# Patient Record
Sex: MALE | URBAN - NONMETROPOLITAN AREA
[De-identification: names, ages, dates, MRNs, and addresses within clinical notes are randomized per-mention and may not be internally consistent; named-entity substitution may affect disease eponyms.]

---

## 2024-06-17 ENCOUNTER — TELEPHONE (OUTPATIENT)
Dept: CARDIOLOGY | Facility: CLINIC | Age: 74
End: 2024-06-17
Payer: MEDICARE

## 2024-06-17 NOTE — TELEPHONE ENCOUNTER
Patient being referred to [facility] from [facility] for heart failure. Left message for patient to call office to schedule appointment.

## 2024-07-10 ENCOUNTER — APPOINTMENT (OUTPATIENT)
Dept: CARDIOLOGY | Facility: CLINIC | Age: 74
End: 2024-07-10
Payer: MEDICARE

## 2024-07-10 VITALS
HEART RATE: 56 BPM | WEIGHT: 124.8 LBS | HEIGHT: 69 IN | BODY MASS INDEX: 18.48 KG/M2 | DIASTOLIC BLOOD PRESSURE: 70 MMHG | SYSTOLIC BLOOD PRESSURE: 98 MMHG

## 2024-07-10 DIAGNOSIS — Z87.891 FORMER SMOKER: ICD-10-CM

## 2024-07-10 DIAGNOSIS — I25.2 HISTORY OF MI (MYOCARDIAL INFARCTION): ICD-10-CM

## 2024-07-10 DIAGNOSIS — I48.91 ATRIAL FIBRILLATION, UNSPECIFIED TYPE (MULTI): ICD-10-CM

## 2024-07-10 DIAGNOSIS — Z98.61 CORONARY ARTERIOSCLEROSIS AFTER PERCUTANEOUS TRANSLUMINAL CORONARY ANGIOPLASTY (PTCA): ICD-10-CM

## 2024-07-10 DIAGNOSIS — I25.10 CORONARY ARTERIOSCLEROSIS AFTER PERCUTANEOUS TRANSLUMINAL CORONARY ANGIOPLASTY (PTCA): ICD-10-CM

## 2024-07-10 DIAGNOSIS — R01.1 MURMUR, HEART: ICD-10-CM

## 2024-07-10 DIAGNOSIS — Z79.899 HIGH RISK MEDICATION USE: ICD-10-CM
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
## 2024-07-10 DIAGNOSIS — I25.10 ASHD (ARTERIOSCLEROTIC HEART DISEASE): ICD-10-CM

## 2024-07-10 DIAGNOSIS — I50.41 ACUTE COMBINED SYSTOLIC AND DIASTOLIC HEART FAILURE (MULTI): ICD-10-CM
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
## 2024-07-10 PROBLEM — E78.5 HYPERLIPIDEMIA: Status: ACTIVE | Noted: 2024-07-10

## 2024-07-10 PROCEDURE — 99205 OFFICE O/P NEW HI 60 MIN: CPT | Performed by: INTERNAL MEDICINE

## 2024-07-10 PROCEDURE — 1159F MED LIST DOCD IN RCRD: CPT | Performed by: INTERNAL MEDICINE

## 2024-07-10 PROCEDURE — 93000 ELECTROCARDIOGRAM COMPLETE: CPT | Performed by: INTERNAL MEDICINE

## 2024-07-10 PROCEDURE — 1036F TOBACCO NON-USER: CPT | Performed by: INTERNAL MEDICINE

## 2024-07-10 RX ORDER — FUROSEMIDE 40 MG/1
0.5 TABLET ORAL DAILY
COMMUNITY

## 2024-07-10 RX ORDER — SACUBITRIL AND VALSARTAN 24; 26 MG/1; MG/1
0.5 TABLET, FILM COATED ORAL 2 TIMES DAILY
COMMUNITY

## 2024-07-10 RX ORDER — LAMOTRIGINE 200 MG/1
200 TABLET ORAL EVERY MORNING
COMMUNITY

## 2024-07-10 RX ORDER — ARIPIPRAZOLE 5 MG/1
1.5 TABLET ORAL NIGHTLY
COMMUNITY

## 2024-07-10 RX ORDER — AMIODARONE HYDROCHLORIDE 200 MG/1
200 TABLET ORAL DAILY
COMMUNITY

## 2024-07-10 RX ORDER — ASPIRIN 81 MG/1
81 TABLET ORAL DAILY
COMMUNITY

## 2024-07-10 RX ORDER — LAMOTRIGINE 150 MG/1
150 TABLET ORAL NIGHTLY
COMMUNITY

## 2024-07-10 RX ORDER — ATORVASTATIN CALCIUM 80 MG/1
80 TABLET, FILM COATED ORAL DAILY
COMMUNITY

## 2024-07-10 ASSESSMENT — ENCOUNTER SYMPTOMS
LIGHT-HEADEDNESS: 1
DIZZINESS: 1

## 2024-07-10 NOTE — LETTER
July 10, 2024     Lewis Owens DO  [address]  [facility] [address]    Patient: Parish Valerio   YOB: 1950   Date of Visit: 7/10/2024       Dear Dr. Lewis Owens DO:    Thank you for referring Parish Valerio to me for evaluation. Below are my notes for this consultation.  If you have questions, please do not hesitate to call me. I look forward to following your patient along with you.       Sincerely,     Don Kuhn DO      CC: No Recipients  ______________________________________________________________________________________    Cardiology Consultation- New Consult    Reason for referral: CHF    HPI: Parish Valerio is a 74 y.o. male seen in cardiology consultation at the request of himself and having moved from [facility] to live with his significant other recently.  He was hospitalized after a fall at [facility]; and Entresto had been held, there is no cardiology consultation in May of this year.    In January of this year he had a severe heart failure event with new onset atrial fibrillation, was treated in [place] at [facility] underwent left and right heart catheterization, and cardiac MRI, and subsequent preparation for single-vessel bypass surgery and I believe possible mitral valve replacement (details of which are vague).  In any event, upon review of his epic chart, he did have left and right heart catheterization revealing chronic total occlusion of the proximal LAD, reconstituted by septal and diagonal branch collaterals distally, with a patent proximal diagonal branch stent with moderate 50% in-stent restenosis and mild, diffuse left circumflex and RCA disease only.  LVEDP was 9 mmHg, PA pressures were 40/19 mmHg, cardiac output/cardiac index measured at 2.52/1.47 L/min/m².  He underwent direct-current cardioversion on January 16 back to sinus rhythm on amiodarone and Eliquis therapies.    He did have a cardiac MRI  however there is no available report from January 15 in regards to the cardiac function there is no echo report available.  Pulmonary function tests were also reviewed revealing minimal restrictive physiology.    He has since been treated with amiodarone, apixaban, aspirin, atorvastatin, furosemide, Entresto.  His metoprolol has been discontinued by his treating cardiologist due to low blood pressures    Today's ECG reveals sinus bradycardia rate of 56, QT corrected interval 480 ms with evidence of high lateral Q wave infarction but no acute ischemic changes    Patient has a remote history of polio with chronic dropfoot, remote history of MI approximately 23 years ago with PCI involving the diagonal branch supposedly, bipolar disorder, and recently more frequent falls x 4 in the past 7 months    He is now seeking out continued cardiovascular evaluation, management and referral to CT surgery for the above LAD occlusion and possible mitral valve repair/replacement.    At this juncture he is quite frail, he has put on approximately 5 pounds in the last 4 weeks and is up to 114 pounds, he does have active bleeding from his right jaw/cheek bone from shaving recently.  He does have evidence of temporal wasting and what appears to be mild protein calorie malnutrition at least by observation    He does not complain of any angina, nor edema.  We do not have an idea about his left ventricular systolic function other than his cardiac index is low on right heart catheterization at 1.47 L/min/m² from this past January prior to institution of medications.    Recommendations: Obtain chemistry, BMP, echocardiography to further assess LV function on therapy and mitral valve status, recommend boost or Ensure supplementation, and will follow-up in 3 months; will also try to obtain the actual cardiac cath CD as well as a cardiac MRI reports and CD.      Past Medical History:   He has no past medical history on file.    Surgical  "History:   He has a past surgical history that includes Testicle surgery; Coronary angioplasty with stent; and Dental surgery.    Family History:   Family History   Problem Relation Name Age of Onset   • Stroke Mother     • Heart attack Father         Social History:   Social History     Tobacco Use   • Smoking status: Former     Current packs/day: 0.00     Types: Cigarettes     Quit date: 2024     Years since quittin.5   • Smokeless tobacco: Never   Substance Use Topics   • Alcohol use: Yes     Comment: socially        Allergies:  Farxiga [dapagliflozin]     Current Medications:    Current Outpatient Medications:   •  amiodarone (Pacerone) 200 mg tablet, Take 1 tablet (200 mg) by mouth once daily., Disp: , Rfl:   •  apixaban (Eliquis) 5 mg tablet, Take 1 tablet (5 mg) by mouth 2 times a day., Disp: , Rfl:   •  ARIPiprazole (Abilify) 5 mg tablet, Take 1.5 tablets (7.5 mg) by mouth once daily at bedtime., Disp: , Rfl:   •  aspirin 81 mg EC tablet, Take 1 tablet (81 mg) by mouth once daily., Disp: , Rfl:   •  atorvastatin (Lipitor) 80 mg tablet, Take 1 tablet (80 mg) by mouth once daily., Disp: , Rfl:   •  furosemide (Lasix) 40 mg tablet, Take 0.5 tablets (20 mg) by mouth once daily., Disp: , Rfl:   •  lamoTRIgine (LaMICtal) 150 mg tablet, Take 1 tablet (150 mg) by mouth once daily at bedtime., Disp: , Rfl:   •  lamoTRIgine (LaMICtal) 200 mg tablet, Take 1 tablet (200 mg) by mouth once daily in the morning., Disp: , Rfl:   •  sacubitriL-valsartan (Entresto) 24-26 mg tablet, Take 0.5 tablets by mouth 2 times a day., Disp: , Rfl:      Vitals:  Vitals:    07/10/24 1102 07/10/24 1106   BP: 102/72 98/70   BP Location: Left arm Right arm   Patient Position: Sitting Sitting   Pulse: 56    Weight: 56.6 kg (124 lb 12.8 oz)    Height: 1.753 m (5' 9\")       EKG done in office today     Review of Systems   Neurological:  Positive for dizziness and light-headedness.   All other systems reviewed and are " negative.      Objective        Physical Exam  Constitutional:       Appearance: Normal appearance.   HENT:      Nose: Nose normal.   Neck:      Vascular: No carotid bruit.   Cardiovascular:      Rate and Rhythm: Normal rate.      Pulses: Normal pulses.      Heart sounds: Murmur heard.      Comments: 1/6 systolic murmur  Pulmonary:      Effort: Pulmonary effort is normal.   Abdominal:      General: Bowel sounds are normal.      Palpations: Abdomen is soft.   Musculoskeletal:         General: Normal range of motion.      Cervical back: Normal range of motion.      Right lower leg: No edema.      Left lower leg: No edema.   Skin:     General: Skin is warm and dry.   Neurological:      General: No focal deficit present.      Mental Status: He is alert.   Psychiatric:         Mood and Affect: Mood normal.         Behavior: Behavior normal.         Thought Content: Thought content normal.         Judgment: Judgment normal.                Assessment and Plan:   1. ASHD (arteriosclerotic heart disease)        2. History of MI (myocardial infarction)        3. Coronary arteriosclerosis after percutaneous transluminal coronary angioplasty (PTCA)        4. Acute combined systolic and diastolic heart failure (Multi)        5. Atrial fibrillation, unspecified type (Multi)        6. High risk medication use        7. Fall, sequela        8. BMI less than 19,adult        9. Former smoker        10. Murmur, heart               Scribe Attestation  By signing my name below, IAurelia LPN, Scribe   attest that this documentation has been prepared under the direction and in the presence of Don Kuhn DO.    Provider Attestation - Scribe documentation    All medical record entries made by the Scribe were at my direction and personally dictated by me. I have reviewed the chart and agree that the record accurately reflects my personal performance of the history, physical exam, discussion and plan.

## 2024-07-10 NOTE — PROGRESS NOTES
Cardiology Consultation- New Consult    Reason for referral: CHF    HPI: Parish Valerio is a 74 y.o. male seen in cardiology consultation at the request of himself and having moved from [facility] to [place] to live with his significant other recently.  He was hospitalized after a fall at [facility]; and Entresto had been held, there is no cardiology consultation in May of this year.    In January of this year he had a severe heart failure event with new onset atrial fibrillation, was treated in [place] at [facility] underwent left and right heart catheterization, and cardiac MRI, and subsequent preparation for single-vessel bypass surgery and I believe possible mitral valve replacement (details of which are vague).  In any event, upon review of his epic chart, he did have left and right heart catheterization revealing chronic total occlusion of the proximal LAD, reconstituted by septal and diagonal branch collaterals distally, with a patent proximal diagonal branch stent with moderate 50% in-stent restenosis and mild, diffuse left circumflex and RCA disease only.  LVEDP was 9 mmHg, PA pressures were 40/19 mmHg, cardiac output/cardiac index measured at 2.52/1.47 L/min/m².  He underwent direct-current cardioversion on January 16 back to sinus rhythm on amiodarone and Eliquis therapies.    He did have a cardiac MRI however there is no available report from January 15 in regards to the cardiac function there is no echo report available.  Pulmonary function tests were also reviewed revealing minimal restrictive physiology.    He has since been treated with amiodarone, apixaban, aspirin, atorvastatin, furosemide, Entresto.  His metoprolol has been discontinued by his treating cardiologist due to low blood pressures    Today's ECG reveals sinus bradycardia rate of 56, QT corrected interval 480 ms with evidence of high lateral Q wave infarction but no acute ischemic changes    Patient has a  remote history of polio with chronic dropfoot, remote history of MI approximately 23 years ago with PCI involving the diagonal branch supposedly, bipolar disorder, and recently more frequent falls x 4 in the past 7 months    He is now seeking out continued cardiovascular evaluation, management and referral to CT surgery for the above LAD occlusion and possible mitral valve repair/replacement.    At this juncture he is quite frail, he has put on approximately 5 pounds in the last 4 weeks and is up to 114 pounds, he does have active bleeding from his right jaw/cheek bone from shaving recently.  He does have evidence of temporal wasting and what appears to be mild protein calorie malnutrition at least by observation    He does not complain of any angina, nor edema.  We do not have an idea about his left ventricular systolic function other than his cardiac index is low on right heart catheterization at 1.47 L/min/m² from this past January prior to institution of medications.    Recommendations: Obtain chemistry, BMP, echocardiography to further assess LV function on therapy and mitral valve status, recommend boost or Ensure supplementation, and will follow-up in 3 months; will also try to obtain the actual cardiac cath CD as well as a cardiac MRI reports and CD.      Past Medical History:   He has no past medical history on file.    Surgical History:   He has a past surgical history that includes Testicle surgery; Coronary angioplasty with stent; and Dental surgery.    Family History:   Family History   Problem Relation Name Age of Onset    Stroke Mother      Heart attack Father         Social History:   Social History     Tobacco Use    Smoking status: Former     Current packs/day: 0.00     Types: Cigarettes     Quit date: 2024     Years since quittin.5    Smokeless tobacco: Never   Substance Use Topics    Alcohol use: Yes     Comment: socially        Allergies:  Farxiga [dapagliflozin]     Current  "Medications:    Current Outpatient Medications:     amiodarone (Pacerone) 200 mg tablet, Take 1 tablet (200 mg) by mouth once daily., Disp: , Rfl:     apixaban (Eliquis) 5 mg tablet, Take 1 tablet (5 mg) by mouth 2 times a day., Disp: , Rfl:     ARIPiprazole (Abilify) 5 mg tablet, Take 1.5 tablets (7.5 mg) by mouth once daily at bedtime., Disp: , Rfl:     aspirin 81 mg EC tablet, Take 1 tablet (81 mg) by mouth once daily., Disp: , Rfl:     atorvastatin (Lipitor) 80 mg tablet, Take 1 tablet (80 mg) by mouth once daily., Disp: , Rfl:     furosemide (Lasix) 40 mg tablet, Take 0.5 tablets (20 mg) by mouth once daily., Disp: , Rfl:     lamoTRIgine (LaMICtal) 150 mg tablet, Take 1 tablet (150 mg) by mouth once daily at bedtime., Disp: , Rfl:     lamoTRIgine (LaMICtal) 200 mg tablet, Take 1 tablet (200 mg) by mouth once daily in the morning., Disp: , Rfl:     sacubitriL-valsartan (Entresto) 24-26 mg tablet, Take 0.5 tablets by mouth 2 times a day., Disp: , Rfl:      Vitals:  Vitals:    07/10/24 1102 07/10/24 1106   BP: 102/72 98/70   BP Location: Left arm Right arm   Patient Position: Sitting Sitting   Pulse: 56    Weight: 56.6 kg (124 lb 12.8 oz)    Height: 1.753 m (5' 9\")       EKG done in office today     Review of Systems   Neurological:  Positive for dizziness and light-headedness.   All other systems reviewed and are negative.      Objective         Physical Exam  Constitutional:       Appearance: Normal appearance.   HENT:      Nose: Nose normal.   Neck:      Vascular: No carotid bruit.   Cardiovascular:      Rate and Rhythm: Normal rate.      Pulses: Normal pulses.      Heart sounds: Murmur heard.      Comments: 1/6 systolic murmur  Pulmonary:      Effort: Pulmonary effort is normal.   Abdominal:      General: Bowel sounds are normal.      Palpations: Abdomen is soft.   Musculoskeletal:         General: Normal range of motion.      Cervical back: Normal range of motion.      Right lower leg: No edema.      Left " lower leg: No edema.   Skin:     General: Skin is warm and dry.   Neurological:      General: No focal deficit present.      Mental Status: He is alert.   Psychiatric:         Mood and Affect: Mood normal.         Behavior: Behavior normal.         Thought Content: Thought content normal.         Judgment: Judgment normal.                Assessment and Plan:   1. ASHD (arteriosclerotic heart disease)        2. History of MI (myocardial infarction)        3. Coronary arteriosclerosis after percutaneous transluminal coronary angioplasty (PTCA)        4. Acute combined systolic and diastolic heart failure (Multi)        5. Atrial fibrillation, unspecified type (Multi)        6. High risk medication use        7. Fall, sequela        8. BMI less than 19,adult        9. Former smoker        10. Murmur, heart               Scribe Attestation  By signing my name below, I, Aurelia ORTIZ LPN  , Scribe   attest that this documentation has been prepared under the direction and in the presence of Don Kuhn DO.    Provider Attestation - Scribe documentation    All medical record entries made by the Scribe were at my direction and personally dictated by me. I have reviewed the chart and agree that the record accurately reflects my personal performance of the history, physical exam, discussion and plan.

## 2024-07-10 NOTE — PATIENT INSTRUCTIONS
Please bring all medicines, vitamins, and herbal supplements with you when you come to the office.    Prescriptions will not be filled unless you are compliant with your follow up appointments or have a follow up appointment scheduled as per instruction of your physician. Refills should be requested at the time of your visit.     Fall Prevention Education Given   BMI was below normal measurement. Current weight: 56.6 kg (124 lb 12.8 oz)  Weight change since last visit (-) denotes wt loss 124.8 lbs   Weight gain needed to achieve  BMI of 18.5 = 0.2 Lbs    Instructed patient to consume high calorie, high protein diet.

## 2024-07-16 ENCOUNTER — APPOINTMENT (OUTPATIENT)
Dept: CARDIOLOGY | Facility: CLINIC | Age: 74
End: 2024-07-16
Payer: MEDICARE

## 2024-07-17 ENCOUNTER — APPOINTMENT (OUTPATIENT)
Dept: CARDIOLOGY | Facility: CLINIC | Age: 74
End: 2024-07-17
Payer: MEDICARE

## 2024-07-23 ENCOUNTER — APPOINTMENT (OUTPATIENT)
Dept: CARDIOLOGY | Facility: CLINIC | Age: 74
End: 2024-07-23
Payer: MEDICARE

## 2024-07-23 DIAGNOSIS — I48.91 ATRIAL FIBRILLATION, UNSPECIFIED TYPE (MULTI): ICD-10-CM

## 2024-07-23 PROCEDURE — 93272 ECG/REVIEW INTERPRET ONLY: CPT | Performed by: INTERNAL MEDICINE

## 2024-07-24 ENCOUNTER — TELEPHONE (OUTPATIENT)
Dept: CARDIOLOGY | Facility: CLINIC | Age: 74
End: 2024-07-24
Payer: MEDICARE

## 2024-07-24 DIAGNOSIS — E78.5 HYPERLIPIDEMIA, UNSPECIFIED HYPERLIPIDEMIA TYPE: ICD-10-CM

## 2024-07-24 DIAGNOSIS — I25.2 HISTORY OF MI (MYOCARDIAL INFARCTION): ICD-10-CM

## 2024-07-24 DIAGNOSIS — I25.10 ASHD (ARTERIOSCLEROTIC HEART DISEASE): ICD-10-CM

## 2024-07-24 DIAGNOSIS — I25.10 CORONARY ARTERIOSCLEROSIS AFTER PERCUTANEOUS TRANSLUMINAL CORONARY ANGIOPLASTY (PTCA): ICD-10-CM

## 2024-07-24 DIAGNOSIS — Z98.61 CORONARY ARTERIOSCLEROSIS AFTER PERCUTANEOUS TRANSLUMINAL CORONARY ANGIOPLASTY (PTCA): ICD-10-CM

## 2024-07-24 NOTE — TELEPHONE ENCOUNTER
"Wife presents to office very concerned about patient's status. She states he has been in bed since 7/14/24 where he only gets up for about an hour a day. She says he is going down hill very quickly. He reports occasional episodes of lightheadedness, dizziness, and just recently complained of chest pain today which subsided. He is taking all meds as prescribed. Wife is very concerned as she feels, \"he is wasting away in front of me.\" Instructed her to take patient to ER. She is declining at this time as she states patient is refusing to go. Please advise  "

## 2024-07-25 LAB
NON-UH HIE ANION GAP: 9.4 (ref 6–15)
NON-UH HIE B-TYPE NATRIURETIC PEPTIDE: 58 PG/ML (ref 5–100)
NON-UH HIE BLOOD UREA NITROGEN: 26 MG/DL (ref 7–25)
NON-UH HIE CALCIUM: 9.6 MG/DL (ref 8.6–10.3)
NON-UH HIE CARBON DIOXIDE: 30.7 MMOL/L (ref 21–31)
NON-UH HIE CHLORIDE: 102 MMOL/L (ref 98–107)
NON-UH HIE CREATININE: 0.87 MG/DL (ref 0.7–1.3)
NON-UH HIE ESTIMATED GFR: > 60
NON-UH HIE GLUCOSE: 114 MG/DL (ref 70–100)
NON-UH HIE POTASSIUM: 4.1 MMOL/L (ref 3.5–5.1)
NON-UH HIE SODIUM: 138 MMOL/L (ref 136–145)

## 2024-07-26 ENCOUNTER — TELEPHONE (OUTPATIENT)
Dept: CARDIOLOGY | Facility: CLINIC | Age: 74
End: 2024-07-26
Payer: MEDICARE

## 2024-07-26 NOTE — TELEPHONE ENCOUNTER
----- Message from Don Kuhn sent at 7/25/2024 12:40 PM EDT -----  Regarding: Referral  Patient originally referred to me from [facility] in [place] following cardiac workup for heart failure including left and right heart catheterizations and MRI of the heart    He has chronic occlusion of the mid LAD with persistent transmural infarction in the mid anterior segment likely secondary to diagonal branch and viability of the mid to distal LAD distribution and ejection fraction of 32%.    He should be referred to cardiothoracic surgery and heart failure team at [facility] for consideration of single-vessel revascularization and possible mitral valve repair versus continued medical therapy for ischemic cardiomyopathy.  I would recommend Dr. Gera Garcia for cardiac surgery and Dr. Thornton for heart failure team.  ----- Message -----  From: Christy Swartz  Sent: 7/11/2024   3:32 PM EDT  To: Don Kuhn, DO

## 2024-08-02 NOTE — TELEPHONE ENCOUNTER
Wife phones back, updated her on referral for CT surgical consultation, she verbalized understanding and is agreeable.     Awaiting to receive results from [facility].    Order prepped and waiting signature from Dr. Don Kuhn, DO    Task sent to  to call and arrange once order signed.

## 2024-08-09 ENCOUNTER — OFFICE VISIT (OUTPATIENT)
Dept: CARDIOLOGY | Facility: HOSPITAL | Age: 74
End: 2024-08-09
Payer: MEDICARE

## 2024-08-09 ENCOUNTER — HOSPITAL ENCOUNTER (OUTPATIENT)
Dept: CARDIOLOGY | Facility: HOSPITAL | Age: 74
Discharge: HOME | End: 2024-08-09
Payer: MEDICARE

## 2024-08-09 VITALS
WEIGHT: 124 LBS | HEIGHT: 69 IN | SYSTOLIC BLOOD PRESSURE: 84 MMHG | OXYGEN SATURATION: 97 % | BODY MASS INDEX: 18.37 KG/M2 | DIASTOLIC BLOOD PRESSURE: 48 MMHG | HEART RATE: 62 BPM

## 2024-08-09 DIAGNOSIS — I50.20 ACC/AHA STAGE C SYSTOLIC HEART FAILURE (MULTI): Primary | ICD-10-CM

## 2024-08-09 DIAGNOSIS — I48.0 PAROXYSMAL ATRIAL FIBRILLATION (MULTI): ICD-10-CM

## 2024-08-09 DIAGNOSIS — I25.10 ASCVD (ARTERIOSCLEROTIC CARDIOVASCULAR DISEASE): ICD-10-CM

## 2024-08-09 DIAGNOSIS — I25.5 ISCHEMIC CARDIOMYOPATHY: ICD-10-CM

## 2024-08-09 LAB
BODY SURFACE AREA: 1.65 M2
BODY SURFACE AREA: 1.65 M2

## 2024-08-09 PROCEDURE — 3008F BODY MASS INDEX DOCD: CPT | Performed by: INTERNAL MEDICINE

## 2024-08-09 PROCEDURE — 99215 OFFICE O/P EST HI 40 MIN: CPT | Performed by: INTERNAL MEDICINE

## 2024-08-09 PROCEDURE — 1159F MED LIST DOCD IN RCRD: CPT | Performed by: INTERNAL MEDICINE

## 2024-08-09 PROCEDURE — 1036F TOBACCO NON-USER: CPT | Performed by: INTERNAL MEDICINE

## 2024-08-09 PROCEDURE — 99205 OFFICE O/P NEW HI 60 MIN: CPT | Performed by: INTERNAL MEDICINE

## 2024-08-09 PROCEDURE — 1126F AMNT PAIN NOTED NONE PRSNT: CPT | Performed by: INTERNAL MEDICINE

## 2024-08-09 ASSESSMENT — PAIN SCALES - GENERAL: PAINLEVEL: 0-NO PAIN

## 2024-08-09 NOTE — PATIENT INSTRUCTIONS
It was a pleasure seeing you today. Please contact myself or my team with any questions.     To reach Dr. Thornton' office please call [phone] (Pam).   Fax: [phone]   To schedule an appointment call [phone]     If you have any questions or need cardiac medication refills, please call the Heart Failure office at [phone], option 6. You may also contact the  Heart Failure Nursing team via email at [email or web address] (Please include your name and date of birth).        1) Continue your current medications  2) Please work to find any supplements that you can take to gain weight; you need to demonstrate a trend upwards  3) Follow up in 6 weeks at /[facility]

## 2024-08-09 NOTE — LETTER
"August 9, 2024     Don Kuhn DO  [address]  [facility] [address]    Patient: Parish Valerio   YOB: 1950   Date of Visit: 8/9/2024       Dear Dr. Don Kuhn DO:    Thank you for referring Parish Valerio to me for evaluation. Below are my notes for this consultation.  If you have questions, please do not hesitate to call me. I look forward to following your patient along with you.       Sincerely,     Froilan Thornton DO      CC: No Recipients  ______________________________________________________________________________________        [facility]  Primary Care Physician: Lewis Owens DO  Referring Provider/Cardiologist: Bam     Date of Visit: 08/09/2024 11:00 AM EDT  Location of visit: [facility]     HPI:   Mr. Valerio is a 74M with a PMHx sig for CAD s/p PCI (diag; 2001), stage C systolic HF/ICM/HFrEF with moderate LV dysfunction currently without an ICD, and pAF on AAD and DOAC therapies who was referred to the Advanced Heart Failure clinic for consultation.     Initially diagnosed with ICM/HFrEF when he presented to [facility] ([facility]) in early January with progressive dyspnea and chest discomfort. At that time he was noted to have LV dysfunction. A cath noted pLAD occlusion. A cMRI was performed noting mostly of the anterior had viability. After consultation with CTS his CABG was deferred in light of weight loss/sarcopenia. He was told to \"bulk up\" and then he would be reconsidered.     Since that time he has relocated to [place] to live with his GF. When he presents today he reports that he is still struggling to gain much weight (has gained ~4 lbs over the last 7 months). They just recently picked up protein shakes and are planning on giving them a try.     At the current time he denies chest pain, PND, orthopnea, LE edema, palpitations, light headedness, dizziness, or syncope. He " "does experience exertional dyspnea with activities (taking out the trash etc). He had previously attempted physical therapy but due to ongoing issues related to balance further therapy was deferred. He had not participated in cardiac rehab for this reason as well.         PMHx:  Remote history of polio (age 18 months), CAD s/p PCI (diag), stage C systolic HF/ICM/HFrEF with moderate LV dysfunction currently without an ICD, pAF on AAD and DOAC therapies    SocHx:  Lives in [place] with GF  Quit smoking (1/2024); 0.5-0.75 ppd for his adult life  Occ etOH, denies illicits    FamHx:  Father with CAD/MI  Mother with CVA        Current Outpatient Medications   Medication Sig Dispense Refill   • amiodarone (Pacerone) 200 mg tablet Take 1 tablet (200 mg) by mouth once daily.     • apixaban (Eliquis) 5 mg tablet Take 1 tablet (5 mg) by mouth 2 times a day.     • ARIPiprazole (Abilify) 5 mg tablet Take 1.5 tablets (7.5 mg) by mouth once daily at bedtime.     • aspirin 81 mg EC tablet Take 1 tablet (81 mg) by mouth once daily.     • atorvastatin (Lipitor) 80 mg tablet Take 1 tablet (80 mg) by mouth once daily.     • furosemide (Lasix) 40 mg tablet Take 0.5 tablets (20 mg) by mouth once daily.     • lamoTRIgine (LaMICtal) 150 mg tablet Take 1 tablet (150 mg) by mouth once daily at bedtime.     • lamoTRIgine (LaMICtal) 200 mg tablet Take 1 tablet (200 mg) by mouth once daily in the morning.     • sacubitriL-valsartan (Entresto) 24-26 mg tablet Take 0.5 tablets by mouth 2 times a day.       No current facility-administered medications for this visit.       Allergies   Allergen Reactions   • Farxiga [Dapagliflozin] Nausea/vomiting         Visit Vitals  BP (!) 84/48 (BP Location: Left arm, Patient Position: Sitting)   Pulse 62   Ht 1.753 m (5' 9\")   Wt 56.2 kg (124 lb)   SpO2 97%   BMI 18.31 kg/m²   Smoking Status Former   BSA 1.65 m²        Physical Exam:  On exam Mr. Valerio appears sarcopenic, is alert and oriented x3, and in " no acute distress. His sclera are anicteric and his oropharynx has moist mucous membranes. His neck is supple and without thyromegaly. The JVP is ~5 cm of water above the right atrium. There is a faint bruit on the left. His cardiac exam has regular rhythm, normal S1, S2. No S3/4. There are no murmurs. His lungs are clear to auscultation bilaterally and there is no dullness to percussion. His abdomen is soft, nontender with normoactive bowel sounds. There is no HJR. The extremities are warm and without edema. The skin is dry. There is no rash present. The distal pulses are 2+ in all four extremities. His mood and affect are appropriate for todays encounter.       Cardiac Labs/Diagnostics:  Holter (7/23/24):  1-the rhythm was sinus throughout the recording with an average heart rate of 58 bpm, the minimum heart rate was 47 bpm sinus bradycardia at 12:47 AM and the maximal heart rate was 103 bpm sinus tachycardia at 9:16 AM  2-occasional isolated premature ventricular complexes, there was a total number of 313 beats in 24 hours representing 0.4% of total QRS complexes with 1 event of ventricular couplet  3-rare isolated premature atrial complexes, there was 10 beats in 24 hours  4-no pauses exceeding 2 seconds were seen  5- an episode of chest pain reported by the patient correlated with normal sinus rhythm and normal heart rate    cMRI (1/15/24):  1. Mildly dilated left ventricle with moderately reduced systolic function. LVEF = 32%. There is severe   hypokinesis of the mid to basal anterior wall with mild to moderate global hypokinesis. Delayed Gadolinium   enhancement imaging demonstrates non-transmural scar (26-50% wall thickness) in the mid anterior wall .   There is transmural scar (50-75% wall thickness) in the mid anterolateral wall that is non-viable. All   remaining myocardial segments are viable. Overall, findings suggest that majority of the LAD territory is   viable. The segment of the mid anterolateral  wall that is non-viable is likely diagonal branch territory.   2. Normal right ventricular size and systolic function. RVEF = 56%.   3. Moderate mitral regurgitation (regurgitant volume 33 ml, regurgitant fraction 46%).   4. Mild to moderate aortic regurgitation.   5. Mildly dilated aortic root (4.1 cm) and ascending aorta (4.2 cm).   6. Extra-cardiac: Non-cardiac findings visualized on this study will be independently reviewed by a   radiologist. Please see the separate Radiologist report.     Cardiac cath (1/12/24):  LM: Mild disease.   LAD: The LAD is occluded in the proximal segment following a trifurcation with a large septal and large diagonal branch.  The diagonal branch has a patent proximal vessel previously placed stent with up to 50% ISR.  The large septal at this trifurcation also has moderate diffuse disease.  Both the diagonal and septal feed collateralization to the distal LAD.   LCX: Mild disease and up to 40% mid vessel lesion.   RCA: Moderate-sized vessel, mild diffuse disease.  Dominant.   LVEDP: 9 mmHg   Access: Right radial artery, TR compression band.     Carotid ultrasounds (1/12/24):  Right.   * Less than 50% stenosis in the right internal carotid artery.   * Right vertebral artery is patent with antegrade flow.   * No evidence of hemodynamically significant stenosis in the right subclavian, external carotid and common carotid arteries.   * The right vertebral artery flow is dampened.   Left.   * Less than 50% stenosis in the left internal carotid artery.   * Left vertebral artery is patent with antegrade flow.   * No evidence of hemodynamically significant stenosis in the left common carotid, external carotid and subclavian arteries.     Right Heart Catheterization   RA:8 mean mmHg   RV:36/2 mmHg   PA: 40/19 (27) mmHg   PCWP: 17/20 (20) mmHg     Thermal CO/CI: 2.52 L/m / 1.47 L/min/m2   Meryl CO/CI: 3.46 L/m / 2.02 L/min/m2       Impression/Plan:  Mr. Valerio is a 74M with a PMHx sig for CAD  s/p PCI (diag; 2001), stage C systolic HF/ICM/HFrEF with moderate LV dysfunction currently without an ICD, and pAF on AAD and DOAC therapies who was referred to the Advanced Heart Failure clinic for consultation. At the current time he has functional class II symptoms and is euvolemic on exam. He does appear frail and sarcopenic with protein calorie malnutrition.     1) Stage C chronic systolic HF/ICM/HFrEF with moderate LV dysfunction (LVEF 32%; 1/2024) currently without an ICD  Currently on maximally tolerated GDMT (limited by BP). RHC in January with reduced CO/CI with minimally elevated left sided filling pressure. Of note, not on a GDM BB given his reduced CO/CI. No signs of volume overload.   -c/w entresto 12/13 mg bid, furosemide 20 mg daily    2) CAD s/p PCI (diag; 2001)  Cath in 1/2024 with pLAD occlusion. cMRI with viability in most the anterior wall. CABG deferred at that time in light of ongoing sarcopenia and protein calorie malnutrition. Since then he has only gained ~4 lbs. We discussed way to improve his nutritional status to allow him to become a possible surgical candidate. In addition, we discussed that if he has isolated LAD disease, he could be considered for MIDCAB (vs sternotomy). While in clinic, I briefly had Dr. Holman come and see him who was in agreement.   -c/w ASA, statin  -will obtain cath and cMRI images to review  -will focus on increasing his nutritional intake (discussed ensure/boost and magic cups)  -will have him return in 6 weeks; pending the degree of sarcopenia that persists will discuss with either Daniela Carrasco or Suri Gee re: MIDCAB    3) pAF on AAD and DOAC therapies  Previously required cardioversion. Denies bleeding.   -c/w amiodarone 200 mg daily, eliquis 5 mg bid        F/U: 6 weeks at /[facility]    [place]on,  Thank you for referring Mr. Valerio to the  Advanced Heart Failure Clinic. Please let me know if you have any questions.        ____________________________________________________________  Froilan Thornton DO  Section of Advanced Heart Failure and Cardiac Transplantation  Division of Cardiovascular Medicine  [facility] [place]  [facility]

## 2024-08-09 NOTE — PROGRESS NOTES
"    [facility]  Primary Care Physician: Lewis Owens DO  Referring Provider/Cardiologist: Bam     Date of Visit: 08/09/2024 11:00 AM EDT  Location of visit: [facility]     HPI:   Mr. Valerio is a 74M with a PMHx sig for CAD s/p PCI (diag; 2001), stage C systolic HF/ICM/HFrEF with moderate LV dysfunction currently without an ICD, and pAF on AAD and DOAC therapies who was referred to the Advanced Heart Failure clinic for consultation.     Initially diagnosed with ICM/HFrEF when he presented to [facility] ([facility]) in early January with progressive dyspnea and chest discomfort. At that time he was noted to have LV dysfunction. A cath noted pLAD occlusion. A cMRI was performed noting mostly of the anterior had viability. After consultation with CTS his CABG was deferred in light of weight loss/sarcopenia. He was told to \"bulk up\" and then he would be reconsidered.     Since that time he has relocated to [place] to live with his GF. When he presents today he reports that he is still struggling to gain much weight (has gained ~4 lbs over the last 7 months). They just recently picked up protein shakes and are planning on giving them a try.     At the current time he denies chest pain, PND, orthopnea, LE edema, palpitations, light headedness, dizziness, or syncope. He does experience exertional dyspnea with activities (taking out the trash etc). He had previously attempted physical therapy but due to ongoing issues related to balance further therapy was deferred. He had not participated in cardiac rehab for this reason as well.         PMHx:  Remote history of polio (age 18 months), CAD s/p PCI (diag), stage C systolic HF/ICM/HFrEF with moderate LV dysfunction currently without an ICD, pAF on AAD and DOAC therapies    SocHx:  Lives in [place] with GF  Quit smoking (1/2024); 0.5-0.75 ppd for his adult life  Occ etOH, denies illicits    FamHx:  Father " "with CAD/MI  Mother with CVA        Current Outpatient Medications   Medication Sig Dispense Refill    amiodarone (Pacerone) 200 mg tablet Take 1 tablet (200 mg) by mouth once daily.      apixaban (Eliquis) 5 mg tablet Take 1 tablet (5 mg) by mouth 2 times a day.      ARIPiprazole (Abilify) 5 mg tablet Take 1.5 tablets (7.5 mg) by mouth once daily at bedtime.      aspirin 81 mg EC tablet Take 1 tablet (81 mg) by mouth once daily.      atorvastatin (Lipitor) 80 mg tablet Take 1 tablet (80 mg) by mouth once daily.      furosemide (Lasix) 40 mg tablet Take 0.5 tablets (20 mg) by mouth once daily.      lamoTRIgine (LaMICtal) 150 mg tablet Take 1 tablet (150 mg) by mouth once daily at bedtime.      lamoTRIgine (LaMICtal) 200 mg tablet Take 1 tablet (200 mg) by mouth once daily in the morning.      sacubitriL-valsartan (Entresto) 24-26 mg tablet Take 0.5 tablets by mouth 2 times a day.       No current facility-administered medications for this visit.       Allergies   Allergen Reactions    Farxiga [Dapagliflozin] Nausea/vomiting         Visit Vitals  BP (!) 84/48 (BP Location: Left arm, Patient Position: Sitting)   Pulse 62   Ht 1.753 m (5' 9\")   Wt 56.2 kg (124 lb)   SpO2 97%   BMI 18.31 kg/m²   Smoking Status Former   BSA 1.65 m²        Physical Exam:  On exam Mr. Valerio appears sarcopenic, is alert and oriented x3, and in no acute distress. His sclera are anicteric and his oropharynx has moist mucous membranes. His neck is supple and without thyromegaly. The JVP is ~5 cm of water above the right atrium. There is a faint bruit on the left. His cardiac exam has regular rhythm, normal S1, S2. No S3/4. There are no murmurs. His lungs are clear to auscultation bilaterally and there is no dullness to percussion. His abdomen is soft, nontender with normoactive bowel sounds. There is no HJR. The extremities are warm and without edema. The skin is dry. There is no rash present. The distal pulses are 2+ in all four " extremities. His mood and affect are appropriate for todays encounter.       Cardiac Labs/Diagnostics:  Holter (7/23/24):  1-the rhythm was sinus throughout the recording with an average heart rate of 58 bpm, the minimum heart rate was 47 bpm sinus bradycardia at 12:47 AM and the maximal heart rate was 103 bpm sinus tachycardia at 9:16 AM  2-occasional isolated premature ventricular complexes, there was a total number of 313 beats in 24 hours representing 0.4% of total QRS complexes with 1 event of ventricular couplet  3-rare isolated premature atrial complexes, there was 10 beats in 24 hours  4-no pauses exceeding 2 seconds were seen  5- an episode of chest pain reported by the patient correlated with normal sinus rhythm and normal heart rate    cMRI (1/15/24):  1. Mildly dilated left ventricle with moderately reduced systolic function. LVEF = 32%. There is severe   hypokinesis of the mid to basal anterior wall with mild to moderate global hypokinesis. Delayed Gadolinium   enhancement imaging demonstrates non-transmural scar (26-50% wall thickness) in the mid anterior wall .   There is transmural scar (50-75% wall thickness) in the mid anterolateral wall that is non-viable. All   remaining myocardial segments are viable. Overall, findings suggest that majority of the LAD territory is   viable. The segment of the mid anterolateral wall that is non-viable is likely diagonal branch territory.   2. Normal right ventricular size and systolic function. RVEF = 56%.   3. Moderate mitral regurgitation (regurgitant volume 33 ml, regurgitant fraction 46%).   4. Mild to moderate aortic regurgitation.   5. Mildly dilated aortic root (4.1 cm) and ascending aorta (4.2 cm).   6. Extra-cardiac: Non-cardiac findings visualized on this study will be independently reviewed by a   radiologist. Please see the separate Radiologist report.     Cardiac cath (1/12/24):  LM: Mild disease.   LAD: The LAD is occluded in the proximal segment  following a trifurcation with a large septal and large diagonal branch.  The diagonal branch has a patent proximal vessel previously placed stent with up to 50% ISR.  The large septal at this trifurcation also has moderate diffuse disease.  Both the diagonal and septal feed collateralization to the distal LAD.   LCX: Mild disease and up to 40% mid vessel lesion.   RCA: Moderate-sized vessel, mild diffuse disease.  Dominant.   LVEDP: 9 mmHg   Access: Right radial artery, TR compression band.     Carotid ultrasounds (1/12/24):  Right.   * Less than 50% stenosis in the right internal carotid artery.   * Right vertebral artery is patent with antegrade flow.   * No evidence of hemodynamically significant stenosis in the right subclavian, external carotid and common carotid arteries.   * The right vertebral artery flow is dampened.   Left.   * Less than 50% stenosis in the left internal carotid artery.   * Left vertebral artery is patent with antegrade flow.   * No evidence of hemodynamically significant stenosis in the left common carotid, external carotid and subclavian arteries.     Right Heart Catheterization   RA:8 mean mmHg   RV:36/2 mmHg   PA: 40/19 (27) mmHg   PCWP: 17/20 (20) mmHg     Thermal CO/CI: 2.52 L/m / 1.47 L/min/m2   Meryl CO/CI: 3.46 L/m / 2.02 L/min/m2       Impression/Plan:  Mr. Valerio is a 74M with a PMHx sig for CAD s/p PCI (diag; 2001), stage C systolic HF/ICM/HFrEF with moderate LV dysfunction currently without an ICD, and pAF on AAD and DOAC therapies who was referred to the Advanced Heart Failure clinic for consultation. At the current time he has functional class II symptoms and is euvolemic on exam. He does appear frail and sarcopenic with protein calorie malnutrition.     1) Stage C chronic systolic HF/ICM/HFrEF with moderate LV dysfunction (LVEF 32%; 1/2024) currently without an ICD  Currently on maximally tolerated GDMT (limited by BP). RHC in January with reduced CO/CI with minimally  elevated left sided filling pressure. Of note, not on a GDM BB given his reduced CO/CI. No signs of volume overload.   -c/w entresto 12/13 mg bid, furosemide 20 mg daily    2) CAD s/p PCI (diag; 2001)  Cath in 1/2024 with pLAD occlusion. cMRI with viability in most the anterior wall. CABG deferred at that time in light of ongoing sarcopenia and protein calorie malnutrition. Since then he has only gained ~4 lbs. We discussed way to improve his nutritional status to allow him to become a possible surgical candidate. In addition, we discussed that if he has isolated LAD disease, he could be considered for MIDCAB (vs sternotomy). While in clinic, I briefly had Dr. Holman come and see him who was in agreement.   -c/w ASA, statin  -will obtain cath and cMRI images to review  -will focus on increasing his nutritional intake (discussed ensure/boost and magic cups)  -will have him return in 6 weeks; pending the degree of sarcopenia that persists will discuss with either Daniela Carrasco or Suri Gee re: MIDCAB    3) pAF on AAD and DOAC therapies  Previously required cardioversion. Denies bleeding.   -c/w amiodarone 200 mg daily, eliquis 5 mg bid        F/U: 6 weeks at /[facility]    [place],  Thank you for referring Mr. Valerio to the  Advanced Heart Failure Clinic. Please let me know if you have any questions.       ____________________________________________________________  Froilan Thornton DO  Section of Advanced Heart Failure and Cardiac Transplantation  Division of Cardiovascular Medicine  [facility] [place]  [facility]

## 2024-08-13 ENCOUNTER — APPOINTMENT (OUTPATIENT)
Dept: CARDIOLOGY | Facility: CLINIC | Age: 74
End: 2024-08-13
Payer: MEDICARE

## 2024-08-14 ENCOUNTER — TELEPHONE (OUTPATIENT)
Dept: CARDIOLOGY | Facility: CLINIC | Age: 74
End: 2024-08-14
Payer: MEDICARE

## 2024-08-14 NOTE — TELEPHONE ENCOUNTER
----- Message from Don Kuhn sent at 8/14/2024 12:37 PM EDT -----  Regarding: Holter monitor  Please send Holter monitor results to patient:    Unremarkable 24-hour Holter monitor, it demonstrated normal sinus rhythm mechanism with an average heart rate of 58 bpm.  There was 313 isolated PVCs with 10 PACs with no complex arrhythmias.  An episode of chest pain reported by the patient correlated with normal sinus rhythm and normal heart rate  ----- Message -----  From: Fátima Mcgill MD  Sent: 8/4/2024   2:03 PM EDT  To: Don Kuhn, DO

## 2024-08-14 NOTE — TELEPHONE ENCOUNTER
Result Communication    Resulted Orders   Holter Or Event Cardiac Monitor    Narrative    1-the rhythm was sinus throughout the recording with an average heart rate   of 58 bpm, the minimum heart rate was 47 bpm sinus bradycardia at 12:47 AM   and the maximal heart rate was 103 bpm sinus tachycardia at 9:16 AM  2-occasional isolated premature ventricular complexes, there was a total   number of 313 beats in 24 hours representing 0.4% of total QRS complexes   with 1 event of ventricular couplet  3-rare isolated premature atrial complexes, there was 10 beats in 24 hours  4-no pauses exceeding 2 seconds were seen  5- an episode of chest pain reported by the patient correlated with normal   sinus rhythm and normal heart rate    Conclusion:    Unremarkable 24-hour Holter monitor, it demonstrated normal sinus rhythm   mechanism with an average heart rate of 58 bpm.  There was 313 isolated   PVCs with 10 PACs with no complex arrhythmias.  An episode of chest pain   reported by the patient correlated with normal sinus rhythm and normal   heart rate         1:07 PM      Results were not successfully communicated with the patient and they did not acknowledge their understanding.

## 2024-09-24 ENCOUNTER — APPOINTMENT (OUTPATIENT)
Dept: CARDIOLOGY | Facility: CLINIC | Age: 74
End: 2024-09-24
Payer: MEDICARE

## 2024-09-24 VITALS
HEART RATE: 82 BPM | DIASTOLIC BLOOD PRESSURE: 72 MMHG | WEIGHT: 127 LBS | OXYGEN SATURATION: 98 % | HEIGHT: 69 IN | BODY MASS INDEX: 18.81 KG/M2 | SYSTOLIC BLOOD PRESSURE: 124 MMHG

## 2024-09-24 DIAGNOSIS — I25.10 ASCVD (ARTERIOSCLEROTIC CARDIOVASCULAR DISEASE): ICD-10-CM

## 2024-09-24 DIAGNOSIS — I25.5 ISCHEMIC CARDIOMYOPATHY: ICD-10-CM

## 2024-09-24 DIAGNOSIS — I50.20 ACC/AHA STAGE C SYSTOLIC HEART FAILURE: Primary | ICD-10-CM

## 2024-09-24 DIAGNOSIS — M62.84 SARCOPENIA: ICD-10-CM

## 2024-09-24 PROCEDURE — 1159F MED LIST DOCD IN RCRD: CPT | Performed by: INTERNAL MEDICINE

## 2024-09-24 PROCEDURE — 3008F BODY MASS INDEX DOCD: CPT | Performed by: INTERNAL MEDICINE

## 2024-09-24 PROCEDURE — 1160F RVW MEDS BY RX/DR IN RCRD: CPT | Performed by: INTERNAL MEDICINE

## 2024-09-24 PROCEDURE — 99214 OFFICE O/P EST MOD 30 MIN: CPT | Performed by: INTERNAL MEDICINE

## 2024-09-24 PROCEDURE — 1036F TOBACCO NON-USER: CPT | Performed by: INTERNAL MEDICINE

## 2024-09-24 NOTE — PROGRESS NOTES
"    [facility]  Primary Care Physician: Lewis Owens DO  Referring Provider/Cardiologist: Bam     Date of Visit: 09/24/2024  3:00 PM EDT  Location of visit: [address]     HPI:   Mr. Valerio is a 74M with a PMHx sig for CAD s/p PCI (diag; 2001), stage C systolic HF/ICM/HFrEF with moderate LV dysfunction currently without an ICD, pAF on AAD and DOAC therapies, and remote h/o polio who returns to the Advanced Heart Failure clinic for ongoing evaluation and management.     Initially diagnosed with ICM/HFrEF when he presented to [facility] in January '24 with progressive dyspnea and chest discomfort. At that time he was noted to have LV dysfunction. A cath noted pLAD occlusion/. A cMRI was performed noting that most of the anterior had viability. After consultation with CTS his CABG was deferred in light of weight loss/sarcopenia. He was told to \"bulk up\" and then he would be reconsidered. He has since relocated to [facility] and was referred to /[facility] for evaluation.     Interval hx:   Since our initial encounter he has been taking in 3-4 boosts per day with a weight gain of ~4 lbs. He is more active and feels better.     He currently denies chest pain, palpitations, shortness of breath, dyspnea on exertion, orthopnea, PND. No edema noted in BLE. He denies headaches, dizziness or recent falls.       PMHx:  Remote history of polio (age 18 months), CAD s/p PCI (diag), stage C systolic HF/ICM/HFrEF with moderate LV dysfunction currently without an ICD, pAF on AAD and DOAC therapies    SocHx:  Lives in [place] with GF  Quit smoking (1/2024); 0.5-0.75 ppd for his adult life  Occ etOH, denies illicits    FamHx:  Father with CAD/MI  Mother with CVA        Current Outpatient Medications   Medication Sig Dispense Refill    amiodarone (Pacerone) 200 mg tablet Take 1 tablet (200 mg) by mouth once daily.      apixaban (Eliquis) 5 mg tablet " "Take 1 tablet (5 mg) by mouth 2 times a day.      ARIPiprazole (Abilify) 5 mg tablet Take 1.5 tablets (7.5 mg) by mouth once daily at bedtime.      aspirin 81 mg EC tablet Take 1 tablet (81 mg) by mouth once daily.      atorvastatin (Lipitor) 80 mg tablet Take 1 tablet (80 mg) by mouth once daily.      furosemide (Lasix) 40 mg tablet Take 0.5 tablets (20 mg) by mouth once daily.      lamoTRIgine (LaMICtal) 150 mg tablet Take 1 tablet (150 mg) by mouth once daily at bedtime.      lamoTRIgine (LaMICtal) 200 mg tablet Take 1 tablet (200 mg) by mouth once daily in the morning.      sacubitriL-valsartan (Entresto) 24-26 mg tablet Take 0.5 tablets by mouth 2 times a day.       No current facility-administered medications for this visit.       Allergies   Allergen Reactions    Farxiga [Dapagliflozin] Nausea/vomiting       Visit Vitals  /72 (BP Location: Left arm, Patient Position: Sitting)   Pulse 82   Ht 1.753 m (5' 9\")   Wt 57.6 kg (127 lb)   SpO2 98%   BMI 18.75 kg/m²   Smoking Status Former   BSA 1.67 m²        Physical Exam:  On exam Mr. Valerio appears sarcopenic, is alert and oriented x3, and in no acute distress. His sclera are anicteric and his oropharynx has moist mucous membranes. His neck is supple and without thyromegaly. The JVP is ~5 cm of water above the right atrium. There is a faint bruit on the left. His cardiac exam has regular rhythm, normal S1, S2. No S3/4. There are no murmurs. His lungs are clear to auscultation bilaterally and there is no dullness to percussion. His abdomen is soft, nontender with normoactive bowel sounds. There is no HJR. The extremities are warm and without edema. The skin is dry. There is no rash present. The distal pulses are 2+ in all four extremities. His mood and affect are appropriate for todays encounter.       Cardiac Labs/Diagnostics:  Holter (7/23/24):  1-the rhythm was sinus throughout the recording with an average heart rate of 58 bpm, the minimum heart rate " was 47 bpm sinus bradycardia at 12:47 AM and the maximal heart rate was 103 bpm sinus tachycardia at 9:16 AM  2-occasional isolated premature ventricular complexes, there was a total number of 313 beats in 24 hours representing 0.4% of total QRS complexes with 1 event of ventricular couplet  3-rare isolated premature atrial complexes, there was 10 beats in 24 hours  4-no pauses exceeding 2 seconds were seen  5- an episode of chest pain reported by the patient correlated with normal sinus rhythm and normal heart rate    cMRI (1/15/24):  1. Mildly dilated left ventricle with moderately reduced systolic function. LVEF = 32%. There is severe   hypokinesis of the mid to basal anterior wall with mild to moderate global hypokinesis. Delayed Gadolinium   enhancement imaging demonstrates non-transmural scar (26-50% wall thickness) in the mid anterior wall .   There is transmural scar (50-75% wall thickness) in the mid anterolateral wall that is non-viable. All   remaining myocardial segments are viable. Overall, findings suggest that majority of the LAD territory is   viable. The segment of the mid anterolateral wall that is non-viable is likely diagonal branch territory.   2. Normal right ventricular size and systolic function. RVEF = 56%.   3. Moderate mitral regurgitation (regurgitant volume 33 ml, regurgitant fraction 46%).   4. Mild to moderate aortic regurgitation.   5. Mildly dilated aortic root (4.1 cm) and ascending aorta (4.2 cm).   6. Extra-cardiac: Non-cardiac findings visualized on this study will be independently reviewed by a   radiologist. Please see the separate Radiologist report.     Cardiac cath (1/12/24):  LM: Mild disease.   LAD: The LAD is occluded in the proximal segment following a trifurcation with a large septal and large diagonal branch.  The diagonal branch has a patent proximal vessel previously placed stent with up to 50% ISR.  The large septal at this trifurcation also has moderate diffuse  disease.  Both the diagonal and septal feed collateralization to the distal LAD.   LCX: Mild disease and up to 40% mid vessel lesion.   RCA: Moderate-sized vessel, mild diffuse disease.  Dominant.   LVEDP: 9 mmHg   Access: Right radial artery, TR compression band.     Carotid ultrasounds (1/12/24):  Right.   * Less than 50% stenosis in the right internal carotid artery.   * Right vertebral artery is patent with antegrade flow.   * No evidence of hemodynamically significant stenosis in the right subclavian, external carotid and common carotid arteries.   * The right vertebral artery flow is dampened.   Left.   * Less than 50% stenosis in the left internal carotid artery.   * Left vertebral artery is patent with antegrade flow.   * No evidence of hemodynamically significant stenosis in the left common carotid, external carotid and subclavian arteries.     Right Heart Catheterization   RA:8 mean mmHg   RV:36/2 mmHg   PA: 40/19 (27) mmHg   PCWP: 17/20 (20) mmHg     Thermal CO/CI: 2.52 L/m / 1.47 L/min/m2   Meryl CO/CI: 3.46 L/m / 2.02 L/min/m2       Impression/Plan:  Mr. Valerio is a 74M with a PMHx sig for CAD s/p PCI (diag; 2001), stage C systolic HF/ICM/HFrEF with moderate LV dysfunction currently without an ICD, pAF on AAD and DOAC therapies, and remote h/o polio who returns to the Advanced Heart Failure clinic for ongoing evaluation and management. At the current time he has functional class II symptoms and is euvolemic on exam. He does appear frail and sarcopenic with protein calorie malnutrition.     1) Stage C chronic systolic HF/ICM/HFrEF with moderate LV dysfunction (LVEF 32%; 1/2024) currently without an ICD  Currently on maximally tolerated GDMT (limited by BP). RHC in January with reduced CO/CI with minimally elevated left sided filling pressure. Of note, not on a GDMT BB given his reduced CO/CI. No signs of volume overload.   -c/w entresto 12/13 mg bid, furosemide 20 mg daily  -will update an echo in  "preparation for discussion (see below)  -will refer to cardiac rehab    2) CAD s/p PCI (diag; 2001)  Cath in 1/2024 with pLAD occlusion/. cMRI with viability in most of the anterior wall. CABG deferred at that time in light of ongoing sarcopenia and protein calorie malnutrition. Since our initial encounter he has only gained ~4 lbs. He continues to take in 3-4 Boosts per day. I am not sure how much more he will be able to \"bulk up\". I will discuss his case with Dr. Carrasco; pending the discussion, will discuss further at [facility] for CABG (MIDCAB) vs  PCI given his function status.    -c/w ASA, statin    3) pAF on AAD and DOAC therapies  Previously required cardioversion. Denies bleeding.   -c/w amiodarone 200 mg daily, eliquis 5 mg bid        F/U: TBD at /[facility]    Davon,  Thank you for referring Mr. Valerio to the  Advanced Heart Failure Clinic. Please let me know if you have any questions.       ____________________________________________________________  Froilan Thornton DO  Section of Advanced Heart Failure and Cardiac Transplantation  Division of Cardiovascular Medicine  [facility] [place]  [facility]  "

## 2024-09-24 NOTE — PATIENT INSTRUCTIONS
It was a pleasure seeing you today. Please contact myself or my team with any questions.     To reach Dr. Thornton' office please call [phone] (Pam).   Fax: [phone]   To schedule an appointment call [phone]     If you have any questions or need cardiac medication refills, please call the Heart Failure office at [phone], option 6. You may also contact the  Heart Failure Nursing team via email at [email or web address] (Please include your name and date of birth).        1) Continue to use protein shakes  2) We will refer you to cardiac rehab at [facility]  3) Follow up to be determined

## 2024-09-25 PROBLEM — M62.84 SARCOPENIA: Status: ACTIVE | Noted: 2024-09-25

## 2024-10-07 DIAGNOSIS — I48.0 PAROXYSMAL ATRIAL FIBRILLATION (MULTI): Primary | ICD-10-CM

## 2024-10-07 RX ORDER — AMIODARONE HYDROCHLORIDE 200 MG/1
200 TABLET ORAL DAILY
Qty: 90 TABLET | Refills: 3 | Status: SHIPPED | OUTPATIENT
Start: 2024-10-07 | End: 2025-10-07

## 2024-10-15 ENCOUNTER — TELEPHONE (OUTPATIENT)
Dept: CARDIOLOGY | Facility: CLINIC | Age: 74
End: 2024-10-15
Payer: MEDICARE

## 2024-10-15 DIAGNOSIS — I50.20 ACC/AHA STAGE C SYSTOLIC HEART FAILURE: ICD-10-CM

## 2024-10-15 DIAGNOSIS — I25.5 ISCHEMIC CARDIOMYOPATHY: ICD-10-CM

## 2024-10-15 RX ORDER — SACUBITRIL AND VALSARTAN 24; 26 MG/1; MG/1
0.5 TABLET, FILM COATED ORAL 2 TIMES DAILY
Qty: 90 TABLET | Refills: 3 | Status: CANCELLED | OUTPATIENT
Start: 2024-10-15 | End: 2025-10-15

## 2024-10-17 NOTE — TELEPHONE ENCOUNTER
Clerical attempted to phone patient two times, messages left. Rx request taken out of chart. To SO clinical to phone for follow up.

## 2024-10-23 ENCOUNTER — TELEPHONE (OUTPATIENT)
Dept: CARDIOLOGY | Facility: HOSPITAL | Age: 74
End: 2024-10-23

## 2024-10-23 DIAGNOSIS — I50.20 ACC/AHA STAGE C SYSTOLIC HEART FAILURE: Primary | ICD-10-CM

## 2024-10-23 RX ORDER — SACUBITRIL AND VALSARTAN 24; 26 MG/1; MG/1
0.5 TABLET, FILM COATED ORAL 2 TIMES DAILY
Qty: 30 TABLET | Refills: 10 | Status: SHIPPED | OUTPATIENT
Start: 2024-10-23

## 2024-10-24 ENCOUNTER — APPOINTMENT (OUTPATIENT)
Dept: CARDIOLOGY | Facility: CLINIC | Age: 74
End: 2024-10-24
Payer: MEDICARE

## 2024-10-30 ENCOUNTER — OFFICE VISIT (OUTPATIENT)
Dept: CARDIAC SURGERY | Facility: HOSPITAL | Age: 74
End: 2024-10-30
Payer: MEDICARE

## 2024-10-30 VITALS
SYSTOLIC BLOOD PRESSURE: 112 MMHG | BODY MASS INDEX: 19.58 KG/M2 | HEART RATE: 65 BPM | HEIGHT: 69 IN | WEIGHT: 132.2 LBS | DIASTOLIC BLOOD PRESSURE: 69 MMHG | OXYGEN SATURATION: 97 %

## 2024-10-30 DIAGNOSIS — R53.1 WEAKNESS: ICD-10-CM

## 2024-10-30 DIAGNOSIS — I25.10 CORONARY ARTERY DISEASE INVOLVING NATIVE CORONARY ARTERY OF NATIVE HEART, UNSPECIFIED WHETHER ANGINA PRESENT: Primary | ICD-10-CM

## 2024-10-30 PROCEDURE — 3008F BODY MASS INDEX DOCD: CPT | Performed by: STUDENT IN AN ORGANIZED HEALTH CARE EDUCATION/TRAINING PROGRAM

## 2024-10-30 PROCEDURE — 1159F MED LIST DOCD IN RCRD: CPT | Performed by: STUDENT IN AN ORGANIZED HEALTH CARE EDUCATION/TRAINING PROGRAM

## 2024-10-30 PROCEDURE — 99215 OFFICE O/P EST HI 40 MIN: CPT | Performed by: STUDENT IN AN ORGANIZED HEALTH CARE EDUCATION/TRAINING PROGRAM

## 2024-10-30 PROCEDURE — 99205 OFFICE O/P NEW HI 60 MIN: CPT | Performed by: STUDENT IN AN ORGANIZED HEALTH CARE EDUCATION/TRAINING PROGRAM

## 2024-10-30 PROCEDURE — 1126F AMNT PAIN NOTED NONE PRSNT: CPT | Performed by: STUDENT IN AN ORGANIZED HEALTH CARE EDUCATION/TRAINING PROGRAM

## 2024-10-30 ASSESSMENT — PAIN SCALES - GENERAL: PAINLEVEL_OUTOF10: 0-NO PAIN

## 2024-11-21 ENCOUNTER — TELEPHONE (OUTPATIENT)
Dept: CARDIOLOGY | Facility: HOSPITAL | Age: 74
End: 2024-11-21

## 2024-11-21 DIAGNOSIS — I48.91 ATRIAL FIBRILLATION, UNSPECIFIED TYPE (MULTI): Primary | ICD-10-CM

## 2024-11-21 DIAGNOSIS — I50.20 ACC/AHA STAGE C SYSTOLIC HEART FAILURE: ICD-10-CM

## 2024-11-22 ENCOUNTER — HOSPITAL ENCOUNTER (OUTPATIENT)
Dept: RADIOLOGY | Facility: HOSPITAL | Age: 74
Discharge: HOME | End: 2024-11-22
Payer: MEDICARE

## 2024-11-22 DIAGNOSIS — I25.10 CORONARY ARTERY DISEASE INVOLVING NATIVE CORONARY ARTERY OF NATIVE HEART, UNSPECIFIED WHETHER ANGINA PRESENT: ICD-10-CM

## 2024-11-22 PROCEDURE — 74176 CT ABD & PELVIS W/O CONTRAST: CPT

## 2024-11-25 ENCOUNTER — HOSPITAL ENCOUNTER (OUTPATIENT)
Dept: CARDIOLOGY | Facility: CLINIC | Age: 74
Discharge: HOME | End: 2024-11-25
Payer: MEDICARE

## 2024-11-25 VITALS
HEIGHT: 69 IN | SYSTOLIC BLOOD PRESSURE: 126 MMHG | BODY MASS INDEX: 19.55 KG/M2 | WEIGHT: 132 LBS | DIASTOLIC BLOOD PRESSURE: 68 MMHG

## 2024-11-25 DIAGNOSIS — I25.10 CORONARY ARTERY DISEASE INVOLVING NATIVE CORONARY ARTERY OF NATIVE HEART, UNSPECIFIED WHETHER ANGINA PRESENT: ICD-10-CM

## 2024-11-25 LAB
AORTIC VALVE MEAN GRADIENT: 4 MMHG
AORTIC VALVE PEAK VELOCITY: 1.31 M/S
AV PEAK GRADIENT: 7 MMHG
AVA (PEAK VEL): 2.95 CM2
AVA (VTI): 3.23 CM2
EJECTION FRACTION APICAL 4 CHAMBER: 58.5
EJECTION FRACTION: 55 %
LEFT ATRIUM VOLUME AREA LENGTH INDEX BSA: 20.3 ML/M2
LEFT VENTRICLE INTERNAL DIMENSION DIASTOLE: 5.67 CM (ref 3.5–6)
LEFT VENTRICULAR OUTFLOW TRACT DIAMETER: 2.31 CM
LV EJECTION FRACTION BIPLANE: 55 %
MITRAL VALVE E/A RATIO: 0.73
RIGHT VENTRICLE PEAK SYSTOLIC PRESSURE: 21 MMHG

## 2024-11-25 PROCEDURE — 93306 TTE W/DOPPLER COMPLETE: CPT

## 2024-11-25 PROCEDURE — 93306 TTE W/DOPPLER COMPLETE: CPT | Performed by: INTERNAL MEDICINE

## 2024-12-04 ENCOUNTER — TELEMEDICINE (OUTPATIENT)
Dept: CARDIAC SURGERY | Facility: HOSPITAL | Age: 74
End: 2024-12-04
Payer: MEDICARE

## 2024-12-04 DIAGNOSIS — I25.10 CORONARY ARTERY DISEASE INVOLVING NATIVE CORONARY ARTERY OF NATIVE HEART, UNSPECIFIED WHETHER ANGINA PRESENT: Primary | ICD-10-CM

## 2024-12-04 PROCEDURE — 99213 OFFICE O/P EST LOW 20 MIN: CPT | Performed by: STUDENT IN AN ORGANIZED HEALTH CARE EDUCATION/TRAINING PROGRAM

## 2024-12-04 NOTE — PROGRESS NOTES
Phone visit today: patient is doing well and continuing to gain weight.  Now 132lbs.  Repeat ECHO demonstrates relatively good LV function.  Patient denies any chest pain.  Has hx of stent to OM and last cath was one year ago with mild in stent re-stenosis.  Is being followed by Dr. Thornton for heart failure management.  Discussed pt with him and we both think it is prudent to repeat the cath to evaluate for any progression of disease.  Will see him after the cath in person in clinic and plan for operative revascularization.

## 2024-12-05 DIAGNOSIS — I25.10 ASCVD (ARTERIOSCLEROTIC CARDIOVASCULAR DISEASE): ICD-10-CM

## 2024-12-05 DIAGNOSIS — I25.5 ISCHEMIC CARDIOMYOPATHY: ICD-10-CM

## 2024-12-05 DIAGNOSIS — Z01.810 PREOP CARDIOVASCULAR EXAM: ICD-10-CM

## 2024-12-05 DIAGNOSIS — I50.20 ACC/AHA STAGE C SYSTOLIC HEART FAILURE: ICD-10-CM

## 2024-12-05 DIAGNOSIS — Z98.61 CORONARY ARTERIOSCLEROSIS AFTER PERCUTANEOUS TRANSLUMINAL CORONARY ANGIOPLASTY (PTCA): ICD-10-CM

## 2024-12-05 DIAGNOSIS — E78.2 MIXED HYPERLIPIDEMIA: ICD-10-CM

## 2024-12-05 DIAGNOSIS — I50.41 ACUTE COMBINED SYSTOLIC AND DIASTOLIC HEART FAILURE: ICD-10-CM

## 2024-12-05 DIAGNOSIS — I25.2 HISTORY OF MI (MYOCARDIAL INFARCTION): ICD-10-CM

## 2024-12-05 DIAGNOSIS — I25.10 CORONARY ARTERIOSCLEROSIS AFTER PERCUTANEOUS TRANSLUMINAL CORONARY ANGIOPLASTY (PTCA): ICD-10-CM

## 2024-12-05 NOTE — PROGRESS NOTES
Message received from Dr. Don Kuhn, DO to schedule patient for limited heart cath to re-evaluate Ramus Branch. Order placed. No dye allergies noted or diabetic medications.

## 2024-12-06 ENCOUNTER — TELEPHONE (OUTPATIENT)
Dept: CARDIOLOGY | Facility: CLINIC | Age: 74
End: 2024-12-06
Payer: MEDICARE

## 2024-12-06 NOTE — TELEPHONE ENCOUNTER
See 12/5/24 message-  Left message for patient to call office to schedule heart cath at [facility] with Dr. Don Kuhn, DO

## 2024-12-09 NOTE — TELEPHONE ENCOUNTER
Patient's wife phoned would like to have schedule patient's cath as soon as possible, please phone back at [phone] c/o Elizabeth.

## 2024-12-10 ENCOUNTER — TELEPHONE (OUTPATIENT)
Dept: CARDIOLOGY | Facility: CLINIC | Age: 74
End: 2024-12-10
Payer: MEDICARE

## 2024-12-10 NOTE — TELEPHONE ENCOUNTER
Heart Cath/70108 DX-I25.2, I25.10, Z98.61, E78.2, Z01.810, I25.5, I50.41 and I50.20  auth pending review with [facility] Order ID# [id number]

## 2024-12-11 ENCOUNTER — TELEPHONE (OUTPATIENT)
Dept: CARDIOLOGY | Facility: CLINIC | Age: 74
End: 2024-12-11
Payer: MEDICARE

## 2024-12-11 NOTE — TELEPHONE ENCOUNTER
Heart Cath/45905 DX-I25.2, I25.10, Z98.61, E78.2, Z01.810, I25.5, I50.41 and I50.20 is approved auth# [id number] valid 12/10/24--3/9/25 per [employer] portal.

## 2024-12-12 LAB
NON-UH HIE ANION GAP: NORMAL MEQ/L (ref 6–15)
NON-UH HIE BASOPHILS # (AUTO): NORMAL 10*3/UL (ref 0–0.2)
NON-UH HIE BASOPHILS % (AUTO): NORMAL %
NON-UH HIE BLOOD UREA NITROGEN: NORMAL MG/DL (ref 7–25)
NON-UH HIE CARBON DIOXIDE: NORMAL MMOL/L (ref 21–31)
NON-UH HIE CHLORIDE: NORMAL MMOL/L (ref 98–107)
NON-UH HIE CHOL/HDL RATIO: ABNORMAL
NON-UH HIE CHOLESTEROL: ABNORMAL MG/DL (ref 140–200)
NON-UH HIE CREATININE: NORMAL MG/DL (ref 0.7–1.3)
NON-UH HIE EOSINOPHILS # (AUTO): NORMAL 10*3/UL (ref 0–0.45)
NON-UH HIE EOSINOPHILS % (AUTO): NORMAL %
NON-UH HIE ESTIMATED GFR: >60 ML/MIN
NON-UH HIE HDL CHOLESTEROL: ABNORMAL MG/DL (ref 23–92)
NON-UH HIE HEMATOCRIT: NORMAL % (ref 38.8–50)
NON-UH HIE HEMOGLOBIN: NORMAL G/DL (ref 13–17)
NON-UH HIE INR: NORMAL
NON-UH HIE LDL CHOLESTEROL,CALCULATED: ABNORMAL MG/DL (ref 0–100)
NON-UH HIE LYMPHOCYTES # (AUTO): NORMAL 10*3/UL (ref 1–4.8)
NON-UH HIE LYMPHOCYTES % (AUTO): NORMAL %
NON-UH HIE MEAN CORPUSCULAR HEMOGLOBIN: NORMAL PG (ref 27.5–35.2)
NON-UH HIE MEAN CORPUSCULAR HGB CONC: NORMAL G/DL (ref 32.5–35.6)
NON-UH HIE MEAN CORPUSCULAR VOLUME: NORMAL FL (ref 83.5–101)
NON-UH HIE MEAN PLATELET VOLUME: NORMAL FL (ref 6.6–10.1)
NON-UH HIE MONOCYTES # (AUTO): NORMAL 10*3/UL (ref 0–0.8)
NON-UH HIE MONOCYTES % (AUTO): NORMAL %
NON-UH HIE NEUTROPHILS # (AUTO): NORMAL 10*3/UL (ref 1.8–7.7)
NON-UH HIE NEUTROPHILS % (AUTO): NORMAL %
NON-UH HIE NRBC%: NORMAL /100{WBC} (ref 0–0.5)
NON-UH HIE PARTIAL THROMBOPLASTIN TIME: NORMAL S (ref 25.1–36.5)
NON-UH HIE PLATELET COUNT: NORMAL 10*3/UL (ref 150–450)
NON-UH HIE POTASSIUM: NORMAL MMOL/L (ref 3.5–5.1)
NON-UH HIE PROTHROMBIN TIME: NORMAL S (ref 9–12.9)
NON-UH HIE RED BLOOD COUNT: NORMAL 10*6/UL (ref 3.9–5.6)
NON-UH HIE RED CELL DISTRIBUTION WIDTH: NORMAL % (ref 12–14.8)
NON-UH HIE SODIUM: NORMAL MMOL/L (ref 136–145)
NON-UH HIE TRIGLYCERIDE W/REFLEX: ABNORMAL MG/DL (ref 0–149)
NON-UH HIE UNCORRECTED WBC: NORMAL 10*3/UL (ref 4.1–10.5)
NON-UH HIE VLDL CHOLESTEROL: ABNORMAL MG/DL
NON-UH HIE WHITE BLOOD COUNT: NORMAL 10*3/UL (ref 4.1–10.5)

## 2024-12-12 NOTE — TELEPHONE ENCOUNTER
PST from [facility] phones requesting to know how long patient should hold eliquis prior to heart cath on Monday 12/16. Per Kiara pt should hold eliquis for 4 days prior.    Attempted to phone [facility] PST back and left detailed message to hold eliquis 4 days prior to cath.     [facility] [phone]

## 2025-01-06 ENCOUNTER — HOSPITAL ENCOUNTER (OUTPATIENT)
Dept: CARDIOLOGY | Facility: HOSPITAL | Age: 75
Discharge: HOME | End: 2025-01-06
Payer: MEDICARE

## 2025-01-06 DIAGNOSIS — I25.10 CORONARY ARTERY DISEASE INVOLVING NATIVE CORONARY ARTERY OF NATIVE HEART, UNSPECIFIED WHETHER ANGINA PRESENT: ICD-10-CM

## 2025-01-06 NOTE — PROGRESS NOTES
[facility]    Referred by Dr. Thornton for CABG Evaluation    Chief Complaint  Follow up testing, discuss surgery    HPI:   Mr. Parish Valerio is an 74 y.o. male, who is a patient of Dr.Timothy DAYAN Owens DO.  I have been asked to see him by Dr. Thornton to evaluate Coronary Artery Disease.    Parish Valerio has a PMHx signifcant for CAD s/p PCI (diag; ), stage C systolic HF/ICM/HFrEF with moderate LV dysfunction currently without an ICD, pAF on AAD and DOAC therapies, and remote h/o polio. He is being followed by Dr. Thornton for heart failure management.  He is doing well and continuing to gain weight.  Repeat ECHO demonstrates relatively good LV function. Patient denies any chest pain.       No past medical history on file.    Past Surgical History:   Procedure Laterality Date    CORONARY ANGIOPLASTY WITH STENT PLACEMENT      DENTAL SURGERY      TESTICLE SURGERY         Family History   Problem Relation Name Age of Onset    Stroke Mother      Heart attack Father         Social History     Socioeconomic History    Marital status:      Spouse name: Not on file    Number of children: Not on file    Years of education: Not on file    Highest education level: Not on file   Occupational History    Not on file   Tobacco Use    Smoking status: Former     Current packs/day: 0.00     Types: Cigarettes     Quit date: 2024     Years since quittin.0    Smokeless tobacco: Never   Substance and Sexual Activity    Alcohol use: Yes     Comment: socially    Drug use: Never    Sexual activity: Not on file   Other Topics Concern    Not on file   Social History Narrative    Not on file     Social Drivers of Health     Financial Resource Strain: Low Risk  (2021)    Received from [facility]    Overall Financial Resource Strain (CARDIA)     Difficulty of Paying Living Expenses: Not very hard   Food Insecurity: No Food Insecurity (2024)    Received from [facility]     Hunger Vital Sign     Worried About Running Out of Food in the Last Year: Never true     Ran Out of Food in the Last Year: Never true   Transportation Needs: No Transportation Needs (1/9/2024)    Received from [facility]    PRAPARE - Transportation     Lack of Transportation (Medical): No     Lack of Transportation (Non-Medical): No   Physical Activity: Not on file   Stress: Not on file   Social Connections: Unknown (7/7/2021)    Received from [facility]    Social Connection and Isolation Panel [NHANES]     Frequency of Communication with Friends and Family: Not on file     Frequency of Social Gatherings with Friends and Family: Not on file     Attends Uatsdin Services: Not on file     Active Member of Clubs or Organizations: No     Attends Club or Organization Meetings: Never     Marital Status: Not on file   Intimate Partner Violence: Not At Risk (1/9/2024)    Received from [facility]    Humiliation, Afraid, Rape, and Kick questionnaire     Fear of Current or Ex-Partner: No     Emotionally Abused: No     Physically Abused: No     Sexually Abused: No   Housing Stability: Not on file       Allergies   Allergen Reactions    Farxiga [Dapagliflozin] Nausea/vomiting       Outpatient Encounter Medications as of 1/8/2025   Medication Sig Dispense Refill    amiodarone (Pacerone) 200 mg tablet Take 1 tablet (200 mg) by mouth once daily. 90 tablet 3    apixaban (Eliquis) 5 mg tablet Take 1 tablet (5 mg) by mouth 2 times a day. 180 tablet 3    ARIPiprazole (Abilify) 5 mg tablet Take 1 tablet (5 mg) by mouth once daily at bedtime.      aspirin 81 mg EC tablet Take 1 tablet (81 mg) by mouth once daily.      atorvastatin (Lipitor) 80 mg tablet Take 1 tablet (80 mg) by mouth once daily.      furosemide (Lasix) 40 mg tablet Take 0.5 tablets (20 mg) by mouth once daily.      lamoTRIgine (LaMICtal) 150 mg tablet Take 1 tablet (150 mg) by mouth once daily at bedtime.      lamoTRIgine (LaMICtal) 200 mg tablet Take 1 tablet (200  "mg) by mouth once daily in the morning.      sacubitriL-valsartan (Entresto) 24-26 mg tablet Take 0.5 tablets by mouth 2 times a day. 30 tablet 10     No facility-administered encounter medications on file as of 1/8/2025.         Physical Exam:   General: no acute distress  CV: regular rate and rhythm  Resp: symmetrical chest rise and fall, no increased work of breathing  Extremities: moving all extremities  Abdomen: soft non tender, non distended      No results found for: \"WBC\", \"HGB\", \"HCT\", \"MCV\", \"PLT\"  No results found for: \"GLUCOSE\", \"CALCIUM\", \"NA\", \"K\", \"CO2\", \"CL\", \"BUN\", \"CREATININE\"      Pertinent Diagnostics:    TTE (11/25/24)  1. Left ventricular ejection fraction is normal, calculated by Bran's biplane at 55%.   2. Spectral Doppler shows a Grade I (impaired relaxation pattern) of left ventricular diastolic filling with normal left atrial filling pressure.   3. There is no evidence of left ventricular hypertrophy.   4. There is normal right ventricular global systolic function.   5. Moderate mitral valve regurgitation.   6. No evidence of mitral valve prolapse.   7. There is aortic valve annular calcification.   8. Mild to moderate aortic valve regurgitation    Cardiac Catheterization (12/16/24)    CT (11/24/24)  1. Ectatic ascending aortic measuring up to 4.3 cm.   2. Hypodense peripherally calcified structure along the aortic arch   wall which may represent partially calcified plaque or a small   dissection. Recommend further evaluation with CTA of the chest.   3. Coronary artery calcifications, recommend correlation with   cardiovascular risk factors.   4. Findings of respiratory bronchiolitis/smoking related small   airways disease.   5. Few small noncalcified pulmonary nodules measuring 2-3 mm.   Incidental Finding:  A non-calcified pulmonary nodule / multiple   non-calcified pulmonary nodules measuring less than 6 mm, likely   benign.       Assessment and Plan:   Mr. Parish Valerio is an 74 " y.o. male who has been referred with Coronary Artery Disease.      Today, he looks very well and had met all goals to be ready for surgery  Will plan for robotic assisted minimally invasive cabg with LIMA to LAD    The risks benefits and alternatives were discussed with the patient and include but are not limited to, bleeding, infection, injury to other structures, need for re-operation, arrhythmia, respiratory failure, prolonged intubation, stroke, and death.  These were discussed with the patient in detail, all questions were answered and informed consent was obtained.      ____________________________________________________________  Sofia Carrasco MD  Assistant Professor of Surgery  Division of Cardiac Surgery    [facility]  [facility]

## 2025-01-08 ENCOUNTER — OFFICE VISIT (OUTPATIENT)
Dept: CARDIAC SURGERY | Facility: HOSPITAL | Age: 75
End: 2025-01-08
Payer: MEDICARE

## 2025-01-08 ENCOUNTER — LAB (OUTPATIENT)
Dept: LAB | Facility: LAB | Age: 75
End: 2025-01-08
Payer: MEDICARE

## 2025-01-08 ENCOUNTER — HOSPITAL ENCOUNTER (OUTPATIENT)
Dept: RADIOLOGY | Facility: HOSPITAL | Age: 75
Discharge: HOME | End: 2025-01-08
Payer: MEDICARE

## 2025-01-08 VITALS
SYSTOLIC BLOOD PRESSURE: 103 MMHG | OXYGEN SATURATION: 97 % | WEIGHT: 136 LBS | HEIGHT: 69 IN | BODY MASS INDEX: 20.14 KG/M2 | DIASTOLIC BLOOD PRESSURE: 64 MMHG | HEART RATE: 72 BPM

## 2025-01-08 DIAGNOSIS — R82.90 ABNORMAL URINE FINDING: ICD-10-CM

## 2025-01-08 DIAGNOSIS — R07.9 CHEST PAIN, UNSPECIFIED TYPE: ICD-10-CM

## 2025-01-08 DIAGNOSIS — I25.10 CORONARY ARTERY DISEASE INVOLVING NATIVE CORONARY ARTERY OF NATIVE HEART, UNSPECIFIED WHETHER ANGINA PRESENT: ICD-10-CM

## 2025-01-08 DIAGNOSIS — I25.10 CORONARY ARTERY DISEASE INVOLVING NATIVE CORONARY ARTERY OF NATIVE HEART, UNSPECIFIED WHETHER ANGINA PRESENT: Primary | ICD-10-CM

## 2025-01-08 LAB
ABO GROUP (TYPE) IN BLOOD: NORMAL
ALBUMIN SERPL BCP-MCNC: 4.5 G/DL (ref 3.4–5)
ALP SERPL-CCNC: 73 U/L (ref 33–136)
ALT SERPL W P-5'-P-CCNC: 50 U/L (ref 10–52)
ANION GAP SERPL CALC-SCNC: 14 MMOL/L (ref 10–20)
ANTIBODY SCREEN: NORMAL
APPEARANCE UR: CLEAR
APTT PPP: 28 SECONDS (ref 27–38)
AST SERPL W P-5'-P-CCNC: 39 U/L (ref 9–39)
BASOPHILS # BLD AUTO: 0.03 X10*3/UL (ref 0–0.1)
BASOPHILS NFR BLD AUTO: 0.3 %
BILIRUB SERPL-MCNC: 0.9 MG/DL (ref 0–1.2)
BILIRUB UR STRIP.AUTO-MCNC: NEGATIVE MG/DL
BUN SERPL-MCNC: 15 MG/DL (ref 6–23)
CALCIUM SERPL-MCNC: 9.7 MG/DL (ref 8.6–10.6)
CHLORIDE SERPL-SCNC: 101 MMOL/L (ref 98–107)
CO2 SERPL-SCNC: 27 MMOL/L (ref 21–32)
COLOR UR: YELLOW
CREAT SERPL-MCNC: 1.05 MG/DL (ref 0.5–1.3)
CRP SERPL-MCNC: <0.1 MG/DL
EGFRCR SERPLBLD CKD-EPI 2021: 75 ML/MIN/1.73M*2
EOSINOPHIL # BLD AUTO: 0.08 X10*3/UL (ref 0–0.4)
EOSINOPHIL NFR BLD AUTO: 0.9 %
ERYTHROCYTE [DISTWIDTH] IN BLOOD BY AUTOMATED COUNT: 13 % (ref 11.5–14.5)
GLUCOSE SERPL-MCNC: 104 MG/DL (ref 74–99)
GLUCOSE UR STRIP.AUTO-MCNC: NORMAL MG/DL
HCT VFR BLD AUTO: 42.3 % (ref 41–52)
HGB BLD-MCNC: 14.1 G/DL (ref 13.5–17.5)
IMM GRANULOCYTES # BLD AUTO: 0.03 X10*3/UL (ref 0–0.5)
IMM GRANULOCYTES NFR BLD AUTO: 0.3 % (ref 0–0.9)
INR PPP: 1.1 (ref 0.9–1.1)
KETONES UR STRIP.AUTO-MCNC: NEGATIVE MG/DL
LEUKOCYTE ESTERASE UR QL STRIP.AUTO: NEGATIVE
LYMPHOCYTES # BLD AUTO: 1.38 X10*3/UL (ref 0.8–3)
LYMPHOCYTES NFR BLD AUTO: 16.1 %
MCH RBC QN AUTO: 32.1 PG (ref 26–34)
MCHC RBC AUTO-ENTMCNC: 33.3 G/DL (ref 32–36)
MCV RBC AUTO: 96 FL (ref 80–100)
MONOCYTES # BLD AUTO: 0.84 X10*3/UL (ref 0.05–0.8)
MONOCYTES NFR BLD AUTO: 9.8 %
MUCOUS THREADS #/AREA URNS AUTO: ABNORMAL /LPF
NEUTROPHILS # BLD AUTO: 6.22 X10*3/UL (ref 1.6–5.5)
NEUTROPHILS NFR BLD AUTO: 72.6 %
NITRITE UR QL STRIP.AUTO: NEGATIVE
NRBC BLD-RTO: 0 /100 WBCS (ref 0–0)
PH UR STRIP.AUTO: 5.5 [PH]
PLATELET # BLD AUTO: 196 X10*3/UL (ref 150–450)
POTASSIUM SERPL-SCNC: 4.2 MMOL/L (ref 3.5–5.3)
PROT SERPL-MCNC: 7.3 G/DL (ref 6.4–8.2)
PROT UR STRIP.AUTO-MCNC: ABNORMAL MG/DL
PROTHROMBIN TIME: 12 SECONDS (ref 9.8–12.8)
RBC # BLD AUTO: 4.39 X10*6/UL (ref 4.5–5.9)
RBC # UR STRIP.AUTO: ABNORMAL /UL
RBC #/AREA URNS AUTO: ABNORMAL /HPF
RH FACTOR (ANTIGEN D): NORMAL
SODIUM SERPL-SCNC: 138 MMOL/L (ref 136–145)
SP GR UR STRIP.AUTO: 1.02
UROBILINOGEN UR STRIP.AUTO-MCNC: ABNORMAL MG/DL
WBC # BLD AUTO: 8.6 X10*3/UL (ref 4.4–11.3)
WBC #/AREA URNS AUTO: ABNORMAL /HPF

## 2025-01-08 PROCEDURE — 1126F AMNT PAIN NOTED NONE PRSNT: CPT | Performed by: STUDENT IN AN ORGANIZED HEALTH CARE EDUCATION/TRAINING PROGRAM

## 2025-01-08 PROCEDURE — 71046 X-RAY EXAM CHEST 2 VIEWS: CPT | Performed by: RADIOLOGY

## 2025-01-08 PROCEDURE — 3008F BODY MASS INDEX DOCD: CPT | Performed by: STUDENT IN AN ORGANIZED HEALTH CARE EDUCATION/TRAINING PROGRAM

## 2025-01-08 PROCEDURE — 71046 X-RAY EXAM CHEST 2 VIEWS: CPT

## 2025-01-08 PROCEDURE — 99214 OFFICE O/P EST MOD 30 MIN: CPT | Performed by: STUDENT IN AN ORGANIZED HEALTH CARE EDUCATION/TRAINING PROGRAM

## 2025-01-08 PROCEDURE — 86923 COMPATIBILITY TEST ELECTRIC: CPT

## 2025-01-08 PROCEDURE — 1159F MED LIST DOCD IN RCRD: CPT | Performed by: STUDENT IN AN ORGANIZED HEALTH CARE EDUCATION/TRAINING PROGRAM

## 2025-01-08 RX ORDER — ACETAMINOPHEN 500 MG
2000 TABLET ORAL DAILY
COMMUNITY

## 2025-01-08 RX ORDER — ARIPIPRAZOLE 10 MG/1
10 TABLET ORAL DAILY
COMMUNITY
Start: 2024-12-30

## 2025-01-08 RX ORDER — DIPHENHYDRAMINE HCL 25 MG
50 TABLET ORAL DAILY
COMMUNITY

## 2025-01-08 RX ORDER — PANTOPRAZOLE SODIUM 40 MG/1
1 TABLET, DELAYED RELEASE ORAL
COMMUNITY
Start: 2024-11-27

## 2025-01-08 RX ORDER — DOCUSATE SODIUM 100 MG/1
100 CAPSULE, LIQUID FILLED ORAL 2 TIMES DAILY PRN
COMMUNITY
Start: 2024-12-02

## 2025-01-08 RX ORDER — MIRTAZAPINE 15 MG/1
15 TABLET, FILM COATED ORAL NIGHTLY
COMMUNITY
Start: 2024-12-18

## 2025-01-08 ASSESSMENT — PAIN SCALES - GENERAL: PAINLEVEL_OUTOF10: 0-NO PAIN

## 2025-01-09 LAB — HOLD SPECIMEN: NORMAL

## 2025-01-14 ENCOUNTER — ANESTHESIA EVENT (OUTPATIENT)
Dept: OPERATING ROOM | Facility: HOSPITAL | Age: 75
End: 2025-01-14
Payer: MEDICARE

## 2025-01-16 DIAGNOSIS — Z01.818 PRE-OP EXAM: ICD-10-CM

## 2025-01-20 NOTE — PROGRESS NOTES
"Pharmacy Medication History Review    Parish Valerio \"Bret\" is a 74 y.o. male who is planned to be admitted for Coronary artery disease involving native coronary artery of native heart. Pharmacy called the patient prior to their scheduled procedure and reviewed the patient's [id number] medications for accuracy.    Medications ADDED:  Vitamin K2  Medications CHANGED:  Lamotrigine 200mg directions from #1QAM to #1QD@4pm  Sominex 25mg #2QD to benadryl 25mg #2QAM  Medications REMOVED:   none    Please review updated prior to admission medication list and comments regarding how patient may be taking medications differently by going to Admission tab --> Admission Orders --> Admit Orders / Review prior to admission medications.     Preferred pharmacy, last doses of medications, and allergies to be confirmed with patient by nursing the day of procedure.     Sources used to complete the med history include:  [facility]  Pharmacy dispense history  Patient interview  Spouse  Chart Review  Care Everywhere     Below are additional concerns with the patient's PTA list.  Spoke with patient and spouse - maurilio -  Spouse confirmed patient is taking both lamotrigine 150mg and 200mg daily. Patient takes 200mg at 4:00pm daily. L.F. 01/09/25 #30/30d. Patient takes 150mg at bedtime L.F. 11/19/24 #90/90d    Amie Costello  [facility]  Please reach out via Secure Chat for questions or call [id number] or Vocera Medrec  "

## 2025-01-21 ENCOUNTER — HOSPITAL ENCOUNTER (INPATIENT)
Facility: HOSPITAL | Age: 75
DRG: 235 | End: 2025-01-21
Attending: STUDENT IN AN ORGANIZED HEALTH CARE EDUCATION/TRAINING PROGRAM | Admitting: STUDENT IN AN ORGANIZED HEALTH CARE EDUCATION/TRAINING PROGRAM
Payer: MEDICARE

## 2025-01-21 ENCOUNTER — APPOINTMENT (OUTPATIENT)
Dept: RADIOLOGY | Facility: HOSPITAL | Age: 75
DRG: 235 | End: 2025-01-21
Payer: MEDICARE

## 2025-01-21 ENCOUNTER — HOSPITAL ENCOUNTER (OUTPATIENT)
Dept: OPERATING ROOM | Facility: HOSPITAL | Age: 75
Discharge: HOME | End: 2025-01-21

## 2025-01-21 ENCOUNTER — APPOINTMENT (OUTPATIENT)
Dept: CARDIOLOGY | Facility: HOSPITAL | Age: 75
DRG: 235 | End: 2025-01-21
Payer: MEDICARE

## 2025-01-21 ENCOUNTER — ANESTHESIA (OUTPATIENT)
Dept: OPERATING ROOM | Facility: HOSPITAL | Age: 75
End: 2025-01-21
Payer: MEDICARE

## 2025-01-21 DIAGNOSIS — Z95.1 S/P CABG X 2: ICD-10-CM

## 2025-01-21 DIAGNOSIS — D50.9 IRON DEFICIENCY ANEMIA, UNSPECIFIED IRON DEFICIENCY ANEMIA TYPE: ICD-10-CM

## 2025-01-21 DIAGNOSIS — I25.10 CORONARY ARTERY DISEASE INVOLVING NATIVE CORONARY ARTERY OF NATIVE HEART, UNSPECIFIED WHETHER ANGINA PRESENT: Primary | ICD-10-CM

## 2025-01-21 DIAGNOSIS — D64.9 POSTOPERATIVE ANEMIA: ICD-10-CM

## 2025-01-21 DIAGNOSIS — I25.721 ATHEROSCLEROSIS OF AUTOLOGOUS ARTERY CORONARY ARTERY BYPASS GRAFT(S) WITH ANGINA PECTORIS WITH DOCUMENTED SPASM: ICD-10-CM

## 2025-01-21 PROBLEM — Z79.01 ANTICOAGULANT LONG-TERM USE: Status: ACTIVE | Noted: 2025-01-21

## 2025-01-21 LAB
ABO GROUP (TYPE) IN BLOOD: NORMAL
ACT BLD: 101 SEC (ref 82–174)
ACT BLD: 115 SEC (ref 82–174)
ACT BLD: 280 SEC (ref 82–174)
ACT BLD: 337 SEC (ref 82–174)
ACT BLD: 340 SEC (ref 82–174)
ACT BLD: 391 SEC (ref 82–174)
ALBUMIN SERPL BCP-MCNC: 3.5 G/DL (ref 3.4–5)
ALP SERPL-CCNC: 45 U/L (ref 33–136)
ALT SERPL W P-5'-P-CCNC: 29 U/L (ref 10–52)
ANION GAP BLDA CALCULATED.4IONS-SCNC: 10 MMO/L (ref 10–25)
ANION GAP BLDA CALCULATED.4IONS-SCNC: 11 MMO/L (ref 10–25)
ANION GAP BLDA CALCULATED.4IONS-SCNC: 12 MMO/L (ref 10–25)
ANION GAP BLDA CALCULATED.4IONS-SCNC: 13 MMO/L (ref 10–25)
ANION GAP BLDA CALCULATED.4IONS-SCNC: 14 MMO/L (ref 10–25)
ANION GAP BLDA CALCULATED.4IONS-SCNC: 9 MMO/L (ref 10–25)
ANION GAP SERPL CALC-SCNC: 17 MMOL/L (ref 10–20)
APTT PPP: 26 SECONDS (ref 27–38)
AST SERPL W P-5'-P-CCNC: 29 U/L (ref 9–39)
BASE EXCESS BLDA CALC-SCNC: -0.5 MMOL/L (ref -2–3)
BASE EXCESS BLDA CALC-SCNC: -1.3 MMOL/L (ref -2–3)
BASE EXCESS BLDA CALC-SCNC: -1.3 MMOL/L (ref -2–3)
BASE EXCESS BLDA CALC-SCNC: -2.2 MMOL/L (ref -2–3)
BASE EXCESS BLDA CALC-SCNC: -2.3 MMOL/L (ref -2–3)
BASE EXCESS BLDA CALC-SCNC: -2.6 MMOL/L (ref -2–3)
BASE EXCESS BLDA CALC-SCNC: -2.7 MMOL/L (ref -2–3)
BASE EXCESS BLDA CALC-SCNC: -2.7 MMOL/L (ref -2–3)
BASE EXCESS BLDA CALC-SCNC: -3 MMOL/L (ref -2–3)
BASE EXCESS BLDA CALC-SCNC: -3.2 MMOL/L (ref -2–3)
BASE EXCESS BLDA CALC-SCNC: -3.5 MMOL/L (ref -2–3)
BILIRUB DIRECT SERPL-MCNC: 0.6 MG/DL (ref 0–0.3)
BILIRUB SERPL-MCNC: 1 MG/DL (ref 0–1.2)
BODY TEMPERATURE: 37 DEGREES CELSIUS
BUN SERPL-MCNC: 15 MG/DL (ref 6–23)
CA-I BLD-SCNC: 1.01 MMOL/L (ref 1.1–1.33)
CA-I BLDA-SCNC: 1.01 MMOL/L (ref 1.1–1.33)
CA-I BLDA-SCNC: 1.05 MMOL/L (ref 1.1–1.33)
CA-I BLDA-SCNC: 1.05 MMOL/L (ref 1.1–1.33)
CA-I BLDA-SCNC: 1.07 MMOL/L (ref 1.1–1.33)
CA-I BLDA-SCNC: 1.07 MMOL/L (ref 1.1–1.33)
CA-I BLDA-SCNC: 1.09 MMOL/L (ref 1.1–1.33)
CA-I BLDA-SCNC: 1.1 MMOL/L (ref 1.1–1.33)
CA-I BLDA-SCNC: 1.11 MMOL/L (ref 1.1–1.33)
CA-I BLDA-SCNC: 1.14 MMOL/L (ref 1.1–1.33)
CA-I BLDA-SCNC: 1.18 MMOL/L (ref 1.1–1.33)
CA-I BLDA-SCNC: 1.22 MMOL/L (ref 1.1–1.33)
CALCIUM SERPL-MCNC: 7.7 MG/DL (ref 8.6–10.6)
CFT FORM KAOLIN IND BLD RES TEG: 1.4 MIN (ref 0.8–2.1)
CHLORIDE BLDA-SCNC: 104 MMOL/L (ref 98–107)
CHLORIDE BLDA-SCNC: 105 MMOL/L (ref 98–107)
CHLORIDE BLDA-SCNC: 105 MMOL/L (ref 98–107)
CHLORIDE BLDA-SCNC: 106 MMOL/L (ref 98–107)
CHLORIDE BLDA-SCNC: 107 MMOL/L (ref 98–107)
CHLORIDE SERPL-SCNC: 106 MMOL/L (ref 98–107)
CLOT ANGLE.KAOLIN INDUCED BLD RES TEG: 71 DEG (ref 63–78)
CLOT INIT KAO IND P HEP NEUT BLD RES TEG: 4.7 MIN (ref 4.3–8.3)
CLOT INIT KAO IND P HEP NEUT BLD RES TEG: 5.3 MIN (ref 4.6–9.1)
CO2 SERPL-SCNC: 22 MMOL/L (ref 21–32)
COHGB MFR BLDA: 0.2 %
COHGB MFR BLDA: 0.3 %
COHGB MFR BLDA: 0.4 %
COHGB MFR BLDA: 0.4 %
COHGB MFR BLDA: 0.9 %
CREAT SERPL-MCNC: 0.74 MG/DL (ref 0.5–1.3)
DO-HGB MFR BLDA: 0.8 % (ref 0–5)
DO-HGB MFR BLDA: 1 % (ref 0–5)
DO-HGB MFR BLDA: 1.2 % (ref 0–5)
DO-HGB MFR BLDA: 1.4 % (ref 0–5)
DO-HGB MFR BLDA: 2.7 % (ref 0–5)
EGFRCR SERPLBLD CKD-EPI 2021: >90 ML/MIN/1.73M*2
EJECTION FRACTION: 65 %
ERYTHROCYTE [DISTWIDTH] IN BLOOD BY AUTOMATED COUNT: 13.2 % (ref 11.5–14.5)
ERYTHROCYTE [DISTWIDTH] IN BLOOD BY AUTOMATED COUNT: 13.3 % (ref 11.5–14.5)
FIBRINOGEN BLD CALC-MCNC: 350 MG/DL (ref 278–581)
FIBRINOGEN PPP-MCNC: 155 MG/DL (ref 200–400)
GLUCOSE BLD MANUAL STRIP-MCNC: 112 MG/DL (ref 74–99)
GLUCOSE BLDA-MCNC: 101 MG/DL (ref 74–99)
GLUCOSE BLDA-MCNC: 130 MG/DL (ref 74–99)
GLUCOSE BLDA-MCNC: 131 MG/DL (ref 74–99)
GLUCOSE BLDA-MCNC: 131 MG/DL (ref 74–99)
GLUCOSE BLDA-MCNC: 136 MG/DL (ref 74–99)
GLUCOSE BLDA-MCNC: 142 MG/DL (ref 74–99)
GLUCOSE BLDA-MCNC: 153 MG/DL (ref 74–99)
GLUCOSE BLDA-MCNC: 154 MG/DL (ref 74–99)
GLUCOSE BLDA-MCNC: 92 MG/DL (ref 74–99)
GLUCOSE BLDA-MCNC: 96 MG/DL (ref 74–99)
GLUCOSE BLDA-MCNC: 97 MG/DL (ref 74–99)
GLUCOSE SERPL-MCNC: 134 MG/DL (ref 74–99)
HCO3 BLDA-SCNC: 20.7 MMOL/L (ref 22–26)
HCO3 BLDA-SCNC: 21.8 MMOL/L (ref 22–26)
HCO3 BLDA-SCNC: 22 MMOL/L (ref 22–26)
HCO3 BLDA-SCNC: 22 MMOL/L (ref 22–26)
HCO3 BLDA-SCNC: 22.4 MMOL/L (ref 22–26)
HCO3 BLDA-SCNC: 22.5 MMOL/L (ref 22–26)
HCO3 BLDA-SCNC: 22.6 MMOL/L (ref 22–26)
HCO3 BLDA-SCNC: 22.7 MMOL/L (ref 22–26)
HCO3 BLDA-SCNC: 23.2 MMOL/L (ref 22–26)
HCO3 BLDA-SCNC: 24.7 MMOL/L (ref 22–26)
HCO3 BLDA-SCNC: 26.6 MMOL/L (ref 22–26)
HCT VFR BLD AUTO: 31.2 % (ref 41–52)
HCT VFR BLD AUTO: 31.9 % (ref 41–52)
HCT VFR BLD EST: 32 % (ref 41–52)
HCT VFR BLD EST: 33 % (ref 41–52)
HCT VFR BLD EST: 34 % (ref 41–52)
HCT VFR BLD EST: 40 % (ref 41–52)
HCT VFR BLD EST: 49 % (ref 41–52)
HGB BLD-MCNC: 10.2 G/DL (ref 13.5–17.5)
HGB BLD-MCNC: 10.6 G/DL (ref 13.5–17.5)
HGB BLDA-MCNC: 10.6 G/DL (ref 13.5–17.5)
HGB BLDA-MCNC: 10.7 G/DL (ref 13.5–17.5)
HGB BLDA-MCNC: 10.8 G/DL (ref 13.5–17.5)
HGB BLDA-MCNC: 11 G/DL (ref 13.5–17.5)
HGB BLDA-MCNC: 11.4 G/DL (ref 13.5–17.5)
HGB BLDA-MCNC: 13.3 G/DL (ref 13.5–17.5)
HGB BLDA-MCNC: 13.3 G/DL (ref 13.5–17.5)
HGB BLDA-MCNC: 16.3 G/DL (ref 13.5–17.5)
INHALED O2 CONCENTRATION: 100 %
INHALED O2 CONCENTRATION: 21 %
INHALED O2 CONCENTRATION: 30 %
INHALED O2 CONCENTRATION: 40 %
INHALED O2 CONCENTRATION: 40 %
INHALED O2 CONCENTRATION: 50 %
INHALED O2 CONCENTRATION: 60 %
INR PPP: 1.3 (ref 0.9–1.1)
LACTATE BLDA-SCNC: 0.5 MMOL/L (ref 0.4–2)
LACTATE BLDA-SCNC: 0.5 MMOL/L (ref 0.4–2)
LACTATE BLDA-SCNC: 0.6 MMOL/L (ref 0.4–2)
LACTATE BLDA-SCNC: 0.7 MMOL/L (ref 0.4–2)
LACTATE BLDA-SCNC: 0.8 MMOL/L (ref 0.4–2)
LACTATE BLDA-SCNC: 1.1 MMOL/L (ref 0.4–2)
LACTATE BLDA-SCNC: 1.3 MMOL/L (ref 0.4–2)
LACTATE BLDA-SCNC: 1.6 MMOL/L (ref 0.4–2)
LACTATE BLDA-SCNC: 1.6 MMOL/L (ref 0.4–2)
LACTATE BLDA-SCNC: 1.9 MMOL/L (ref 0.4–2)
LACTATE BLDA-SCNC: 3.4 MMOL/L (ref 0.4–2)
MA KAOLIN BLD RES TEG: 58 MM (ref 52–69)
MA KAOLIN+TF BLD RES TEG: 60 MM (ref 52–70)
MA TF IND+IIB-IIIA INH BLD RES TEG: 19 MM (ref 15–32)
MAGNESIUM SERPL-MCNC: 1.85 MG/DL (ref 1.6–2.4)
MCH RBC QN AUTO: 31.1 PG (ref 26–34)
MCH RBC QN AUTO: 31.9 PG (ref 26–34)
MCHC RBC AUTO-ENTMCNC: 32.7 G/DL (ref 32–36)
MCHC RBC AUTO-ENTMCNC: 33.2 G/DL (ref 32–36)
MCV RBC AUTO: 95 FL (ref 80–100)
MCV RBC AUTO: 96 FL (ref 80–100)
METHGB MFR BLDA: 0.5 % (ref 0–1.5)
METHGB MFR BLDA: 0.6 % (ref 0–1.5)
METHGB MFR BLDA: 0.7 % (ref 0–1.5)
METHGB MFR BLDA: 0.7 % (ref 0–1.5)
METHGB MFR BLDA: 0.9 % (ref 0–1.5)
NRBC BLD-RTO: 0 /100 WBCS (ref 0–0)
NRBC BLD-RTO: 0 /100 WBCS (ref 0–0)
OXYHGB MFR BLDA: 95.9 % (ref 94–98)
OXYHGB MFR BLDA: 95.9 % (ref 94–98)
OXYHGB MFR BLDA: 97 % (ref 94–98)
OXYHGB MFR BLDA: 97.4 % (ref 94–98)
OXYHGB MFR BLDA: 97.9 % (ref 94–98)
OXYHGB MFR BLDA: 98 % (ref 94–98)
OXYHGB MFR BLDA: 98.1 % (ref 94–98)
PCO2 BLDA: 26 MM HG (ref 38–42)
PCO2 BLDA: 35 MM HG (ref 38–42)
PCO2 BLDA: 36 MM HG (ref 38–42)
PCO2 BLDA: 37 MM HG (ref 38–42)
PCO2 BLDA: 38 MM HG (ref 38–42)
PCO2 BLDA: 38 MM HG (ref 38–42)
PCO2 BLDA: 39 MM HG (ref 38–42)
PCO2 BLDA: 40 MM HG (ref 38–42)
PCO2 BLDA: 44 MM HG (ref 38–42)
PCO2 BLDA: 54 MM HG (ref 38–42)
PCO2 BLDA: 59 MM HG (ref 38–42)
PH BLDA: 7.23 PH (ref 7.38–7.42)
PH BLDA: 7.3 PH (ref 7.38–7.42)
PH BLDA: 7.33 PH (ref 7.38–7.42)
PH BLDA: 7.36 PH (ref 7.38–7.42)
PH BLDA: 7.36 PH (ref 7.38–7.42)
PH BLDA: 7.37 PH (ref 7.38–7.42)
PH BLDA: 7.38 PH (ref 7.38–7.42)
PH BLDA: 7.39 PH (ref 7.38–7.42)
PH BLDA: 7.39 PH (ref 7.38–7.42)
PH BLDA: 7.42 PH (ref 7.38–7.42)
PH BLDA: 7.51 PH (ref 7.38–7.42)
PHOSPHATE SERPL-MCNC: 3.1 MG/DL (ref 2.5–4.9)
PLATELET # BLD AUTO: 126 X10*3/UL (ref 150–450)
PLATELET # BLD AUTO: 149 X10*3/UL (ref 150–450)
PO2 BLDA: 129 MM HG (ref 85–95)
PO2 BLDA: 131 MM HG (ref 85–95)
PO2 BLDA: 185 MM HG (ref 85–95)
PO2 BLDA: 203 MM HG (ref 85–95)
PO2 BLDA: 257 MM HG (ref 85–95)
PO2 BLDA: 259 MM HG (ref 85–95)
PO2 BLDA: 278 MM HG (ref 85–95)
PO2 BLDA: 284 MM HG (ref 85–95)
PO2 BLDA: 301 MM HG (ref 85–95)
PO2 BLDA: 477 MM HG (ref 85–95)
PO2 BLDA: 79 MM HG (ref 85–95)
POTASSIUM BLDA-SCNC: 3 MMOL/L (ref 3.5–5.3)
POTASSIUM BLDA-SCNC: 3.2 MMOL/L (ref 3.5–5.3)
POTASSIUM BLDA-SCNC: 3.3 MMOL/L (ref 3.5–5.3)
POTASSIUM BLDA-SCNC: 3.4 MMOL/L (ref 3.5–5.3)
POTASSIUM BLDA-SCNC: 3.4 MMOL/L (ref 3.5–5.3)
POTASSIUM BLDA-SCNC: 3.7 MMOL/L (ref 3.5–5.3)
POTASSIUM BLDA-SCNC: 3.8 MMOL/L (ref 3.5–5.3)
POTASSIUM BLDA-SCNC: 3.9 MMOL/L (ref 3.5–5.3)
POTASSIUM BLDA-SCNC: 4 MMOL/L (ref 3.5–5.3)
POTASSIUM SERPL-SCNC: 3.5 MMOL/L (ref 3.5–5.3)
PROT SERPL-MCNC: 5.2 G/DL (ref 6.4–8.2)
PROTHROMBIN TIME: 14.1 SECONDS (ref 9.8–12.8)
RBC # BLD AUTO: 3.28 X10*6/UL (ref 4.5–5.9)
RBC # BLD AUTO: 3.32 X10*6/UL (ref 4.5–5.9)
RH FACTOR (ANTIGEN D): NORMAL
SAO2 % BLDA: 100 % (ref 94–100)
SAO2 % BLDA: 97 % (ref 94–100)
SAO2 % BLDA: 99 % (ref 94–100)
SODIUM BLDA-SCNC: 135 MMOL/L (ref 136–145)
SODIUM BLDA-SCNC: 135 MMOL/L (ref 136–145)
SODIUM BLDA-SCNC: 136 MMOL/L (ref 136–145)
SODIUM BLDA-SCNC: 136 MMOL/L (ref 136–145)
SODIUM BLDA-SCNC: 137 MMOL/L (ref 136–145)
SODIUM BLDA-SCNC: 138 MMOL/L (ref 136–145)
SODIUM BLDA-SCNC: 138 MMOL/L (ref 136–145)
SODIUM SERPL-SCNC: 141 MMOL/L (ref 136–145)
TEST COMMENT: NORMAL
WBC # BLD AUTO: 16.5 X10*3/UL (ref 4.4–11.3)
WBC # BLD AUTO: 17.3 X10*3/UL (ref 4.4–11.3)

## 2025-01-21 PROCEDURE — 80076 HEPATIC FUNCTION PANEL: CPT

## 2025-01-21 PROCEDURE — 74018 RADEX ABDOMEN 1 VIEW: CPT | Performed by: RADIOLOGY

## 2025-01-21 PROCEDURE — 85384 FIBRINOGEN ACTIVITY: CPT

## 2025-01-21 PROCEDURE — 36620 INSERTION CATHETER ARTERY: CPT | Performed by: STUDENT IN AN ORGANIZED HEALTH CARE EDUCATION/TRAINING PROGRAM

## 2025-01-21 PROCEDURE — 2500000004 HC RX 250 GENERAL PHARMACY W/ HCPCS (ALT 636 FOR OP/ED): Performed by: NURSE PRACTITIONER

## 2025-01-21 PROCEDURE — 36415 COLL VENOUS BLD VENIPUNCTURE: CPT | Performed by: ANESTHESIOLOGY

## 2025-01-21 PROCEDURE — 2500000005 HC RX 250 GENERAL PHARMACY W/O HCPCS: Performed by: STUDENT IN AN ORGANIZED HEALTH CARE EDUCATION/TRAINING PROGRAM

## 2025-01-21 PROCEDURE — 2500000001 HC RX 250 WO HCPCS SELF ADMINISTERED DRUGS (ALT 637 FOR MEDICARE OP)

## 2025-01-21 PROCEDURE — 71045 X-RAY EXAM CHEST 1 VIEW: CPT

## 2025-01-21 PROCEDURE — 99291 CRITICAL CARE FIRST HOUR: CPT | Performed by: ANESTHESIOLOGY

## 2025-01-21 PROCEDURE — 2020000001 HC ICU ROOM DAILY

## 2025-01-21 PROCEDURE — 2500000004 HC RX 250 GENERAL PHARMACY W/ HCPCS (ALT 636 FOR OP/ED): Performed by: STUDENT IN AN ORGANIZED HEALTH CARE EDUCATION/TRAINING PROGRAM

## 2025-01-21 PROCEDURE — 36556 INSERT NON-TUNNEL CV CATH: CPT | Performed by: STUDENT IN AN ORGANIZED HEALTH CARE EDUCATION/TRAINING PROGRAM

## 2025-01-21 PROCEDURE — 8E0W4CZ ROBOTIC ASSISTED PROCEDURE OF TRUNK REGION, PERCUTANEOUS ENDOSCOPIC APPROACH: ICD-10-PCS | Performed by: STUDENT IN AN ORGANIZED HEALTH CARE EDUCATION/TRAINING PROGRAM

## 2025-01-21 PROCEDURE — 02100Z8 BYPASS CORONARY ARTERY, ONE ARTERY FROM RIGHT INTERNAL MAMMARY, OPEN APPROACH: ICD-10-PCS | Performed by: STUDENT IN AN ORGANIZED HEALTH CARE EDUCATION/TRAINING PROGRAM

## 2025-01-21 PROCEDURE — 82435 ASSAY OF BLOOD CHLORIDE: CPT

## 2025-01-21 PROCEDURE — 80053 COMPREHEN METABOLIC PANEL: CPT

## 2025-01-21 PROCEDURE — 94002 VENT MGMT INPAT INIT DAY: CPT

## 2025-01-21 PROCEDURE — 37799 UNLISTED PX VASCULAR SURGERY: CPT

## 2025-01-21 PROCEDURE — 93010 ELECTROCARDIOGRAM REPORT: CPT | Performed by: INTERNAL MEDICINE

## 2025-01-21 PROCEDURE — 2500000005 HC RX 250 GENERAL PHARMACY W/O HCPCS

## 2025-01-21 PROCEDURE — 33534 CABG ARTERIAL TWO: CPT | Performed by: STUDENT IN AN ORGANIZED HEALTH CARE EDUCATION/TRAINING PROGRAM

## 2025-01-21 PROCEDURE — 33534 CABG ARTERIAL TWO: CPT | Performed by: THORACIC SURGERY (CARDIOTHORACIC VASCULAR SURGERY)

## 2025-01-21 PROCEDURE — 3700000002 HC GENERAL ANESTHESIA TIME - EACH INCREMENTAL 1 MINUTE: Performed by: STUDENT IN AN ORGANIZED HEALTH CARE EDUCATION/TRAINING PROGRAM

## 2025-01-21 PROCEDURE — 85610 PROTHROMBIN TIME: CPT

## 2025-01-21 PROCEDURE — 82947 ASSAY GLUCOSE BLOOD QUANT: CPT

## 2025-01-21 PROCEDURE — 85347 COAGULATION TIME ACTIVATED: CPT

## 2025-01-21 PROCEDURE — 3600000017 HC OR TIME - EACH INCREMENTAL 1 MINUTE - PROCEDURE LEVEL SIX: Performed by: STUDENT IN AN ORGANIZED HEALTH CARE EDUCATION/TRAINING PROGRAM

## 2025-01-21 PROCEDURE — 02100Z9 BYPASS CORONARY ARTERY, ONE ARTERY FROM LEFT INTERNAL MAMMARY, OPEN APPROACH: ICD-10-PCS | Performed by: STUDENT IN AN ORGANIZED HEALTH CARE EDUCATION/TRAINING PROGRAM

## 2025-01-21 PROCEDURE — 2500000004 HC RX 250 GENERAL PHARMACY W/ HCPCS (ALT 636 FOR OP/ED): Mod: JZ,TB

## 2025-01-21 PROCEDURE — 99223 1ST HOSP IP/OBS HIGH 75: CPT

## 2025-01-21 PROCEDURE — 76937 US GUIDE VASCULAR ACCESS: CPT | Performed by: STUDENT IN AN ORGANIZED HEALTH CARE EDUCATION/TRAINING PROGRAM

## 2025-01-21 PROCEDURE — 84100 ASSAY OF PHOSPHORUS: CPT

## 2025-01-21 PROCEDURE — 3600000018 HC OR TIME - INITIAL BASE CHARGE - PROCEDURE LEVEL SIX: Performed by: STUDENT IN AN ORGANIZED HEALTH CARE EDUCATION/TRAINING PROGRAM

## 2025-01-21 PROCEDURE — 84520 ASSAY OF UREA NITROGEN: CPT

## 2025-01-21 PROCEDURE — 82375 ASSAY CARBOXYHB QUANT: CPT

## 2025-01-21 PROCEDURE — 2720000007 HC OR 272 NO HCPCS: Performed by: STUDENT IN AN ORGANIZED HEALTH CARE EDUCATION/TRAINING PROGRAM

## 2025-01-21 PROCEDURE — 71045 X-RAY EXAM CHEST 1 VIEW: CPT | Performed by: RADIOLOGY

## 2025-01-21 PROCEDURE — 82330 ASSAY OF CALCIUM: CPT

## 2025-01-21 PROCEDURE — 83735 ASSAY OF MAGNESIUM: CPT

## 2025-01-21 PROCEDURE — P9045 ALBUMIN (HUMAN), 5%, 250 ML: HCPCS | Mod: JZ,TB

## 2025-01-21 PROCEDURE — 2500000005 HC RX 250 GENERAL PHARMACY W/O HCPCS: Performed by: PHARMACIST

## 2025-01-21 PROCEDURE — 87081 CULTURE SCREEN ONLY: CPT

## 2025-01-21 PROCEDURE — 93005 ELECTROCARDIOGRAM TRACING: CPT

## 2025-01-21 PROCEDURE — 2500000004 HC RX 250 GENERAL PHARMACY W/ HCPCS (ALT 636 FOR OP/ED): Mod: JZ,TB | Performed by: STUDENT IN AN ORGANIZED HEALTH CARE EDUCATION/TRAINING PROGRAM

## 2025-01-21 PROCEDURE — 2500000004 HC RX 250 GENERAL PHARMACY W/ HCPCS (ALT 636 FOR OP/ED): Performed by: PHARMACIST

## 2025-01-21 PROCEDURE — 85027 COMPLETE CBC AUTOMATED: CPT

## 2025-01-21 PROCEDURE — 74018 RADEX ABDOMEN 1 VIEW: CPT

## 2025-01-21 PROCEDURE — C1900 LEAD, CORONARY VENOUS: HCPCS | Performed by: STUDENT IN AN ORGANIZED HEALTH CARE EDUCATION/TRAINING PROGRAM

## 2025-01-21 PROCEDURE — P9045 ALBUMIN (HUMAN), 5%, 250 ML: HCPCS | Mod: JZ,TB | Performed by: STUDENT IN AN ORGANIZED HEALTH CARE EDUCATION/TRAINING PROGRAM

## 2025-01-21 PROCEDURE — 3700000001 HC GENERAL ANESTHESIA TIME - INITIAL BASE CHARGE: Performed by: STUDENT IN AN ORGANIZED HEALTH CARE EDUCATION/TRAINING PROGRAM

## 2025-01-21 PROCEDURE — A4312 CATH W/O BAG 2-WAY SILICONE: HCPCS | Performed by: STUDENT IN AN ORGANIZED HEALTH CARE EDUCATION/TRAINING PROGRAM

## 2025-01-21 RX ORDER — ACETAMINOPHEN 325 MG/1
650 TABLET ORAL EVERY 6 HOURS
Status: DISCONTINUED | OUTPATIENT
Start: 2025-01-21 | End: 2025-01-22

## 2025-01-21 RX ORDER — NAPROXEN SODIUM 220 MG/1
81 TABLET, FILM COATED ORAL DAILY
Status: DISCONTINUED | OUTPATIENT
Start: 2025-01-21 | End: 2025-01-21

## 2025-01-21 RX ORDER — PROPOFOL 10 MG/ML
0-20 INJECTION, EMULSION INTRAVENOUS CONTINUOUS
Status: DISCONTINUED | OUTPATIENT
Start: 2025-01-21 | End: 2025-01-22

## 2025-01-21 RX ORDER — HYDROMORPHONE HYDROCHLORIDE 0.2 MG/ML
0.2 INJECTION INTRAMUSCULAR; INTRAVENOUS; SUBCUTANEOUS
Status: DISCONTINUED | OUTPATIENT
Start: 2025-01-21 | End: 2025-01-22

## 2025-01-21 RX ORDER — CALCIUM GLUCONATE 20 MG/ML
1 INJECTION, SOLUTION INTRAVENOUS EVERY 6 HOURS PRN
Status: DISCONTINUED | OUTPATIENT
Start: 2025-01-21 | End: 2025-01-26

## 2025-01-21 RX ORDER — NALOXONE HYDROCHLORIDE 0.4 MG/ML
0.2 INJECTION, SOLUTION INTRAMUSCULAR; INTRAVENOUS; SUBCUTANEOUS EVERY 5 MIN PRN
Status: DISCONTINUED | OUTPATIENT
Start: 2025-01-21 | End: 2025-02-04 | Stop reason: HOSPADM

## 2025-01-21 RX ORDER — SODIUM CHLORIDE, SODIUM LACTATE, POTASSIUM CHLORIDE, CALCIUM CHLORIDE 600; 310; 30; 20 MG/100ML; MG/100ML; MG/100ML; MG/100ML
30 INJECTION, SOLUTION INTRAVENOUS CONTINUOUS
Status: DISCONTINUED | OUTPATIENT
Start: 2025-01-21 | End: 2025-01-22

## 2025-01-21 RX ORDER — MAGNESIUM SULFATE HEPTAHYDRATE 40 MG/ML
4 INJECTION, SOLUTION INTRAVENOUS EVERY 6 HOURS PRN
Status: DISCONTINUED | OUTPATIENT
Start: 2025-01-21 | End: 2025-01-26

## 2025-01-21 RX ORDER — HYDRALAZINE HYDROCHLORIDE 20 MG/ML
10 INJECTION INTRAMUSCULAR; INTRAVENOUS EVERY 4 HOURS PRN
Status: DISCONTINUED | OUTPATIENT
Start: 2025-01-21 | End: 2025-01-21

## 2025-01-21 RX ORDER — POTASSIUM CHLORIDE 29.8 MG/ML
40 INJECTION INTRAVENOUS EVERY 6 HOURS PRN
Status: DISCONTINUED | OUTPATIENT
Start: 2025-01-21 | End: 2025-01-26

## 2025-01-21 RX ORDER — ASPIRIN 300 MG/1
150 SUPPOSITORY RECTAL DAILY
Status: DISCONTINUED | OUTPATIENT
Start: 2025-01-21 | End: 2025-01-21

## 2025-01-21 RX ORDER — OXYCODONE HYDROCHLORIDE 10 MG/1
10 TABLET ORAL EVERY 4 HOURS PRN
Status: DISCONTINUED | OUTPATIENT
Start: 2025-01-21 | End: 2025-01-25

## 2025-01-21 RX ORDER — ACETAMINOPHEN 10 MG/ML
1000 INJECTION, SOLUTION INTRAVENOUS EVERY 6 HOURS SCHEDULED
Status: DISCONTINUED | OUTPATIENT
Start: 2025-01-21 | End: 2025-01-22

## 2025-01-21 RX ORDER — CEFAZOLIN 1 G/1
INJECTION, POWDER, FOR SOLUTION INTRAVENOUS AS NEEDED
Status: DISCONTINUED | OUTPATIENT
Start: 2025-01-21 | End: 2025-01-21

## 2025-01-21 RX ORDER — HYDRALAZINE HYDROCHLORIDE 20 MG/ML
5 INJECTION INTRAMUSCULAR; INTRAVENOUS EVERY 4 HOURS PRN
Status: DISCONTINUED | OUTPATIENT
Start: 2025-01-21 | End: 2025-01-21

## 2025-01-21 RX ORDER — VANCOMYCIN HYDROCHLORIDE 1 G/20ML
INJECTION, POWDER, LYOPHILIZED, FOR SOLUTION INTRAVENOUS DAILY PRN
Status: DISCONTINUED | OUTPATIENT
Start: 2025-01-21 | End: 2025-01-21 | Stop reason: ALTCHOICE

## 2025-01-21 RX ORDER — SUCCINYLCHOLINE CHLORIDE 100 MG/5ML
SYRINGE (ML) INTRAVENOUS AS NEEDED
Status: DISCONTINUED | OUTPATIENT
Start: 2025-01-21 | End: 2025-01-21

## 2025-01-21 RX ORDER — NOREPINEPHRINE BITARTRATE 0.03 MG/ML
INJECTION, SOLUTION INTRAVENOUS CONTINUOUS PRN
Status: DISCONTINUED | OUTPATIENT
Start: 2025-01-21 | End: 2025-01-21

## 2025-01-21 RX ORDER — ROPIVACAINE IN 0.9% SOD CHL/PF 0.2 %
PLASTIC BAG, INJECTION (ML) EPIDURAL CONTINUOUS PRN
Status: DISCONTINUED | OUTPATIENT
Start: 2025-01-21 | End: 2025-01-21

## 2025-01-21 RX ORDER — LIDOCAINE HYDROCHLORIDE 20 MG/ML
INJECTION, SOLUTION INFILTRATION; PERINEURAL AS NEEDED
Status: DISCONTINUED | OUTPATIENT
Start: 2025-01-21 | End: 2025-01-21

## 2025-01-21 RX ORDER — PROPOFOL 10 MG/ML
INJECTION, EMULSION INTRAVENOUS AS NEEDED
Status: DISCONTINUED | OUTPATIENT
Start: 2025-01-21 | End: 2025-01-21

## 2025-01-21 RX ORDER — ASPIRIN 300 MG/1
150 SUPPOSITORY RECTAL ONCE
Status: DISCONTINUED | OUTPATIENT
Start: 2025-01-21 | End: 2025-01-21 | Stop reason: SDUPTHER

## 2025-01-21 RX ORDER — ROCURONIUM BROMIDE 10 MG/ML
INJECTION, SOLUTION INTRAVENOUS AS NEEDED
Status: DISCONTINUED | OUTPATIENT
Start: 2025-01-21 | End: 2025-01-21

## 2025-01-21 RX ORDER — POTASSIUM CHLORIDE 20 MEQ/1
20 TABLET, EXTENDED RELEASE ORAL EVERY 6 HOURS PRN
Status: DISCONTINUED | OUTPATIENT
Start: 2025-01-21 | End: 2025-01-26

## 2025-01-21 RX ORDER — OXYCODONE HYDROCHLORIDE 5 MG/1
5 TABLET ORAL EVERY 4 HOURS PRN
Status: DISCONTINUED | OUTPATIENT
Start: 2025-01-21 | End: 2025-01-25

## 2025-01-21 RX ORDER — SODIUM CHLORIDE, SODIUM GLUCONATE, SODIUM ACETATE, POTASSIUM CHLORIDE AND MAGNESIUM CHLORIDE 30; 37; 368; 526; 502 MG/100ML; MG/100ML; MG/100ML; MG/100ML; MG/100ML
INJECTION, SOLUTION INTRAVENOUS CONTINUOUS PRN
Status: DISCONTINUED | OUTPATIENT
Start: 2025-01-21 | End: 2025-01-21

## 2025-01-21 RX ORDER — PANTOPRAZOLE SODIUM 40 MG/10ML
40 INJECTION, POWDER, LYOPHILIZED, FOR SOLUTION INTRAVENOUS
Status: DISCONTINUED | OUTPATIENT
Start: 2025-01-22 | End: 2025-01-22

## 2025-01-21 RX ORDER — ONDANSETRON 4 MG/1
4 TABLET, FILM COATED ORAL EVERY 8 HOURS PRN
Status: DISCONTINUED | OUTPATIENT
Start: 2025-01-21 | End: 2025-02-04 | Stop reason: HOSPADM

## 2025-01-21 RX ORDER — HYDRALAZINE HYDROCHLORIDE 20 MG/ML
5 INJECTION INTRAMUSCULAR; INTRAVENOUS ONCE
Status: COMPLETED | OUTPATIENT
Start: 2025-01-21 | End: 2025-01-21

## 2025-01-21 RX ORDER — ATORVASTATIN CALCIUM 80 MG/1
80 TABLET, FILM COATED ORAL NIGHTLY
Status: DISCONTINUED | OUTPATIENT
Start: 2025-01-22 | End: 2025-02-04 | Stop reason: HOSPADM

## 2025-01-21 RX ORDER — DEXMEDETOMIDINE HYDROCHLORIDE 4 UG/ML
0-1.5 INJECTION, SOLUTION INTRAVENOUS CONTINUOUS
Status: DISCONTINUED | OUTPATIENT
Start: 2025-01-21 | End: 2025-01-22

## 2025-01-21 RX ORDER — AMOXICILLIN 250 MG
2 CAPSULE ORAL 2 TIMES DAILY
Status: DISCONTINUED | OUTPATIENT
Start: 2025-01-21 | End: 2025-01-25

## 2025-01-21 RX ORDER — PROTAMINE SULFATE 10 MG/ML
INJECTION, SOLUTION INTRAVENOUS AS NEEDED
Status: DISCONTINUED | OUTPATIENT
Start: 2025-01-21 | End: 2025-01-21

## 2025-01-21 RX ORDER — POLYETHYLENE GLYCOL 3350 17 G/17G
17 POWDER, FOR SOLUTION ORAL 2 TIMES DAILY
Status: DISCONTINUED | OUTPATIENT
Start: 2025-01-21 | End: 2025-01-25

## 2025-01-21 RX ORDER — PAPAVERINE HYDROCHLORIDE 30 MG/ML
INJECTION INTRAMUSCULAR; INTRAVENOUS AS NEEDED
Status: DISCONTINUED | OUTPATIENT
Start: 2025-01-21 | End: 2025-01-21 | Stop reason: HOSPADM

## 2025-01-21 RX ORDER — ALBUMIN HUMAN 50 G/1000ML
SOLUTION INTRAVENOUS AS NEEDED
Status: DISCONTINUED | OUTPATIENT
Start: 2025-01-21 | End: 2025-01-21

## 2025-01-21 RX ORDER — DEXTROSE 50 % IN WATER (D50W) INTRAVENOUS SYRINGE
25
Status: DISCONTINUED | OUTPATIENT
Start: 2025-01-21 | End: 2025-02-04 | Stop reason: HOSPADM

## 2025-01-21 RX ORDER — ONDANSETRON HYDROCHLORIDE 2 MG/ML
4 INJECTION, SOLUTION INTRAVENOUS EVERY 8 HOURS PRN
Status: DISCONTINUED | OUTPATIENT
Start: 2025-01-21 | End: 2025-02-04 | Stop reason: HOSPADM

## 2025-01-21 RX ORDER — CEFAZOLIN SODIUM 2 G/100ML
2 INJECTION, SOLUTION INTRAVENOUS EVERY 8 HOURS
Status: COMPLETED | OUTPATIENT
Start: 2025-01-21 | End: 2025-01-23

## 2025-01-21 RX ORDER — POTASSIUM CHLORIDE 1.5 G/1.58G
20 POWDER, FOR SOLUTION ORAL EVERY 6 HOURS PRN
Status: DISCONTINUED | OUTPATIENT
Start: 2025-01-21 | End: 2025-01-26

## 2025-01-21 RX ORDER — POTASSIUM CHLORIDE 20 MEQ/1
40 TABLET, EXTENDED RELEASE ORAL EVERY 6 HOURS PRN
Status: DISCONTINUED | OUTPATIENT
Start: 2025-01-21 | End: 2025-01-26

## 2025-01-21 RX ORDER — INSULIN LISPRO 100 [IU]/ML
0-15 INJECTION, SOLUTION INTRAVENOUS; SUBCUTANEOUS EVERY 4 HOURS
Status: DISCONTINUED | OUTPATIENT
Start: 2025-01-21 | End: 2025-01-22

## 2025-01-21 RX ORDER — NITROGLYCERIN 40 MG/100ML
INJECTION INTRAVENOUS AS NEEDED
Status: DISCONTINUED | OUTPATIENT
Start: 2025-01-21 | End: 2025-01-21

## 2025-01-21 RX ORDER — FENTANYL CITRATE 50 UG/ML
INJECTION, SOLUTION INTRAMUSCULAR; INTRAVENOUS AS NEEDED
Status: DISCONTINUED | OUTPATIENT
Start: 2025-01-21 | End: 2025-01-21

## 2025-01-21 RX ORDER — CALCIUM GLUCONATE 20 MG/ML
2 INJECTION, SOLUTION INTRAVENOUS EVERY 6 HOURS PRN
Status: DISCONTINUED | OUTPATIENT
Start: 2025-01-21 | End: 2025-01-26

## 2025-01-21 RX ORDER — ROPIVACAINE IN 0.9% SOD CHL/PF 0.2 %
10 PLASTIC BAG, INJECTION (ML) EPIDURAL CONTINUOUS
Status: DISCONTINUED | OUTPATIENT
Start: 2025-01-21 | End: 2025-01-24

## 2025-01-21 RX ORDER — POTASSIUM CHLORIDE 1.5 G/1.58G
40 POWDER, FOR SOLUTION ORAL EVERY 6 HOURS PRN
Status: DISCONTINUED | OUTPATIENT
Start: 2025-01-21 | End: 2025-01-26

## 2025-01-21 RX ORDER — HEPARIN SODIUM 1000 [USP'U]/ML
INJECTION, SOLUTION INTRAVENOUS; SUBCUTANEOUS AS NEEDED
Status: DISCONTINUED | OUTPATIENT
Start: 2025-01-21 | End: 2025-01-21

## 2025-01-21 RX ORDER — PROPOFOL 10 MG/ML
0-50 INJECTION, EMULSION INTRAVENOUS CONTINUOUS
Status: DISCONTINUED | OUTPATIENT
Start: 2025-01-21 | End: 2025-01-21

## 2025-01-21 RX ORDER — NOREPINEPHRINE BITARTRATE/D5W 8 MG/250ML
0-.5 PLASTIC BAG, INJECTION (ML) INTRAVENOUS CONTINUOUS
Status: DISCONTINUED | OUTPATIENT
Start: 2025-01-21 | End: 2025-01-22

## 2025-01-21 RX ORDER — EPINEPHRINE IN 0.9 % SOD CHLOR 4MG/250ML
0-1 PLASTIC BAG, INJECTION (ML) INTRAVENOUS CONTINUOUS
Status: DISCONTINUED | OUTPATIENT
Start: 2025-01-21 | End: 2025-01-22

## 2025-01-21 RX ORDER — HYDRALAZINE HYDROCHLORIDE 20 MG/ML
5 INJECTION INTRAMUSCULAR; INTRAVENOUS EVERY 4 HOURS PRN
Status: DISCONTINUED | OUTPATIENT
Start: 2025-01-21 | End: 2025-01-22

## 2025-01-21 RX ORDER — PANTOPRAZOLE SODIUM 40 MG/1
40 TABLET, DELAYED RELEASE ORAL
Status: DISCONTINUED | OUTPATIENT
Start: 2025-01-22 | End: 2025-02-04 | Stop reason: HOSPADM

## 2025-01-21 RX ORDER — MAGNESIUM SULFATE HEPTAHYDRATE 40 MG/ML
2 INJECTION, SOLUTION INTRAVENOUS EVERY 6 HOURS PRN
Status: DISCONTINUED | OUTPATIENT
Start: 2025-01-21 | End: 2025-01-26

## 2025-01-21 RX ORDER — EPINEPHRINE IN 0.9 % SOD CHLOR 4MG/250ML
PLASTIC BAG, INJECTION (ML) INTRAVENOUS CONTINUOUS PRN
Status: DISCONTINUED | OUTPATIENT
Start: 2025-01-21 | End: 2025-01-21

## 2025-01-21 RX ORDER — POTASSIUM CHLORIDE 14.9 MG/ML
20 INJECTION INTRAVENOUS EVERY 6 HOURS PRN
Status: DISCONTINUED | OUTPATIENT
Start: 2025-01-21 | End: 2025-01-26

## 2025-01-21 RX ORDER — VANCOMYCIN HYDROCHLORIDE 1 G/20ML
INJECTION, POWDER, LYOPHILIZED, FOR SOLUTION INTRAVENOUS DAILY PRN
Status: DISCONTINUED | OUTPATIENT
Start: 2025-01-21 | End: 2025-01-23

## 2025-01-21 RX ORDER — MIDAZOLAM HYDROCHLORIDE 1 MG/ML
INJECTION INTRAMUSCULAR; INTRAVENOUS CONTINUOUS PRN
Status: DISCONTINUED | OUTPATIENT
Start: 2025-01-21 | End: 2025-01-21

## 2025-01-21 RX ORDER — NAPROXEN SODIUM 220 MG/1
81 TABLET, FILM COATED ORAL DAILY
Status: DISCONTINUED | OUTPATIENT
Start: 2025-01-22 | End: 2025-02-04 | Stop reason: HOSPADM

## 2025-01-21 RX ORDER — SODIUM CHLORIDE, SODIUM LACTATE, POTASSIUM CHLORIDE, CALCIUM CHLORIDE 600; 310; 30; 20 MG/100ML; MG/100ML; MG/100ML; MG/100ML
5 INJECTION, SOLUTION INTRAVENOUS CONTINUOUS
Status: DISCONTINUED | OUTPATIENT
Start: 2025-01-21 | End: 2025-01-22

## 2025-01-21 RX ORDER — MAGNESIUM SULFATE HEPTAHYDRATE 40 MG/ML
2 INJECTION, SOLUTION INTRAVENOUS ONCE
Status: COMPLETED | OUTPATIENT
Start: 2025-01-21 | End: 2025-01-21

## 2025-01-21 RX ORDER — ALBUMIN HUMAN 50 G/1000ML
12.5 SOLUTION INTRAVENOUS ONCE
Status: COMPLETED | OUTPATIENT
Start: 2025-01-21 | End: 2025-01-21

## 2025-01-21 RX ADMIN — NOREPINEPHRINE BITARTRATE 8 MCG: 1 INJECTION, SOLUTION, CONCENTRATE INTRAVENOUS at 09:36

## 2025-01-21 RX ADMIN — HYDROMORPHONE HYDROCHLORIDE 0.2 MG: 0.2 INJECTION, SOLUTION INTRAMUSCULAR; INTRAVENOUS; SUBCUTANEOUS at 15:34

## 2025-01-21 RX ADMIN — NOREPINEPHRINE BITARTRATE 32 MCG: 1 INJECTION, SOLUTION, CONCENTRATE INTRAVENOUS at 07:40

## 2025-01-21 RX ADMIN — ROCURONIUM BROMIDE 50 MG: 10 INJECTION INTRAVENOUS at 11:36

## 2025-01-21 RX ADMIN — HEPARIN SODIUM 15000 UNITS: 1000 INJECTION INTRAVENOUS; SUBCUTANEOUS at 11:33

## 2025-01-21 RX ADMIN — FENTANYL CITRATE 100 MCG: 50 INJECTION, SOLUTION INTRAMUSCULAR; INTRAVENOUS at 13:53

## 2025-01-21 RX ADMIN — FENTANYL CITRATE 500 MCG: 50 INJECTION, SOLUTION INTRAMUSCULAR; INTRAVENOUS at 07:40

## 2025-01-21 RX ADMIN — SODIUM CHLORIDE, SODIUM GLUCONATE, SODIUM ACETATE, POTASSIUM CHLORIDE AND MAGNESIUM CHLORIDE: 30; 37; 368; 526; 502 INJECTION, SOLUTION INTRAVENOUS at 07:16

## 2025-01-21 RX ADMIN — CEFAZOLIN SODIUM 2 G: 2 INJECTION, SOLUTION INTRAVENOUS at 20:48

## 2025-01-21 RX ADMIN — CEFAZOLIN 2 G: 1 INJECTION, POWDER, FOR SOLUTION INTRAMUSCULAR; INTRAVENOUS at 12:20

## 2025-01-21 RX ADMIN — PROTAMINE SULFATE 100 MG: 10 INJECTION, SOLUTION INTRAVENOUS at 13:34

## 2025-01-21 RX ADMIN — ROCURONIUM BROMIDE 30 MG: 10 INJECTION INTRAVENOUS at 09:46

## 2025-01-21 RX ADMIN — NOREPINEPHRINE BITARTRATE 8 MCG: 1 INJECTION, SOLUTION, CONCENTRATE INTRAVENOUS at 09:49

## 2025-01-21 RX ADMIN — FENTANYL CITRATE 100 MCG: 50 INJECTION, SOLUTION INTRAMUSCULAR; INTRAVENOUS at 10:59

## 2025-01-21 RX ADMIN — Medication 10 ML/HR: at 09:28

## 2025-01-21 RX ADMIN — ROCURONIUM BROMIDE 20 MG: 10 INJECTION INTRAVENOUS at 13:14

## 2025-01-21 RX ADMIN — SODIUM CHLORIDE, POTASSIUM CHLORIDE, SODIUM LACTATE AND CALCIUM CHLORIDE 500 ML: 600; 310; 30; 20 INJECTION, SOLUTION INTRAVENOUS at 18:22

## 2025-01-21 RX ADMIN — MAGNESIUM SULFATE HEPTAHYDRATE 2 G: 40 INJECTION, SOLUTION INTRAVENOUS at 18:34

## 2025-01-21 RX ADMIN — VANCOMYCIN HYDROCHLORIDE 1500 MG: 5 INJECTION, POWDER, LYOPHILIZED, FOR SOLUTION INTRAVENOUS at 22:58

## 2025-01-21 RX ADMIN — HYDRALAZINE HYDROCHLORIDE 5 MG: 20 INJECTION INTRAMUSCULAR; INTRAVENOUS at 17:29

## 2025-01-21 RX ADMIN — ROCURONIUM BROMIDE 30 MG: 10 INJECTION INTRAVENOUS at 12:20

## 2025-01-21 RX ADMIN — ASPIRIN 150 MG: 300 SUPPOSITORY RECTAL at 14:50

## 2025-01-21 RX ADMIN — CEFAZOLIN 2 G: 1 INJECTION, POWDER, FOR SOLUTION INTRAMUSCULAR; INTRAVENOUS at 08:37

## 2025-01-21 RX ADMIN — ALBUMIN HUMAN 250 ML: 0.05 INJECTION, SOLUTION INTRAVENOUS at 09:07

## 2025-01-21 RX ADMIN — ALBUMIN HUMAN 250 ML: 0.05 INJECTION, SOLUTION INTRAVENOUS at 09:16

## 2025-01-21 RX ADMIN — ALBUMIN HUMAN 12.5 G: 0.05 INJECTION, SOLUTION INTRAVENOUS at 22:58

## 2025-01-21 RX ADMIN — Medication 0.02 MCG/KG/MIN: at 09:03

## 2025-01-21 RX ADMIN — NOREPINEPHRINE BITARTRATE 16 MCG: 1 INJECTION, SOLUTION, CONCENTRATE INTRAVENOUS at 08:53

## 2025-01-21 RX ADMIN — PROPOFOL 200 MG: 10 INJECTION, EMULSION INTRAVENOUS at 07:40

## 2025-01-21 RX ADMIN — NOREPINEPHRINE BITARTRATE 4 MCG: 1 INJECTION, SOLUTION, CONCENTRATE INTRAVENOUS at 09:03

## 2025-01-21 RX ADMIN — SODIUM CHLORIDE, SODIUM GLUCONATE, SODIUM ACETATE, POTASSIUM CHLORIDE AND MAGNESIUM CHLORIDE: 526; 502; 368; 37; 30 INJECTION, SOLUTION INTRAVENOUS at 07:16

## 2025-01-21 RX ADMIN — Medication 120 MG: at 07:40

## 2025-01-21 RX ADMIN — FENTANYL CITRATE 200 MCG: 50 INJECTION, SOLUTION INTRAMUSCULAR; INTRAVENOUS at 08:55

## 2025-01-21 RX ADMIN — SUGAMMADEX 200 MG: 100 INJECTION, SOLUTION INTRAVENOUS at 14:37

## 2025-01-21 RX ADMIN — NOREPINEPHRINE BITARTRATE 8 MCG: 1 INJECTION, SOLUTION, CONCENTRATE INTRAVENOUS at 11:04

## 2025-01-21 RX ADMIN — ALBUMIN HUMAN 250 ML: 0.05 INJECTION, SOLUTION INTRAVENOUS at 11:06

## 2025-01-21 RX ADMIN — NITROGLYCERIN 100 MCG: 10 INJECTION INTRAVENOUS at 08:56

## 2025-01-21 RX ADMIN — ROCURONIUM BROMIDE 20 MG: 10 INJECTION INTRAVENOUS at 10:05

## 2025-01-21 RX ADMIN — ROCURONIUM BROMIDE 50 MG: 10 INJECTION INTRAVENOUS at 08:12

## 2025-01-21 RX ADMIN — POTASSIUM CHLORIDE 20 MEQ: 14.9 INJECTION, SOLUTION INTRAVENOUS at 18:45

## 2025-01-21 RX ADMIN — PROPOFOL 15 MCG/KG/MIN: 10 INJECTION, EMULSION INTRAVENOUS at 18:33

## 2025-01-21 RX ADMIN — NOREPINEPHRINE BITARTRATE 8 MCG: 1 INJECTION, SOLUTION, CONCENTRATE INTRAVENOUS at 09:58

## 2025-01-21 RX ADMIN — PROPOFOL 30 MCG/KG/MIN: 10 INJECTION, EMULSION INTRAVENOUS at 14:20

## 2025-01-21 RX ADMIN — EPINEPHRINE IN SODIUM CHLORIDE 0.02 MCG/KG/MIN: 16 INJECTION INTRAVENOUS at 09:58

## 2025-01-21 RX ADMIN — ROCURONIUM BROMIDE 20 MG: 10 INJECTION INTRAVENOUS at 10:37

## 2025-01-21 RX ADMIN — POTASSIUM CHLORIDE 20 MEQ: 14.9 INJECTION, SOLUTION INTRAVENOUS at 19:19

## 2025-01-21 RX ADMIN — ACETAMINOPHEN 1000 MG: 10 INJECTION INTRAVENOUS at 17:20

## 2025-01-21 RX ADMIN — ROCURONIUM BROMIDE 20 MG: 10 INJECTION INTRAVENOUS at 11:09

## 2025-01-21 RX ADMIN — HYDRALAZINE HYDROCHLORIDE 5 MG: 20 INJECTION INTRAMUSCULAR; INTRAVENOUS at 17:21

## 2025-01-21 RX ADMIN — LIDOCAINE HYDROCHLORIDE 100 MG: 20 INJECTION, SOLUTION INFILTRATION; PERINEURAL at 07:40

## 2025-01-21 SDOH — HEALTH STABILITY: MENTAL HEALTH: CURRENT SMOKER: 0

## 2025-01-21 ASSESSMENT — PAIN - FUNCTIONAL ASSESSMENT
PAIN_FUNCTIONAL_ASSESSMENT: CPOT (CRITICAL CARE PAIN OBSERVATION TOOL)
PAIN_FUNCTIONAL_ASSESSMENT: 0-10

## 2025-01-21 ASSESSMENT — PAIN SCALES - GENERAL
PAIN_LEVEL: 0
PAINLEVEL_OUTOF10: 0 - NO PAIN

## 2025-01-21 ASSESSMENT — COLUMBIA-SUICIDE SEVERITY RATING SCALE - C-SSRS
6. HAVE YOU EVER DONE ANYTHING, STARTED TO DO ANYTHING, OR PREPARED TO DO ANYTHING TO END YOUR LIFE?: NO
2. HAVE YOU ACTUALLY HAD ANY THOUGHTS OF KILLING YOURSELF?: NO
1. IN THE PAST MONTH, HAVE YOU WISHED YOU WERE DEAD OR WISHED YOU COULD GO TO SLEEP AND NOT WAKE UP?: NO

## 2025-01-21 NOTE — ANESTHESIA PROCEDURE NOTES
Central Venous Line:    Date/Time: 1/21/2025 8:04 AM    A central venous line was placed in the OR for the following indication(s): central venous access and CVP monitoring.  Staffing  Performed: resident   Authorized by: Dean Garsia MD    Performed by: Sofia Hays DO    Sterility preparation included the following: provider hand hygiene performed prior to central venous catheter insertion, all 5 sterile barriers used (gloves, gown, cap, mask, large sterile drape) during central venous catheter insertion, antiseptic used during central venous catheter insertion and skin prep agent completely dried prior to procedure.  The patient was placed in Trendelenburg position.    Right internal jugular vein was prepped.    The site was prepped with Chlorhexidine.  Size: 9 Fr   Length: 11.5 (16 cm)  Catheter type: introducer   Number of Lumens: double lumen    This catheter was not an oximetric catheter.    During the procedure, the following specific steps were taken: target vein identified, needle advanced into vein and blood aspirated and guidewire advanced into vein.  Seldinger technique used.  Procedure performed using ultrasound guidance.  Sterile gel and probe cover used in ultrasound-guided central venous catheter insertion.    Intravenous verification was obtained by ultrasound, venous blood return and manometry.      Post insertion care included: all ports aspirated, all ports flushed easily, guidewire removed intact, Biopatch applied, line sutured in place and dressing applied.    During the procedure the patient experienced: patient tolerated procedure well with no complications.          Additional notes:  Mini MAC three lumen catheter (7 Fr, 16 cm) introduced under sterile conditions. All ports aspirated and flushed easily.

## 2025-01-21 NOTE — ANESTHESIA PROCEDURE NOTES
Airway  Date/Time: 1/21/2025 7:42 AM  Urgency: elective    Airway not difficult    Staffing  Performed: resident   Authorized by: Dean Garsia MD    Performed by: Sofia Hays DO  Patient location during procedure: OR    Indications and Patient Condition  Indications for airway management: anesthesia and airway protection  Spontaneous Ventilation: absent  Sedation level: deep  Preoxygenated: yes  Patient position: sniffing  MILS maintained throughout  Mask difficulty assessment: 1 - vent by mask    Final Airway Details  Final airway type: endotracheal airway      Successful airway: ETT and ETT - double lumen left  Cuffed: yes   Successful intubation technique: direct laryngoscopy  Facilitating devices/methods: intubating stylet  Endotracheal tube insertion site: oral  Blade: Lorie  Blade size: #4  ETT size (mm): 7.5  ETT DL size (fr): 39  Cormack-Lehane Classification: grade I - full view of glottis  Placement verified by: chest auscultation, bronchoscopy and capnometry   Measured from: lips  ETT to lips (cm): 28  Number of attempts at approach: 1  Number of other approaches attempted: 0

## 2025-01-21 NOTE — ANESTHESIA PROCEDURE NOTES
Peripheral Block    Patient location during procedure: pre-op  Start time: 1/21/2025 6:40 AM  End time: 1/21/2025 6:55 AM  Reason for block: at surgeon's request and post-op pain management  Staffing  Performed: resident   Authorized by: Dara Castaneda MD    Performed by: Bethany Caban MD  Preanesthetic Checklist  Completed: patient identified, IV checked, site marked, risks and benefits discussed, surgical consent, monitors and equipment checked, pre-op evaluation and timeout performed   Timeout performed at: 1/21/2025 6:40 AM  Peripheral Block  Patient position: sitting  Prep: ChloraPrep  Patient monitoring: heart rate and continuous pulse ox  Block type: ROSE  Injection technique: catheter  Guidance: ultrasound guided  Local infiltration: lidocaine  Infiltration strength: 1 %  Dose: 3 mL  Needle  Needle type: Tuohy   Needle gauge: 22 G  Needle length: 8 cm  Needle localization: ultrasound guidance     image stored in chart  Assessment  Injection assessment: negative aspiration for heme, no paresthesia on injection, incremental injection and local visualized surrounding nerve on ultrasound  Additional Notes  Erector spinae plane block:     Prior to procedure: Following a focused history, procedure-related and patient-specific complications were discussed. Risks, benefits, and alternatives were explained. Informed, written consent was provided by the patient and/or surrogate decision maker for the block. Anticoagulation (if any) was held per GUY guidelines. ASA monitors were applied. Patient was positioned, prepped with chlorhexidine, and draped with sterile towels.     Ultrasound guidance was used to visualize the erector spinae muscle above the TP/costal junction at T7. Skin was numbed with 1% lidocaine. Needle was inserted and advanced towards target with visualization of the needle throughout duration of the procedure. A total of Type of Local: 15 cc of 0.5% ropivacaine, was divided and injected  unilaterally. Catheter threaded and secured. Patient tolerated procedure well.    Timeout by SAM Goode

## 2025-01-21 NOTE — ANESTHESIA POSTPROCEDURE EVALUATION
"Patient: Parish Valerio \"Bret\"    Procedure Summary       Date: 01/21/25 Room / Location: [facility] OR 18 / [facility] [place]    Anesthesia Start: 0713 Anesthesia Stop: 1440    Procedure: Robotic MIDCAB x2, LIMA to LAD, PRINCE to DIAG; posterior pericardiotomy Diagnosis:       Coronary artery disease involving native coronary artery of native heart, unspecified whether angina present      (Coronary artery disease involving native coronary artery of native heart, unspecified whether angina present [I25.10])    Surgeons: Sofia Carrasco MD Responsible Provider: Dean Garsia MD    Anesthesia Type: general ASA Status: 4            Anesthesia Type: general    Vitals Value Taken Time   /58 01/21/25 1441   Temp 36 01/21/25 1441   Pulse 62 01/21/25 1440   Resp 16 01/21/25 1440   SpO2 100 % 01/21/25 1436   Vitals shown include unfiled device data.    Anesthesia Post Evaluation    Patient location during evaluation: ICU  Patient participation: complete - patient participated  Level of consciousness: sedated  Pain score: 0  Pain management: adequate  Airway patency: patent  Cardiovascular status: acceptable  Respiratory status: acceptable and ETT  Hydration status: acceptable  Postoperative Nausea and Vomiting: none        No notable events documented.    "

## 2025-01-21 NOTE — OP NOTE
"OPERATIVE REPORT     Date: 2025  OR Location: [facility] OR    Name: Parish Valerio \"Bret\", : 1950, Age: 74 y.o., MRN: [id number], Sex: male    Diagnosis  Pre-op Diagnosis      * Coronary artery disease involving native coronary artery of native heart, unspecified whether angina present [I25.10]     Post-op Diagnosis     * Coronary artery disease involving native coronary artery of native heart, unspecified whether angina present [I25.10]         Procedures  Robotic assisted minimally invasive CABG x2 LIMA to LAD; PRINCE Y'd off LIMA to Diagonal   Posterior pericardial window      Surgeons   MD Orlin Hinojosa MD There was no qualified resident available to assist with the case and he assisted with all the key portions of the case.       Resident/Fellow/Other Assistant:  JAYNE Sears    The SA/KELSIA/PA was scrubbed throughout the procedure and assisted with handling and retracting tissues as well as conduit harvest and sternal closure where applicable     Procedure Summary  Anesthesia: General  ASA: IV  Anesthesia Staff:   Anesthesiologist: Dean Garsia MD  Anesthesia Resident: Sofia Hays DO    Estimated Blood Loss: 250mL    Specimen: No specimens collected     Staff:   Circulator: Melanie Swan RN; Kristyn Barrientos RN; Amie Gardner RN  Relief Circulator: Hanane Sanches RN  Relief Scrub: Don Salazar  Scrub Person: Irene Marvin        Findings: JON to LAD          PRINCE to Diag     Indications: Parish Valerio \"Bret\" is an 74 y.o. male with hx of CAD and Heart Failure s/p medical therapy now with improved EF with plan for robotic assisted midcabg with LIMA to LAD and PRINCE to Diag    The risks benefits and alternatives were discussed with the patient and include but are not limited to, bleeding, infection, injury to other structures, need for re-operation, arrhythmia, respiratory failure, prolonged intubation, stroke, and death.  These were discussed with " the patient in detail, all questions were answered and informed consent was obtained.      Procedure Details:   The patient was taken to the operating room by anesthesia, placed supine on the operating table, intubated  with a double lumen tube and placed under general anesthesia.  A central line and katelynn were placed. BEKHA was performed which confirmed the preoperative findings of reduced EF without significant valvular disease.  The patient was prepped and draped in the usual sterile fashion.  A time out was performed. The patient was placed on single right lung ventilation. Three 8mm robotic ports were placed at the 2nd, 5th and 7th intercostal spaces under direct visualization.  Pneumothorax was induced to 8mmHg and the robot was docked.  I began with the PRINCE harvest.  Cautery was used to dissect across the midline and expose the PRINCE, this was then harvested as a skeletonized free graft from the second intercostal space to the distal bifurcation.  The LIMA was then harvested in a skeletonized fashion as well.  The pericardium was opened and targets were identified.  A posterior pericardotomy was performed.  Heparin was administered.  The PRINCE was divided proximally and distally with clips and secured to the edge of the pericardium.  The LIMA was divided distally and tacked in placed along the medial aspect of the pericardium.  The robot was then undocked.  The trocar site at the 5th intercostal space was extended several centimeters and the thoratrack retractor was placed. The stabilizer device was inserted and used as a table.  The PRINCE was then taken off the LIMA in an end to side fashion with a running 8-0 prolene suture.  The Lateral wall was then exposed with the urchine retractor and the Ramus identified.  It was a bit posterior and difficult to access so the decision was made to place the patient on cardiopulmonary bypass via femoral cannulation.  Additional heparin was administered and bypass initiated  when the ACT was greater than 400.  Once on bypass the heart was retracted medially and the stabilizer was placed around the ramus. The vessel was opened and a 2mm shunt was placed. The PRINCE was anastomosed with a running 7-0 prolene suture. The heart was let down and flows were checked in the PRINCE and noted to be excellent.  The stabilizer was then placed around the LAD. The LIMA was prepared. It was noted to have excellent flow.  The LAD was opened and a 2mm shunt was placed.  The LIMA was anastomosed using a running 7-0 prolene suture.  The LIMA to LAD flows were assess with the flow probe.  There was good flow with a low PI.  The stabilizer was removed and the flows were confirmed in both grafts and noted to still be appropriate.  Protamine was administered.  Hemostasis was meticulously secured.  A chest tube was placed through the track where the stabilizer device had been inserted.  The ribs were reapproximated with #5 vicryl sutures.  The fascia was closed with running 0-vicryl suture.  The subcutaneous tissue was closed in layers with the skin closed with a running 4-0 vicryl suture.  A dry sterile dressing was applied.  All instrument, needle and sponge counts were correct at the end of the case.  The patient was left intubated and transferred to the ICU in stable condition.             Sofia Carrasco MD  Cardiac Surgery  01/21/25  4:11 PM
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
## 2025-01-21 NOTE — ANESTHESIA PROCEDURE NOTES
Peripheral IV  Date/Time: 1/21/2025 7:51 AM      Placement  Needle size: 16 G  Laterality: right  Location: hand  Site prep: alcohol  Technique: anatomical landmarks  Attempts: 1

## 2025-01-21 NOTE — H&P
Updated H&P:  Patient seen and examined. No major changes in his baseline state of health.     Plan:  OR today for robotic MIDCAB    Tavon Cash MD  Cardiac Surgery Fellow  PGY-7  Pager: [phone]    --------------------------------------------------------------------------------    [facility]     Referred by Dr. Thornton for CABG Evaluation     Chief Complaint  Follow up testing, discuss surgery     HPI:   Mr. Parish Valerio is an 74 y.o. male, who is a patient of Dr.Timothy DAYAN Owens DO.  I have been asked to see him by Dr. Thornton to evaluate Coronary Artery Disease.     Parish Valerio has a PMHx signifcant for CAD s/p PCI (diag; ), stage C systolic HF/ICM/HFrEF with moderate LV dysfunction currently without an ICD, pAF on AAD and DOAC therapies, and remote h/o polio. He is being followed by Dr. Thornton for heart failure management.  He is doing well and continuing to gain weight.  Repeat ECHO demonstrates relatively good LV function. Patient denies any chest pain.         Medical History   No past medical history on file.        Surgical History         Past Surgical History:   Procedure Laterality Date    CORONARY ANGIOPLASTY WITH STENT PLACEMENT        DENTAL SURGERY        TESTICLE SURGERY                Family History          Family History   Problem Relation Name Age of Onset    Stroke Mother        Heart attack Father                Social History               Socioeconomic History    Marital status:        Spouse name: Not on file    Number of children: Not on file    Years of education: Not on file    Highest education level: Not on file   Occupational History    Not on file   Tobacco Use    Smoking status: Former       Current packs/day: 0.00       Types: Cigarettes       Quit date: 2024       Years since quittin.0    Smokeless tobacco: Never   Substance and Sexual Activity    Alcohol use: Yes       Comment: socially    Drug use:  Never    Sexual activity: Not on file   Other Topics Concern    Not on file   Social History Narrative    Not on file      Social Drivers of Health           Financial Resource Strain: Low Risk  (7/7/2021)     Received from [facility]     Overall Financial Resource Strain (CARDIA)      Difficulty of Paying Living Expenses: Not very hard   Food Insecurity: No Food Insecurity (1/9/2024)     Received from [facility]     Hunger Vital Sign      Worried About Running Out of Food in the Last Year: Never true      Ran Out of Food in the Last Year: Never true   Transportation Needs: No Transportation Needs (1/9/2024)     Received from [facility]     PRAPARE - Transportation      Lack of Transportation (Medical): No      Lack of Transportation (Non-Medical): No   Physical Activity: Not on file   Stress: Not on file   Social Connections: Unknown (7/7/2021)     Received from [facility]     Social Connection and Isolation Panel [NHANES]      Frequency of Communication with Friends and Family: Not on file      Frequency of Social Gatherings with Friends and Family: Not on file      Attends Scientology Services: Not on file      Active Member of Clubs or Organizations: No      Attends Club or Organization Meetings: Never      Marital Status: Not on file   Intimate Partner Violence: Not At Risk (1/9/2024)     Received from [facility]     Humiliation, Afraid, Rape, and Kick questionnaire      Fear of Current or Ex-Partner: No      Emotionally Abused: No      Physically Abused: No      Sexually Abused: No   Housing Stability: Not on file            RX Allergies[]Expand by Default        Allergies   Allergen Reactions    Farxiga [Dapagliflozin] Nausea/vomiting            Encounter Medications          Outpatient Encounter Medications as of 1/8/2025   Medication Sig Dispense Refill    amiodarone (Pacerone) 200 mg tablet Take 1 tablet (200 mg) by mouth once daily. 90 tablet 3    apixaban (Eliquis) 5 mg tablet Take 1 tablet (5 mg) by mouth  "2 times a day. 180 tablet 3    ARIPiprazole (Abilify) 5 mg tablet Take 1 tablet (5 mg) by mouth once daily at bedtime.        aspirin 81 mg EC tablet Take 1 tablet (81 mg) by mouth once daily.        atorvastatin (Lipitor) 80 mg tablet Take 1 tablet (80 mg) by mouth once daily.        furosemide (Lasix) 40 mg tablet Take 0.5 tablets (20 mg) by mouth once daily.        lamoTRIgine (LaMICtal) 150 mg tablet Take 1 tablet (150 mg) by mouth once daily at bedtime.        lamoTRIgine (LaMICtal) 200 mg tablet Take 1 tablet (200 mg) by mouth once daily in the morning.        sacubitriL-valsartan (Entresto) 24-26 mg tablet Take 0.5 tablets by mouth 2 times a day. 30 tablet 10      No facility-administered encounter medications on file as of 1/8/2025.               Physical Exam:   General: no acute distress  CV: regular rate and rhythm  Resp: symmetrical chest rise and fall, no increased work of breathing  Extremities: moving all extremities  Abdomen: soft non tender, non distended        No results found for: \"WBC\", \"HGB\", \"HCT\", \"MCV\", \"PLT\"  No results found for: \"GLUCOSE\", \"CALCIUM\", \"NA\", \"K\", \"CO2\", \"CL\", \"BUN\", \"CREATININE\"        Pertinent Diagnostics:     TTE (11/25/24)  1. Left ventricular ejection fraction is normal, calculated by Bran's biplane at 55%.   2. Spectral Doppler shows a Grade I (impaired relaxation pattern) of left ventricular diastolic filling with normal left atrial filling pressure.   3. There is no evidence of left ventricular hypertrophy.   4. There is normal right ventricular global systolic function.   5. Moderate mitral valve regurgitation.   6. No evidence of mitral valve prolapse.   7. There is aortic valve annular calcification.   8. Mild to moderate aortic valve regurgitation     Cardiac Catheterization (12/16/24)     CT (11/24/24)  1. Ectatic ascending aortic measuring up to 4.3 cm.   2. Hypodense peripherally calcified structure along the aortic arch   wall which may represent " partially calcified plaque or a small   dissection. Recommend further evaluation with CTA of the chest.   3. Coronary artery calcifications, recommend correlation with   cardiovascular risk factors.   4. Findings of respiratory bronchiolitis/smoking related small   airways disease.   5. Few small noncalcified pulmonary nodules measuring 2-3 mm.   Incidental Finding:  A non-calcified pulmonary nodule / multiple   non-calcified pulmonary nodules measuring less than 6 mm, likely   benign.         Assessment and Plan:   Mr. Parish Valerio is an 74 y.o. male who has been referred with Coronary Artery Disease.       I had a chance to review all available, pertinent investigations.  My interpretation of these studies is as follows:       ____________________________________________________________  Sofia Carrasco MD  Assistant Professor of Surgery  Division of Cardiac Surgery     [facility]  [facility]

## 2025-01-21 NOTE — ANESTHESIA PROCEDURE NOTES
Arterial Line:    Date/Time: 1/21/2025 7:33 AM    Staffing  Performed: attending and resident   Authorized by: Dean Garsia MD    Performed by: Sofia Hays DO    An arterial line was placed. Procedure performed using ultrasound guidance.in the OR for the following indication(s): continuous blood pressure monitoring and blood sampling needed.    A 20 gauge (size), 12 cm (length), Arrow (type) catheter was placed into the Right brachial artery, secured by Tegaderm,   Seldinger technique used.  Events:  patient tolerated procedure well with no complications.

## 2025-01-21 NOTE — BRIEF OP NOTE
"Date: 2025  OR Location: [facility] OR    Name: Parish Valerio \"Bret\", : 1950, Age: 74 y.o., MRN: [id number], Sex: male    Diagnosis  Pre-op Diagnosis      * Coronary artery disease involving native coronary artery of native heart, unspecified whether angina present [I25.10] Post-op Diagnosis     * Coronary artery disease involving native coronary artery of native heart, unspecified whether angina present [I25.10]     Procedures  Robotic MIDCAB x2, LIMA to LAD, PRINCE to DIAG; posterior pericardiotomy  00539 - NV CABG W/ARTERIAL GRAFT SINGLE ARTERIAL GRAFT  Robotic MIDCAB x2 (LIMA to LAD, PRINCE to DIAG)   Posterior pericardiotomy      Chest Tubes/Drains: x 2 (right and left plural space)    Surgeons      * Sofia Carrasco - Primary    Resident/Fellow/Other Assistant:  Surgeons and Role:  * No surgeons found with a matching role *  Meri Cheung PA-C   Staff:   Circulator: Kristyn Zhangub Person: Irene  Circulator: Amie  Surgical Assistant: Wes  Surgical Assistant: Katharine  Surgical Assistant: Cynthia García Scrub: Don García Circulator: Hanane  Circulator: Melanie    Anesthesia Staff: Anesthesiologist: Dean Garsia MD  Anesthesia Resident: Sofia Hays DO    Procedure Summary  Anesthesia: General  ASA: IV  Estimated Blood Loss: 250 mL  Intra-op Medications:   Administrations occurring from 0650 to 1245 on 25:   Medication Name Total Dose   papaverine injection 180 mg   albumin human bottle 5% 750 mL   ceFAZolin (Ancef) vial 1 g 4 g   electrolyte-A (Plasmalyte-A) solution Cannot be calculated   electrolyte-A (Plasmalyte-A) 434.17 mL   EPINEPHrine (Adrenalin) 4 mg in sodium chloride 0.9% 250 mL (16 mcg/mL) infusion (premix) 0.23 mg   fentaNYL (Sublimaze) injection 50 mcg/mL 800 mcg   heparin injection 1,000 units/mL 15,000 Units   lidocaine (Xylocaine) injection 2 % 100 mg   nitroglycerin 100 mcg/mL D5W intracoronary syringe - compounded 100 mcg   norepinephrine (Levophed) 8 mg " in sodium chloride 0.9% 250 mL (0.032 mg/mL) infusion (premix) 0.37 mg   norepinephrine (Levophed) 16 mcg/mL syringe for Anesthesia 84 mcg   propofol (Diprivan) injection 10 mg/mL 200 mg   rocuronium (ZeMuron) 50 mg/5 mL injection 220 mg   ropivacaine 0.2 % (PF) (Naropin) epidural pump infusion in NS 32.83 mL   succinylcholine 100 mg/5 mL syringe 120 mg              Anesthesia Record               Intraprocedure I/O Totals          Intake    electrolyte-A 3250.00 mL    Norepinephrine Drip 0.00 mL    The total shown is the total volume documented since Anesthesia Start was filed.    Epinephrine Drip 0.00 mL    The total shown is the total volume documented since Anesthesia Start was filed.    Total Intake 3250 mL       Output    Urine 335 mL    Total Output 335 mL       Net    Net Volume 2915 mL          Specimen: No specimens collected         Complications:  None; patient tolerated the procedure well.     Disposition: ICU - intubated and hemodynamically stable.  Condition: stable  Specimens Collected: No specimens collected  Attending Attestation: I was present and scrubbed for the key portions of the procedure.    Sofia Carrasco  Phone Number: [phone]

## 2025-01-21 NOTE — CONSULTS
Parish Valerio is a 74 y.o. year old male patient who presents for Procedure(s):  MIDCAB x 2, ROBOT-ASSISTED; RADIAL with Sofia Carrasoc MD on 2025. Acute Pain consulted for assistance with pain control.     Anticipated Postop Pain Issues -   Palliative: typically relieved with IV analgesics and regional local anesthetics  Provocative: typically with movement  Quality: typically burning and aching  Radiation: typically none  Severity: typically severe 8-10/10  Timing: typically constant    Past Medical History:   Diagnosis Date    CHF (congestive heart failure)     Coronary artery disease         Past Surgical History:   Procedure Laterality Date    CORONARY ANGIOPLASTY WITH STENT PLACEMENT      DENTAL SURGERY      TESTICLE SURGERY          Family History   Problem Relation Name Age of Onset    Stroke Mother      Heart attack Father          Social History     Socioeconomic History    Marital status:      Spouse name: Not on file    Number of children: Not on file    Years of education: Not on file    Highest education level: Not on file   Occupational History    Not on file   Tobacco Use    Smoking status: Former     Current packs/day: 0.00     Types: Cigarettes     Quit date: 2024     Years since quittin.0    Smokeless tobacco: Never   Substance and Sexual Activity    Alcohol use: Yes     Comment: socially    Drug use: Never    Sexual activity: Not on file   Other Topics Concern    Not on file   Social History Narrative    Not on file     Social Drivers of Health     Financial Resource Strain: Low Risk  (2021)    Received from [facility]    Overall Financial Resource Strain (CARDIA)     Difficulty of Paying Living Expenses: Not very hard   Food Insecurity: No Food Insecurity (2024)    Received from [facility]    Hunger Vital Sign     Worried About Running Out of Food in the Last Year: Never true     Ran Out of Food in the Last Year: Never true   Transportation Needs: No  Transportation Needs (1/9/2024)    Received from [facility]    PRAPARE - Transportation     Lack of Transportation (Medical): No     Lack of Transportation (Non-Medical): No   Physical Activity: Not on file   Stress: Not on file   Social Connections: Unknown (7/7/2021)    Received from [facility]    Social Connection and Isolation Panel [NHANES]     Frequency of Communication with Friends and Family: Not on file     Frequency of Social Gatherings with Friends and Family: Not on file     Attends Jainism Services: Not on file     Active Member of Clubs or Organizations: No     Attends Club or Organization Meetings: Never     Marital Status: Not on file   Intimate Partner Violence: Not At Risk (1/9/2024)    Received from [facility]    Humiliation, Afraid, Rape, and Kick questionnaire     Fear of Current or Ex-Partner: No     Emotionally Abused: No     Physically Abused: No     Sexually Abused: No   Housing Stability: Not on file        Allergies   Allergen Reactions    Farxiga [Dapagliflozin] Nausea/vomiting         Review of Systems  Gen: No fatigue, anorexia, insomnia, fever.   Eyes: No vision loss, double vision, drainage, eye pain.   ENT: No pharyngitis, dry mouth, no hearing changes or ear discharge  Cardiac: No chest pain, palpitations, syncope, near syncope.   Pulmonary: No shortness of breath, cough, hemoptysis.   Heme/lymph: No swollen glands, fever, bleeding.   GI: No abdominal pain, change in bowel habits, melena, hematemesis, hematochezia, nausea, vomiting, diarrhea.   : No discharge, dysuria, frequency, urgency, hematuria.  Endo: No polyuria or weight loss.   Musculoskeletal: Negative for any pain or loss of ROM/weakness  Skin: No rashes or lesions  Neuro: Normal speech, no numbness or weakness. No gait difficulties  Review of systems is otherwise negative unless stated above or in history of present illness.    Physical Exam:  Constitutional:  no distress, alert and cooperative  Eyes: clear  sclera  Head/Neck: No apparent injury, trachea midline  Respiratory/Thorax: Patent airways, thorax symmetric, breathing comfortably  Cardiovascular: no pitting edema  Gastrointestinal: Nondistended  Musculoskeletal: ROM intact  Extremities: no clubbing  Neurological: alert, lara x4  Psychological: Appropriate affect    Results for orders placed or performed during the hospital encounter of 01/21/25 (from the past 24 hours)   Verify ABO/Rh Group Test (VERAB)   Result Value Ref Range    ABO TYPE A     Rh TYPE POS    Prepare RBC: 4 Units   Result Value Ref Range    PRODUCT CODE F0112O33     Unit Number [id number]     Unit ABO A     Unit RH POS     XM INTEP COMP     Dispense Status XM     Blood Expiration Date 2/1/2025 11:59:00 PM EST     PRODUCT BLOOD TYPE 6200     UNIT VOLUME 350     PRODUCT CODE X8121N02     Unit Number [id number]     Unit ABO A     Unit RH POS     XM INTEP COMP     Dispense Status XM     Blood Expiration Date 2/1/2025 11:59:00 PM EST     PRODUCT BLOOD TYPE 6200     UNIT VOLUME 350     PRODUCT CODE P1368B06     Unit Number [id number]     Unit ABO A     Unit RH POS     XM INTEP COMP     Dispense Status XM     Blood Expiration Date 2/1/2025 11:59:00 PM EST     PRODUCT BLOOD TYPE 6200     UNIT VOLUME 350     PRODUCT CODE V0752K56     Unit Number [id number]     Unit ABO A     Unit RH POS     XM INTEP COMP     Dispense Status XM     Blood Expiration Date 2/1/2025 11:59:00 PM EST     PRODUCT BLOOD TYPE 6200     UNIT VOLUME 350    Blood Gas Arterial Full Panel Unsolicited   Result Value Ref Range    POCT pH, Arterial 7.42 7.38 - 7.42 pH    POCT pCO2, Arterial 35 (L) 38 - 42 mm Hg    POCT pO2, Arterial 79 (L) 85 - 95 mm Hg    POCT SO2, Arterial 97 94 - 100 %    POCT Oxy Hemoglobin, Arterial 95.9 94.0 - 98.0 %    POCT Hematocrit Calculated, Arterial 40.0 (L) 41.0 - 52.0 %    POCT Sodium, Arterial 137 136 - 145 mmol/L    POCT Potassium, Arterial 3.8 3.5 - 5.3 mmol/L    POCT Chloride,  Arterial 104 98 - 107 mmol/L    POCT Ionized Calcium, Arterial 1.22 1.10 - 1.33 mmol/L    POCT Glucose, Arterial 92 74 - 99 mg/dL    POCT Lactate, Arterial 3.4 (H) 0.4 - 2.0 mmol/L    POCT Base Excess, Arterial -1.3 -2.0 - 3.0 mmol/L    POCT HCO3 Calculated, Arterial 22.7 22.0 - 26.0 mmol/L    POCT Hemoglobin, Arterial 13.3 (L) 13.5 - 17.5 g/dL    POCT Anion Gap, Arterial 14 10 - 25 mmo/L    Patient Temperature 37.0 degrees Celsius    FiO2 21 %   Coox Panel, Arterial Unsolicited   Result Value Ref Range    POCT Hemoglobin, Arterial 13.3 (L) 13.5 - 17.5 g/dL    POCT Oxy Hemoglobin, Arterial 95.9 94.0 - 98.0 %    POCT Carboxyhemoglobin, Arterial 0.9 %    POCT Methemoglobin, Arterial 0.5 0.0 - 1.5 %    POCT Deoxy Hemoglobin, Arterial 2.7 0.0 - 5.0 %   ACTIVATED CLOTTING TIME HIGH   Result Value Ref Range    POCT Activated Clotting Time High Range 101 82 - 174 sec        Parish Valerio is a 74 y.o. year old male patient who presents for Procedure(s):  MIDCAB x 2, ROBOT-ASSISTED; RADIAL with Sofia Carrasco MD on 1/21/2025. Acute Pain consulted for assistance with pain control.     Plan:    - Left erector spinae block with catheter performed pre-operatively on 1/21/2025  - Ambit ball with Ropivacaine 0.2%/NaCl 0.9% 500mL, Rate 10 cc/hr unilaterally  - Ambit medication will not interfere with pain medication prescribed by the primary team.   - Please be aware of local anesthetic toxic dose and absorption variability before considering lidocaine patches  - Acute pain service will follow while catheters in place  - Rest of pain management per primary team    Acute Pain Resident  pg [phone] ph [phone]

## 2025-01-21 NOTE — ANESTHESIA PREPROCEDURE EVALUATION
"Patient: Parish Valerio \"Bret\"    Procedure Information       Date/Time: 01/21/25 0650    Procedure: MIDCAB x 2, ROBOT-ASSISTED; RADIAL    Location: [facility] OR 18 / [facility] OR    Surgeons: Sofia Carrasco MD            Relevant Problems   Anesthesia (within normal limits)      Cardiac   (+) A-fib (Multi)   (+) ASHD (arteriosclerotic heart disease)   (+) Coronary arteriosclerosis after percutaneous transluminal coronary angioplasty (PTCA)   (+) Coronary artery disease involving native coronary artery of native heart   (+) Coronary artery disease involving native coronary artery of native heart, unspecified whether angina present   (+) Hyperlipidemia      Pulmonary (within normal limits)      Neuro (within normal limits)      GI (within normal limits)      /Renal (within normal limits)      Liver (within normal limits)      Endocrine (within normal limits)      Hematology   (+) Anticoagulant long-term use (Last dose of Eliquis 1/17)       Clinical information reviewed:    Allergies  Meds               NPO Detail:  No data recorded     Physical Exam    Airway  Mallampati: II  TM distance: >3 FB  Neck ROM: full     Cardiovascular    Dental   (+) upper dentures     Pulmonary - normal exam     Abdominal          Anesthesia Plan    History of general anesthesia?: yes  History of complications of general anesthesia?: no    ASA 4     general     The patient is not a current smoker.    intravenous induction   Postoperative administration of opioids is intended.  Anesthetic plan and risks discussed with patient.  Use of blood products discussed with patient who consented to blood products.    Plan discussed with attending.    "

## 2025-01-22 ENCOUNTER — APPOINTMENT (OUTPATIENT)
Dept: RADIOLOGY | Facility: HOSPITAL | Age: 75
DRG: 235 | End: 2025-01-22
Payer: MEDICARE

## 2025-01-22 ENCOUNTER — APPOINTMENT (OUTPATIENT)
Dept: CARDIOLOGY | Facility: HOSPITAL | Age: 75
DRG: 235 | End: 2025-01-22
Payer: MEDICARE

## 2025-01-22 LAB
ALBUMIN SERPL BCP-MCNC: 3.7 G/DL (ref 3.4–5)
ALBUMIN SERPL BCP-MCNC: 3.7 G/DL (ref 3.4–5)
ANION GAP BLDA CALCULATED.4IONS-SCNC: 12 MMO/L (ref 10–25)
ANION GAP BLDA CALCULATED.4IONS-SCNC: 13 MMO/L (ref 10–25)
ANION GAP BLDA CALCULATED.4IONS-SCNC: 8 MMO/L (ref 10–25)
ANION GAP BLDV CALCULATED.4IONS-SCNC: 6 MMOL/L (ref 10–25)
ANION GAP SERPL CALC-SCNC: 10 MMOL/L (ref 10–20)
ANION GAP SERPL CALC-SCNC: 12 MMOL/L (ref 10–20)
BASE EXCESS BLDA CALC-SCNC: -1.2 MMOL/L (ref -2–3)
BASE EXCESS BLDA CALC-SCNC: -3 MMOL/L (ref -2–3)
BASE EXCESS BLDA CALC-SCNC: -3.8 MMOL/L (ref -2–3)
BASE EXCESS BLDV CALC-SCNC: -0.8 MMOL/L (ref -2–3)
BODY TEMPERATURE: 37 DEGREES CELSIUS
BUN SERPL-MCNC: 11 MG/DL (ref 6–23)
BUN SERPL-MCNC: 12 MG/DL (ref 6–23)
CA-I BLD-SCNC: 1.11 MMOL/L (ref 1.1–1.33)
CA-I BLD-SCNC: 1.18 MMOL/L (ref 1.1–1.33)
CA-I BLDA-SCNC: 1.14 MMOL/L (ref 1.1–1.33)
CA-I BLDA-SCNC: 1.19 MMOL/L (ref 1.1–1.33)
CA-I BLDA-SCNC: 1.21 MMOL/L (ref 1.1–1.33)
CA-I BLDV-SCNC: 1.18 MMOL/L (ref 1.1–1.33)
CALCIUM SERPL-MCNC: 8 MG/DL (ref 8.6–10.6)
CALCIUM SERPL-MCNC: 8.3 MG/DL (ref 8.6–10.6)
CHLORIDE BLDA-SCNC: 105 MMOL/L (ref 98–107)
CHLORIDE BLDA-SCNC: 106 MMOL/L (ref 98–107)
CHLORIDE BLDA-SCNC: 107 MMOL/L (ref 98–107)
CHLORIDE BLDV-SCNC: 108 MMOL/L (ref 98–107)
CHLORIDE SERPL-SCNC: 106 MMOL/L (ref 98–107)
CHLORIDE SERPL-SCNC: 107 MMOL/L (ref 98–107)
CO2 SERPL-SCNC: 23 MMOL/L (ref 21–32)
CO2 SERPL-SCNC: 26 MMOL/L (ref 21–32)
CREAT SERPL-MCNC: 0.78 MG/DL (ref 0.5–1.3)
CREAT SERPL-MCNC: 0.81 MG/DL (ref 0.5–1.3)
EGFRCR SERPLBLD CKD-EPI 2021: >90 ML/MIN/1.73M*2
EGFRCR SERPLBLD CKD-EPI 2021: >90 ML/MIN/1.73M*2
ERYTHROCYTE [DISTWIDTH] IN BLOOD BY AUTOMATED COUNT: 13.5 % (ref 11.5–14.5)
ERYTHROCYTE [DISTWIDTH] IN BLOOD BY AUTOMATED COUNT: 13.7 % (ref 11.5–14.5)
GLUCOSE BLD MANUAL STRIP-MCNC: 100 MG/DL (ref 74–99)
GLUCOSE BLD MANUAL STRIP-MCNC: 102 MG/DL (ref 74–99)
GLUCOSE BLD MANUAL STRIP-MCNC: 111 MG/DL (ref 74–99)
GLUCOSE BLD MANUAL STRIP-MCNC: 93 MG/DL (ref 74–99)
GLUCOSE BLD MANUAL STRIP-MCNC: 99 MG/DL (ref 74–99)
GLUCOSE BLDA-MCNC: 103 MG/DL (ref 74–99)
GLUCOSE BLDA-MCNC: 93 MG/DL (ref 74–99)
GLUCOSE BLDA-MCNC: 94 MG/DL (ref 74–99)
GLUCOSE BLDV-MCNC: 102 MG/DL (ref 74–99)
GLUCOSE SERPL-MCNC: 109 MG/DL (ref 74–99)
GLUCOSE SERPL-MCNC: 98 MG/DL (ref 74–99)
HCO3 BLDA-SCNC: 21.5 MMOL/L (ref 22–26)
HCO3 BLDA-SCNC: 22.7 MMOL/L (ref 22–26)
HCO3 BLDA-SCNC: 24.3 MMOL/L (ref 22–26)
HCO3 BLDV-SCNC: 25.3 MMOL/L (ref 22–26)
HCT VFR BLD AUTO: 27.1 % (ref 41–52)
HCT VFR BLD AUTO: 30.7 % (ref 41–52)
HCT VFR BLD EST: 28 % (ref 41–52)
HCT VFR BLD EST: 31 % (ref 41–52)
HCT VFR BLD EST: 31 % (ref 41–52)
HCT VFR BLD EST: 33 % (ref 41–52)
HGB BLD-MCNC: 10.3 G/DL (ref 13.5–17.5)
HGB BLD-MCNC: 9.2 G/DL (ref 13.5–17.5)
HGB BLDA-MCNC: 10.2 G/DL (ref 13.5–17.5)
HGB BLDA-MCNC: 10.4 G/DL (ref 13.5–17.5)
HGB BLDA-MCNC: 11.1 G/DL (ref 13.5–17.5)
HGB BLDV-MCNC: 9.2 G/DL (ref 13.5–17.5)
INHALED O2 CONCENTRATION: 40 %
INHALED O2 CONCENTRATION: 45 %
LACTATE BLDA-SCNC: 0.4 MMOL/L (ref 0.4–2)
LACTATE BLDA-SCNC: 0.8 MMOL/L (ref 0.4–2)
LACTATE BLDA-SCNC: 0.8 MMOL/L (ref 0.4–2)
LACTATE BLDV-SCNC: 0.9 MMOL/L (ref 0.4–2)
MAGNESIUM SERPL-MCNC: 2.21 MG/DL (ref 1.6–2.4)
MAGNESIUM SERPL-MCNC: 2.26 MG/DL (ref 1.6–2.4)
MCH RBC QN AUTO: 31.9 PG (ref 26–34)
MCH RBC QN AUTO: 32.3 PG (ref 26–34)
MCHC RBC AUTO-ENTMCNC: 33.6 G/DL (ref 32–36)
MCHC RBC AUTO-ENTMCNC: 33.9 G/DL (ref 32–36)
MCV RBC AUTO: 95 FL (ref 80–100)
MCV RBC AUTO: 95 FL (ref 80–100)
NRBC BLD-RTO: 0 /100 WBCS (ref 0–0)
NRBC BLD-RTO: 0 /100 WBCS (ref 0–0)
OXYHGB MFR BLDA: 96.9 % (ref 94–98)
OXYHGB MFR BLDA: 97.1 % (ref 94–98)
OXYHGB MFR BLDA: 97.2 % (ref 94–98)
OXYHGB MFR BLDV: 75 % (ref 45–75)
PCO2 BLDA: 39 MM HG (ref 38–42)
PCO2 BLDA: 42 MM HG (ref 38–42)
PCO2 BLDA: 43 MM HG (ref 38–42)
PCO2 BLDV: 48 MM HG (ref 41–51)
PH BLDA: 7.34 PH (ref 7.38–7.42)
PH BLDA: 7.35 PH (ref 7.38–7.42)
PH BLDA: 7.36 PH (ref 7.38–7.42)
PH BLDV: 7.33 PH (ref 7.33–7.43)
PHOSPHATE SERPL-MCNC: 1.8 MG/DL (ref 2.5–4.9)
PHOSPHATE SERPL-MCNC: 3.1 MG/DL (ref 2.5–4.9)
PLATELET # BLD AUTO: 113 X10*3/UL (ref 150–450)
PLATELET # BLD AUTO: 160 X10*3/UL (ref 150–450)
PO2 BLDA: 102 MM HG (ref 85–95)
PO2 BLDA: 115 MM HG (ref 85–95)
PO2 BLDA: 163 MM HG (ref 85–95)
PO2 BLDV: 46 MM HG (ref 35–45)
POTASSIUM BLDA-SCNC: 3.8 MMOL/L (ref 3.5–5.3)
POTASSIUM BLDA-SCNC: 3.9 MMOL/L (ref 3.5–5.3)
POTASSIUM BLDA-SCNC: 4.4 MMOL/L (ref 3.5–5.3)
POTASSIUM BLDV-SCNC: 3.5 MMOL/L (ref 3.5–5.3)
POTASSIUM SERPL-SCNC: 3.7 MMOL/L (ref 3.5–5.3)
POTASSIUM SERPL-SCNC: 3.8 MMOL/L (ref 3.5–5.3)
RBC # BLD AUTO: 2.85 X10*6/UL (ref 4.5–5.9)
RBC # BLD AUTO: 3.23 X10*6/UL (ref 4.5–5.9)
SAO2 % BLDA: 98 % (ref 94–100)
SAO2 % BLDA: 98 % (ref 94–100)
SAO2 % BLDA: 99 % (ref 94–100)
SAO2 % BLDV: 77 % (ref 45–75)
SODIUM BLDA-SCNC: 135 MMOL/L (ref 136–145)
SODIUM BLDA-SCNC: 136 MMOL/L (ref 136–145)
SODIUM BLDA-SCNC: 136 MMOL/L (ref 136–145)
SODIUM BLDV-SCNC: 136 MMOL/L (ref 136–145)
SODIUM SERPL-SCNC: 138 MMOL/L (ref 136–145)
SODIUM SERPL-SCNC: 138 MMOL/L (ref 136–145)
WBC # BLD AUTO: 10.5 X10*3/UL (ref 4.4–11.3)
WBC # BLD AUTO: 14.8 X10*3/UL (ref 4.4–11.3)

## 2025-01-22 PROCEDURE — 97116 GAIT TRAINING THERAPY: CPT | Mod: GP

## 2025-01-22 PROCEDURE — 2020000001 HC ICU ROOM DAILY

## 2025-01-22 PROCEDURE — P9045 ALBUMIN (HUMAN), 5%, 250 ML: HCPCS | Mod: JZ,TB

## 2025-01-22 PROCEDURE — 2500000005 HC RX 250 GENERAL PHARMACY W/O HCPCS

## 2025-01-22 PROCEDURE — 2500000004 HC RX 250 GENERAL PHARMACY W/ HCPCS (ALT 636 FOR OP/ED): Performed by: NURSE PRACTITIONER

## 2025-01-22 PROCEDURE — 92610 EVALUATE SWALLOWING FUNCTION: CPT | Mod: GN

## 2025-01-22 PROCEDURE — 37799 UNLISTED PX VASCULAR SURGERY: CPT

## 2025-01-22 PROCEDURE — P9047 ALBUMIN (HUMAN), 25%, 50ML: HCPCS | Mod: JZ,TB

## 2025-01-22 PROCEDURE — 2500000004 HC RX 250 GENERAL PHARMACY W/ HCPCS (ALT 636 FOR OP/ED): Mod: JZ,TB

## 2025-01-22 PROCEDURE — 2500000005 HC RX 250 GENERAL PHARMACY W/O HCPCS: Performed by: NURSE PRACTITIONER

## 2025-01-22 PROCEDURE — P9045 ALBUMIN (HUMAN), 5%, 250 ML: HCPCS | Mod: JZ,TB | Performed by: NURSE PRACTITIONER

## 2025-01-22 PROCEDURE — 99291 CRITICAL CARE FIRST HOUR: CPT | Performed by: ANESTHESIOLOGY

## 2025-01-22 PROCEDURE — 82330 ASSAY OF CALCIUM: CPT

## 2025-01-22 PROCEDURE — 2500000002 HC RX 250 W HCPCS SELF ADMINISTERED DRUGS (ALT 637 FOR MEDICARE OP, ALT 636 FOR OP/ED)

## 2025-01-22 PROCEDURE — 2500000001 HC RX 250 WO HCPCS SELF ADMINISTERED DRUGS (ALT 637 FOR MEDICARE OP)

## 2025-01-22 PROCEDURE — 97161 PT EVAL LOW COMPLEX 20 MIN: CPT | Mod: GP

## 2025-01-22 PROCEDURE — 82435 ASSAY OF BLOOD CHLORIDE: CPT

## 2025-01-22 PROCEDURE — 2500000004 HC RX 250 GENERAL PHARMACY W/ HCPCS (ALT 636 FOR OP/ED): Performed by: PHARMACIST

## 2025-01-22 PROCEDURE — 2500000004 HC RX 250 GENERAL PHARMACY W/ HCPCS (ALT 636 FOR OP/ED)

## 2025-01-22 PROCEDURE — 82565 ASSAY OF CREATININE: CPT

## 2025-01-22 PROCEDURE — 71045 X-RAY EXAM CHEST 1 VIEW: CPT

## 2025-01-22 PROCEDURE — 71045 X-RAY EXAM CHEST 1 VIEW: CPT | Performed by: RADIOLOGY

## 2025-01-22 PROCEDURE — 93010 ELECTROCARDIOGRAM REPORT: CPT | Performed by: INTERNAL MEDICINE

## 2025-01-22 PROCEDURE — 99231 SBSQ HOSP IP/OBS SF/LOW 25: CPT | Performed by: STUDENT IN AN ORGANIZED HEALTH CARE EDUCATION/TRAINING PROGRAM

## 2025-01-22 PROCEDURE — 82947 ASSAY GLUCOSE BLOOD QUANT: CPT

## 2025-01-22 PROCEDURE — 99291 CRITICAL CARE FIRST HOUR: CPT

## 2025-01-22 PROCEDURE — 83735 ASSAY OF MAGNESIUM: CPT

## 2025-01-22 PROCEDURE — 93005 ELECTROCARDIOGRAM TRACING: CPT

## 2025-01-22 PROCEDURE — 85027 COMPLETE CBC AUTOMATED: CPT

## 2025-01-22 PROCEDURE — 82810 BLOOD GASES O2 SAT ONLY: CPT

## 2025-01-22 PROCEDURE — 2500000001 HC RX 250 WO HCPCS SELF ADMINISTERED DRUGS (ALT 637 FOR MEDICARE OP): Performed by: NURSE PRACTITIONER

## 2025-01-22 RX ORDER — HEPARIN SODIUM 5000 [USP'U]/ML
5000 INJECTION, SOLUTION INTRAVENOUS; SUBCUTANEOUS EVERY 8 HOURS
Status: DISCONTINUED | OUTPATIENT
Start: 2025-01-22 | End: 2025-02-04 | Stop reason: HOSPADM

## 2025-01-22 RX ORDER — ALBUMIN HUMAN 250 G/1000ML
SOLUTION INTRAVENOUS
Status: COMPLETED
Start: 2025-01-22 | End: 2025-01-22

## 2025-01-22 RX ORDER — MIDODRINE HYDROCHLORIDE 10 MG/1
10 TABLET ORAL EVERY 8 HOURS
Status: DISCONTINUED | OUTPATIENT
Start: 2025-01-22 | End: 2025-01-26

## 2025-01-22 RX ORDER — LAMOTRIGINE 200 MG/1
200 TABLET ORAL
Status: DISCONTINUED | OUTPATIENT
Start: 2025-01-22 | End: 2025-02-04 | Stop reason: HOSPADM

## 2025-01-22 RX ORDER — ALBUMIN HUMAN 50 G/1000ML
12.5 SOLUTION INTRAVENOUS ONCE
Status: COMPLETED | OUTPATIENT
Start: 2025-01-22 | End: 2025-01-22

## 2025-01-22 RX ORDER — ALBUMIN HUMAN 250 G/1000ML
25 SOLUTION INTRAVENOUS ONCE
Status: COMPLETED | OUTPATIENT
Start: 2025-01-22 | End: 2025-01-22

## 2025-01-22 RX ORDER — ARIPIPRAZOLE 10 MG/1
10 TABLET ORAL DAILY
Status: DISCONTINUED | OUTPATIENT
Start: 2025-01-22 | End: 2025-02-04 | Stop reason: HOSPADM

## 2025-01-22 RX ORDER — NOREPINEPHRINE BITARTRATE/D5W 8 MG/250ML
0-.1 PLASTIC BAG, INJECTION (ML) INTRAVENOUS CONTINUOUS
Status: DISCONTINUED | OUTPATIENT
Start: 2025-01-22 | End: 2025-01-22

## 2025-01-22 RX ORDER — LAMOTRIGINE 150 MG/1
150 TABLET ORAL NIGHTLY
Status: DISCONTINUED | OUTPATIENT
Start: 2025-01-22 | End: 2025-02-04 | Stop reason: HOSPADM

## 2025-01-22 RX ORDER — NOREPINEPHRINE BITARTRATE/D5W 8 MG/250ML
0-.1 PLASTIC BAG, INJECTION (ML) INTRAVENOUS CONTINUOUS
Status: DISCONTINUED | OUTPATIENT
Start: 2025-01-22 | End: 2025-01-23

## 2025-01-22 RX ORDER — ALBUMIN HUMAN 250 G/1000ML
25 SOLUTION INTRAVENOUS EVERY 6 HOURS
Status: DISPENSED | OUTPATIENT
Start: 2025-01-22 | End: 2025-01-23

## 2025-01-22 RX ORDER — ACETAMINOPHEN 500 MG
5 TABLET ORAL NIGHTLY
Status: DISCONTINUED | OUTPATIENT
Start: 2025-01-22 | End: 2025-01-30

## 2025-01-22 RX ORDER — ACETAMINOPHEN 325 MG/1
975 TABLET ORAL EVERY 6 HOURS
Status: DISCONTINUED | OUTPATIENT
Start: 2025-01-22 | End: 2025-02-04 | Stop reason: HOSPADM

## 2025-01-22 RX ORDER — MIRTAZAPINE 15 MG/1
15 TABLET, FILM COATED ORAL NIGHTLY
Status: DISCONTINUED | OUTPATIENT
Start: 2025-01-22 | End: 2025-02-04 | Stop reason: HOSPADM

## 2025-01-22 RX ORDER — AMIODARONE HYDROCHLORIDE 200 MG/1
200 TABLET ORAL DAILY
Status: DISCONTINUED | OUTPATIENT
Start: 2025-01-22 | End: 2025-02-04 | Stop reason: HOSPADM

## 2025-01-22 RX ORDER — INSULIN LISPRO 100 [IU]/ML
0-15 INJECTION, SOLUTION INTRAVENOUS; SUBCUTANEOUS
Status: DISCONTINUED | OUTPATIENT
Start: 2025-01-22 | End: 2025-01-26

## 2025-01-22 RX ORDER — HYDROMORPHONE HYDROCHLORIDE 0.2 MG/ML
0.2 INJECTION INTRAMUSCULAR; INTRAVENOUS; SUBCUTANEOUS EVERY 4 HOURS PRN
Status: DISCONTINUED | OUTPATIENT
Start: 2025-01-22 | End: 2025-01-25

## 2025-01-22 RX ORDER — COLCHICINE 0.6 MG/1
0.6 TABLET ORAL DAILY
Status: DISCONTINUED | OUTPATIENT
Start: 2025-01-22 | End: 2025-02-01

## 2025-01-22 RX ORDER — HYDROMORPHONE HYDROCHLORIDE 0.2 MG/ML
0.2 INJECTION INTRAMUSCULAR; INTRAVENOUS; SUBCUTANEOUS EVERY 2 HOUR PRN
Status: DISCONTINUED | OUTPATIENT
Start: 2025-01-22 | End: 2025-01-22

## 2025-01-22 RX ADMIN — MIDODRINE HYDROCHLORIDE 10 MG: 10 TABLET ORAL at 23:40

## 2025-01-22 RX ADMIN — POLYETHYLENE GLYCOL 3350 17 G: 17 POWDER, FOR SOLUTION ORAL at 21:36

## 2025-01-22 RX ADMIN — Medication 5 MG: at 21:37

## 2025-01-22 RX ADMIN — ALBUMIN HUMAN 25 G: 0.25 SOLUTION INTRAVENOUS at 22:32

## 2025-01-22 RX ADMIN — MIDODRINE HYDROCHLORIDE 10 MG: 10 TABLET ORAL at 16:01

## 2025-01-22 RX ADMIN — COLCHICINE 0.6 MG: 0.6 TABLET ORAL at 09:47

## 2025-01-22 RX ADMIN — ACETAMINOPHEN 1000 MG: 10 INJECTION INTRAVENOUS at 00:31

## 2025-01-22 RX ADMIN — HEPARIN SODIUM 5000 UNITS: 5000 INJECTION, SOLUTION INTRAVENOUS; SUBCUTANEOUS at 09:47

## 2025-01-22 RX ADMIN — SODIUM PHOSPHATE, MONOBASIC, MONOHYDRATE AND SODIUM PHOSPHATE, DIBASIC, ANHYDROUS 21 MMOL: 142; 276 INJECTION, SOLUTION INTRAVENOUS at 03:23

## 2025-01-22 RX ADMIN — ATORVASTATIN CALCIUM 80 MG: 80 TABLET, FILM COATED ORAL at 21:37

## 2025-01-22 RX ADMIN — HYDROMORPHONE HYDROCHLORIDE 0.2 MG: 0.2 INJECTION, SOLUTION INTRAMUSCULAR; INTRAVENOUS; SUBCUTANEOUS at 00:31

## 2025-01-22 RX ADMIN — LAMOTRIGINE 200 MG: 150 TABLET ORAL at 16:02

## 2025-01-22 RX ADMIN — ALBUMIN HUMAN 25 G: 0.25 SOLUTION INTRAVENOUS at 16:15

## 2025-01-22 RX ADMIN — ALBUMIN HUMAN 12.5 G: 0.05 INJECTION, SOLUTION INTRAVENOUS at 09:47

## 2025-01-22 RX ADMIN — ACETAMINOPHEN 650 MG: 325 TABLET ORAL at 09:47

## 2025-01-22 RX ADMIN — AMIODARONE HYDROCHLORIDE 200 MG: 200 TABLET ORAL at 11:48

## 2025-01-22 RX ADMIN — NOREPINEPHRINE BITARTRATE 0.02 MCG/KG/MIN: 8 INJECTION, SOLUTION INTRAVENOUS at 21:58

## 2025-01-22 RX ADMIN — ALBUMIN HUMAN 25 G: 250 SOLUTION INTRAVENOUS at 14:47

## 2025-01-22 RX ADMIN — PANTOPRAZOLE SODIUM 40 MG: 40 INJECTION, POWDER, FOR SOLUTION INTRAVENOUS at 06:05

## 2025-01-22 RX ADMIN — CEFAZOLIN SODIUM 2 G: 2 INJECTION, SOLUTION INTRAVENOUS at 19:49

## 2025-01-22 RX ADMIN — POTASSIUM CHLORIDE 20 MEQ: 14.9 INJECTION, SOLUTION INTRAVENOUS at 02:19

## 2025-01-22 RX ADMIN — SODIUM CHLORIDE, POTASSIUM CHLORIDE, SODIUM LACTATE AND CALCIUM CHLORIDE 250 ML: 600; 310; 30; 20 INJECTION, SOLUTION INTRAVENOUS at 18:29

## 2025-01-22 RX ADMIN — ACETAMINOPHEN 975 MG: 325 TABLET ORAL at 16:01

## 2025-01-22 RX ADMIN — ALBUMIN HUMAN 12.5 G: 0.05 INJECTION, SOLUTION INTRAVENOUS at 12:31

## 2025-01-22 RX ADMIN — ARIPIPRAZOLE 10 MG: 5 TABLET ORAL at 11:48

## 2025-01-22 RX ADMIN — ASPIRIN 81 MG CHEWABLE TABLET 81 MG: 81 TABLET CHEWABLE at 09:48

## 2025-01-22 RX ADMIN — Medication 6 L/MIN: at 00:25

## 2025-01-22 RX ADMIN — OXYCODONE HYDROCHLORIDE 10 MG: 10 TABLET ORAL at 13:24

## 2025-01-22 RX ADMIN — POTASSIUM CHLORIDE 40 MEQ: 1500 TABLET, EXTENDED RELEASE ORAL at 17:26

## 2025-01-22 RX ADMIN — VANCOMYCIN HYDROCHLORIDE 1500 MG: 5 INJECTION, POWDER, LYOPHILIZED, FOR SOLUTION INTRAVENOUS at 21:40

## 2025-01-22 RX ADMIN — CEFAZOLIN SODIUM 2 G: 2 INJECTION, SOLUTION INTRAVENOUS at 12:31

## 2025-01-22 RX ADMIN — HEPARIN SODIUM 5000 UNITS: 5000 INJECTION, SOLUTION INTRAVENOUS; SUBCUTANEOUS at 23:40

## 2025-01-22 RX ADMIN — CEFAZOLIN SODIUM 2 G: 2 INJECTION, SOLUTION INTRAVENOUS at 03:25

## 2025-01-22 RX ADMIN — ALBUMIN HUMAN 25 G: 0.25 SOLUTION INTRAVENOUS at 14:47

## 2025-01-22 RX ADMIN — SENNOSIDES AND DOCUSATE SODIUM 2 TABLET: 50; 8.6 TABLET ORAL at 09:47

## 2025-01-22 RX ADMIN — SENNOSIDES AND DOCUSATE SODIUM 2 TABLET: 50; 8.6 TABLET ORAL at 21:37

## 2025-01-22 RX ADMIN — ACETAMINOPHEN 1000 MG: 10 INJECTION INTRAVENOUS at 06:05

## 2025-01-22 RX ADMIN — HEPARIN SODIUM 5000 UNITS: 5000 INJECTION, SOLUTION INTRAVENOUS; SUBCUTANEOUS at 16:02

## 2025-01-22 RX ADMIN — POLYETHYLENE GLYCOL 3350 17 G: 17 POWDER, FOR SOLUTION ORAL at 09:47

## 2025-01-22 RX ADMIN — ALBUMIN HUMAN 12.5 G: 0.05 INJECTION, SOLUTION INTRAVENOUS at 02:00

## 2025-01-22 RX ADMIN — OXYCODONE 5 MG: 5 TABLET ORAL at 19:49

## 2025-01-22 RX ADMIN — POTASSIUM CHLORIDE 20 MEQ: 14.9 INJECTION, SOLUTION INTRAVENOUS at 01:16

## 2025-01-22 RX ADMIN — HYDROMORPHONE HYDROCHLORIDE 0.2 MG: 0.2 INJECTION, SOLUTION INTRAMUSCULAR; INTRAVENOUS; SUBCUTANEOUS at 04:52

## 2025-01-22 RX ADMIN — MIRTAZAPINE 15 MG: 15 TABLET, FILM COATED ORAL at 21:38

## 2025-01-22 RX ADMIN — HYDROMORPHONE HYDROCHLORIDE 0.2 MG: 0.2 INJECTION, SOLUTION INTRAMUSCULAR; INTRAVENOUS; SUBCUTANEOUS at 21:27

## 2025-01-22 RX ADMIN — LAMOTRIGINE 150 MG: 150 TABLET ORAL at 21:38

## 2025-01-22 SDOH — HEALTH STABILITY: PHYSICAL HEALTH: ON AVERAGE, HOW MANY DAYS PER WEEK DO YOU ENGAGE IN MODERATE TO STRENUOUS EXERCISE (LIKE A BRISK WALK)?: 0 DAYS

## 2025-01-22 SDOH — SOCIAL STABILITY: SOCIAL INSECURITY
WITHIN THE LAST YEAR, HAVE YOU BEEN KICKED, HIT, SLAPPED, OR OTHERWISE PHYSICALLY HURT BY YOUR PARTNER OR EX-PARTNER?: NO

## 2025-01-22 SDOH — SOCIAL STABILITY: SOCIAL INSECURITY: WITHIN THE LAST YEAR, HAVE YOU BEEN AFRAID OF YOUR PARTNER OR EX-PARTNER?: NO

## 2025-01-22 SDOH — SOCIAL STABILITY: SOCIAL INSECURITY: ARE YOU MARRIED, WIDOWED, DIVORCED, SEPARATED, NEVER MARRIED, OR LIVING WITH A PARTNER?: LIVING WITH PARTNER

## 2025-01-22 SDOH — SOCIAL STABILITY: SOCIAL NETWORK
IN A TYPICAL WEEK, HOW MANY TIMES DO YOU TALK ON THE PHONE WITH FAMILY, FRIENDS, OR NEIGHBORS?: MORE THAN THREE TIMES A WEEK

## 2025-01-22 SDOH — ECONOMIC STABILITY: INCOME INSECURITY: IN THE PAST 12 MONTHS HAS THE ELECTRIC, GAS, OIL, OR WATER COMPANY THREATENED TO SHUT OFF SERVICES IN YOUR HOME?: NO

## 2025-01-22 SDOH — HEALTH STABILITY: MENTAL HEALTH: HOW MANY DRINKS CONTAINING ALCOHOL DO YOU HAVE ON A TYPICAL DAY WHEN YOU ARE DRINKING?: 1 OR 2

## 2025-01-22 SDOH — ECONOMIC STABILITY: FOOD INSECURITY: WITHIN THE PAST 12 MONTHS, THE FOOD YOU BOUGHT JUST DIDN'T LAST AND YOU DIDN'T HAVE MONEY TO GET MORE.: NEVER TRUE

## 2025-01-22 SDOH — SOCIAL STABILITY: SOCIAL NETWORK: HOW OFTEN DO YOU ATTEND CHURCH OR RELIGIOUS SERVICES?: NEVER

## 2025-01-22 SDOH — SOCIAL STABILITY: SOCIAL INSECURITY: DOES ANYONE TRY TO KEEP YOU FROM HAVING/CONTACTING OTHER FRIENDS OR DOING THINGS OUTSIDE YOUR HOME?: NO

## 2025-01-22 SDOH — SOCIAL STABILITY: SOCIAL INSECURITY
WITHIN THE LAST YEAR, HAVE YOU BEEN RAPED OR FORCED TO HAVE ANY KIND OF SEXUAL ACTIVITY BY YOUR PARTNER OR EX-PARTNER?: NO

## 2025-01-22 SDOH — HEALTH STABILITY: MENTAL HEALTH
DO YOU FEEL STRESS - TENSE, RESTLESS, NERVOUS, OR ANXIOUS, OR UNABLE TO SLEEP AT NIGHT BECAUSE YOUR MIND IS TROUBLED ALL THE TIME - THESE DAYS?: ONLY A LITTLE

## 2025-01-22 SDOH — HEALTH STABILITY: MENTAL HEALTH: HOW OFTEN DO YOU HAVE A DRINK CONTAINING ALCOHOL?: 2-3 TIMES A WEEK

## 2025-01-22 SDOH — SOCIAL STABILITY: SOCIAL INSECURITY: DO YOU FEEL ANYONE HAS EXPLOITED OR TAKEN ADVANTAGE OF YOU FINANCIALLY OR OF YOUR PERSONAL PROPERTY?: NO

## 2025-01-22 SDOH — SOCIAL STABILITY: SOCIAL INSECURITY: HAVE YOU HAD ANY THOUGHTS OF HARMING ANYONE ELSE?: NO

## 2025-01-22 SDOH — HEALTH STABILITY: PHYSICAL HEALTH
HOW OFTEN DO YOU NEED TO HAVE SOMEONE HELP YOU WHEN YOU READ INSTRUCTIONS, PAMPHLETS, OR OTHER WRITTEN MATERIAL FROM YOUR DOCTOR OR PHARMACY?: RARELY

## 2025-01-22 SDOH — SOCIAL STABILITY: SOCIAL INSECURITY: HAVE YOU HAD THOUGHTS OF HARMING ANYONE ELSE?: NO

## 2025-01-22 SDOH — SOCIAL STABILITY: SOCIAL NETWORK
DO YOU BELONG TO ANY CLUBS OR ORGANIZATIONS SUCH AS CHURCH GROUPS, UNIONS, FRATERNAL OR ATHLETIC GROUPS, OR SCHOOL GROUPS?: NO

## 2025-01-22 SDOH — SOCIAL STABILITY: SOCIAL INSECURITY: WITHIN THE LAST YEAR, HAVE YOU BEEN HUMILIATED OR EMOTIONALLY ABUSED IN OTHER WAYS BY YOUR PARTNER OR EX-PARTNER?: NO

## 2025-01-22 SDOH — HEALTH STABILITY: PHYSICAL HEALTH: ON AVERAGE, HOW MANY MINUTES DO YOU ENGAGE IN EXERCISE AT THIS LEVEL?: 0 MIN

## 2025-01-22 SDOH — ECONOMIC STABILITY: FOOD INSECURITY: WITHIN THE PAST 12 MONTHS, YOU WORRIED THAT YOUR FOOD WOULD RUN OUT BEFORE YOU GOT THE MONEY TO BUY MORE.: NEVER TRUE

## 2025-01-22 SDOH — SOCIAL STABILITY: SOCIAL INSECURITY: HAS ANYONE EVER THREATENED TO HURT YOUR FAMILY OR YOUR PETS?: NO

## 2025-01-22 SDOH — SOCIAL STABILITY: SOCIAL NETWORK: HOW OFTEN DO YOU GET TOGETHER WITH FRIENDS OR RELATIVES?: MORE THAN THREE TIMES A WEEK

## 2025-01-22 SDOH — SOCIAL STABILITY: SOCIAL INSECURITY: ARE YOU OR HAVE YOU BEEN THREATENED OR ABUSED PHYSICALLY, EMOTIONALLY, OR SEXUALLY BY ANYONE?: NO

## 2025-01-22 SDOH — SOCIAL STABILITY: SOCIAL INSECURITY: ARE THERE ANY APPARENT SIGNS OF INJURIES/BEHAVIORS THAT COULD BE RELATED TO ABUSE/NEGLECT?: NO

## 2025-01-22 SDOH — SOCIAL STABILITY: SOCIAL INSECURITY: ABUSE: ADULT

## 2025-01-22 SDOH — HEALTH STABILITY: MENTAL HEALTH: HOW OFTEN DO YOU HAVE SIX OR MORE DRINKS ON ONE OCCASION?: NEVER

## 2025-01-22 SDOH — SOCIAL STABILITY: SOCIAL NETWORK: HOW OFTEN DO YOU ATTEND MEETINGS OF THE CLUBS OR ORGANIZATIONS YOU BELONG TO?: MORE THAN 4 TIMES PER YEAR

## 2025-01-22 SDOH — SOCIAL STABILITY: SOCIAL INSECURITY: DO YOU FEEL UNSAFE GOING BACK TO THE PLACE WHERE YOU ARE LIVING?: NO

## 2025-01-22 ASSESSMENT — COGNITIVE AND FUNCTIONAL STATUS - GENERAL
STANDING UP FROM CHAIR USING ARMS: A LITTLE
MOVING FROM LYING ON BACK TO SITTING ON SIDE OF FLAT BED WITH BEDRAILS: A LITTLE
MOVING TO AND FROM BED TO CHAIR: A LITTLE
CLIMB 3 TO 5 STEPS WITH RAILING: TOTAL
TURNING FROM BACK TO SIDE WHILE IN FLAT BAD: A LITTLE
MOBILITY SCORE: 16
WALKING IN HOSPITAL ROOM: A LITTLE

## 2025-01-22 ASSESSMENT — PAIN SCALES - GENERAL
PAINLEVEL_OUTOF10: 4
PAINLEVEL_OUTOF10: 10 - WORST POSSIBLE PAIN
PAINLEVEL_OUTOF10: 4
PAINLEVEL_OUTOF10: 4
PAINLEVEL_OUTOF10: 3
PAINLEVEL_OUTOF10: 4
PAINLEVEL_OUTOF10: 7
PAINLEVEL_OUTOF10: 10 - WORST POSSIBLE PAIN
PAINLEVEL_OUTOF10: 7
PAINLEVEL_OUTOF10: 5 - MODERATE PAIN
PAINLEVEL_OUTOF10: 2
PAINLEVEL_OUTOF10: 5 - MODERATE PAIN
PAINLEVEL_OUTOF10: 7
PAINLEVEL_OUTOF10: 5 - MODERATE PAIN
PAINLEVEL_OUTOF10: 7
PAINLEVEL_OUTOF10: 9

## 2025-01-22 ASSESSMENT — ACTIVITIES OF DAILY LIVING (ADL)
LACK_OF_TRANSPORTATION: NO
FEEDING YOURSELF: INDEPENDENT
LACK_OF_TRANSPORTATION: NO
HEARING - LEFT EAR: FUNCTIONAL
DRESSING YOURSELF: INDEPENDENT
WALKS IN HOME: INDEPENDENT
ADL_ASSISTANCE: INDEPENDENT
ADLS_ADDRESSED: NO
JUDGMENT_ADEQUATE_SAFELY_COMPLETE_DAILY_ACTIVITIES: YES
BATHING: INDEPENDENT
HEARING - RIGHT EAR: FUNCTIONAL
GROOMING: INDEPENDENT
TOILETING: INDEPENDENT
ADEQUATE_TO_COMPLETE_ADL: YES
PATIENT'S MEMORY ADEQUATE TO SAFELY COMPLETE DAILY ACTIVITIES?: YES

## 2025-01-22 ASSESSMENT — PATIENT HEALTH QUESTIONNAIRE - PHQ9
1. LITTLE INTEREST OR PLEASURE IN DOING THINGS: NOT AT ALL
2. FEELING DOWN, DEPRESSED OR HOPELESS: NOT AT ALL
SUM OF ALL RESPONSES TO PHQ9 QUESTIONS 1 & 2: 0

## 2025-01-22 ASSESSMENT — PAIN - FUNCTIONAL ASSESSMENT
PAIN_FUNCTIONAL_ASSESSMENT: 0-10

## 2025-01-22 ASSESSMENT — LIFESTYLE VARIABLES
HOW OFTEN DO YOU HAVE A DRINK CONTAINING ALCOHOL: 2-3 TIMES A WEEK
SKIP TO QUESTIONS 9-10: 1
HOW MANY STANDARD DRINKS CONTAINING ALCOHOL DO YOU HAVE ON A TYPICAL DAY: 1 OR 2
HOW OFTEN DO YOU HAVE 6 OR MORE DRINKS ON ONE OCCASION: NEVER
PRESCIPTION_ABUSE_PAST_12_MONTHS: NO
AUDIT-C TOTAL SCORE: 3
SUBSTANCE_ABUSE_PAST_12_MONTHS: NO
AUDIT-C TOTAL SCORE: 3
AUDIT-C TOTAL SCORE: 3
SKIP TO QUESTIONS 9-10: 1

## 2025-01-22 ASSESSMENT — PAIN DESCRIPTION - ORIENTATION: ORIENTATION: LEFT

## 2025-01-22 ASSESSMENT — PAIN DESCRIPTION - LOCATION: LOCATION: CHEST

## 2025-01-22 NOTE — PROGRESS NOTES
Physical Therapy    Physical Therapy Evaluation & Treatment    Patient Name: Bret Valerio  MRN: [id number]  Department: [facility]  Room: 16/16-A  Today's Date: 1/22/2025   Time Calculation  Start Time: 1140  Stop Time: 1216  Time Calculation (min): 36 min    Assessment/Plan   PT Assessment  PT Assessment Results: Decreased strength, Decreased endurance, Impaired balance, Decreased mobility, Pain  Rehab Prognosis: Good  Barriers to Discharge Home: Physical needs  Physical Needs: High falls risk due to function or environment  Evaluation/Treatment Tolerance: Treatment limited secondary to medical complications (Comment)  Medical Staff Made Aware: Yes  End of Session Communication: Bedside nurse  Assessment Comment: Pt demonstrated ability to complete bed mobility, sit<>stand transfer and ambulation with thoracic walker ~30ft x1.  Limited mobility d/t hypotension.  End of Session Patient Position: Bed, 3 rail up, Alarm off, not on at start of session   IP OR SWING BED PT PLAN  Inpatient or Swing Bed: Inpatient  PT Plan  Treatment/Interventions: Bed mobility, Transfer training, Gait training, Stair training, Balance training, Strengthening, Endurance training, Therapeutic activity, Therapeutic exercise  PT Plan: Ongoing PT  PT Frequency: 5 times per week  PT Discharge Recommendations: Low intensity level of continued care  Equipment Recommended upon Discharge: Wheeled walker  PT Recommended Transfer Status: Assist x1, Assistive device  PT - OK to Discharge: Yes    Subjective     General Visit Information:  General  Reason for Referral: MIDCAB x2 LIMA to LAD, PRINCE to Diag  Referred By: Dr. Carrasco  Past Medical History Relevant to Rehab: CAD s/p PCI (diag; 2001), stage C systolic HF/ICM/HFrEF with moderate LV dysfunction currently without an ICD, pAF on AAD and DOAC therapies (amiodarone 200mg daily, eliquis 5mg BID), and remote h/o polio at 18mos.  Family/Caregiver Present: Yes  Caregiver Feedback: Hesham present and  supportive  Prior to Session Communication: Bedside nurse  Patient Position Received: Bed, 3 rail up, Alarm off, not on at start of session  Preferred Learning Style: auditory, verbal  General Comment: Pt awake, alert and willing to participate in PT session  Home Living:  Home Living  Type of Home: Apartment  Lives With: Significant other  Home Layout:  (1 FABIOLA, bed/bath - 1st floor, tub/shower with a bench)  Prior Level of Function:  Prior Function Per Pt/Caregiver Report  Level of [place]: Independent with ADLs and functional transfers, Independent with homemaking with ambulation  ADL Assistance: Independent  Homemaking Assistance: Independent  Ambulatory Assistance: Independent  Vocational: Retired  Prior Function Comments: (-) drives, (-) falls  Precautions:  Precautions  Hearing/Visual Limitations: WFL  Medical Precautions: Cardiac precautions, Fall precautions, Oxygen therapy device and L/min, Chest tube (4L, CT x2)  Precautions Comment: MAP 70-90, SpO2 >92%     01/22/25 1140 01/22/25 1216   Vital Signs   Vitals Session Pre PT Post PT   Heart Rate 74 73   Resp 16 21   SpO2 99 % 99 %   /50 (!) 105/45   MAP (mmHg) 73 66   BP Method Arterial line Arterial line   Patient Position Lying Lying   Vital Signs Comment  --  Sitting: MAP dropped to 58 however improved with time;  Standing/ambulation: MAP 62-68;  Mild symptoms throughout     Objective   Pain:  Pain Assessment  Pain Assessment: 0-10  0-10 (Numeric) Pain Score: 5 - Moderate pain  Pain Type: Surgical pain  Pain Location: Chest  Pain Orientation: Right  Cognition:  Cognition  Overall Cognitive Status: Within Functional Limits  Orientation Level: Oriented X4    General Assessments:  General Observation  General Observation: Tele, katelynn, CVP, sweeney, pain catheters, CVC     Activity Tolerance  Endurance: Tolerates less than 10 min exercise with changes in vital signs  Early Mobility/Exercise Safety Screen: Proceed with mobilization - No exclusion  criteria met    Sensation  Light Touch: No apparent deficits (Post-op)    Strength  Strength Comments: BLEs at least 3/5 shown through functional mobility;  R foot drop since a child d/t polio (no AFO utilized, education provided however).  Coordination  Movements are Fluid and Coordinated: Yes    Postural Control  Postural Control: Within Functional Limits    Static Sitting Balance  Static Sitting-Balance Support: Bilateral upper extremity supported, Feet supported  Static Sitting-Level of Assistance: Contact guard  Static Sitting-Comment/Number of Minutes: ~6 min; Kyphotic posture    Static Standing Balance  Static Standing-Balance Support: Bilateral upper extremity supported  Static Standing-Level of Assistance: Contact guard  Static Standing-Comment/Number of Minutes: Thoracic walker  Functional Assessments:  ADL  ADL's Addressed: No    Bed Mobility  Bed Mobility: Yes  Bed Mobility 1  Bed Mobility 1: Supine to sitting  Level of Assistance 1: Minimum assistance (x1)  Bed Mobility Comments 1: HOB elevated, assistance with advancing trunk upright  Bed Mobility 2  Bed Mobility  2: Sitting to supine  Level of Assistance 2: Minimum assistance (x1)  Bed Mobility Comments 2: HOB elevated, assistance with advancing LEs onto the bed  Bed Mobility 3  Bed Mobility 3:  (Boost towards HOB)  Level of Assistance 3: Dependent, +2  Bed Mobility Comments 3: HOB flat, TAPs utilized    Transfers  Transfer: Yes  Transfer 1  Transfer From 1: Sit to, Stand to  Transfer to 1: Sit, Stand  Technique 1: Sit to stand, Stand to sit  Transfer Device 1: Thoracic walker  Transfer Level of Assistance 1: Contact guard  Trials/Comments 1: Cues for hand placement and proper use of AD    Ambulation/Gait Training  Ambulation/Gait Training Performed: Yes (Refer to treatment section for additional information)    Stairs  Stairs: No  Extremity/Trunk Assessments:  RUE   RUE : Within Functional Limits  LUE   LUE: Within Functional Limits  RLE   RLE :  Within Functional Limits  LLE   LLE : Within Functional Limits  Treatments:  Therapeutic Exercise  Therapeutic Exercise Performed: Yes  Therapeutic Exercise Activity 1: 1x10 reps of incentive spirometer: </=500    Therapeutic Activity  Therapeutic Activity Performed: Yes  Therapeutic Activity 1: Increased time for skilled ICU line/tube management for safe functional mobility.    Ambulation/Gait Training 1  Surface 1: Level tile  Device 1: Thoracic walker  Assistance 1: Contact guard  Comments/Distance (ft) 1: ~30ft (Frequent standing rest breaks d/t hemodyanmic monitoring.  Low MAP with ambulation.)  Outcome Measures:  [facility] Basic Mobility  Turning from your back to your side while in a flat bed without using bedrails: A little  Moving from lying on your back to sitting on the side of a flat bed without using bedrails: A little  Moving to and from bed to chair (including a wheelchair): A little  Standing up from a chair using your arms (e.g. wheelchair or bedside chair): A little  To walk in hospital room: A little  Climbing 3-5 steps with railing: Total  Basic Mobility - Total Score: 16    FSS-ICU  Ambulation: Walks <50 feet with any assistance x1 or walks any distance with assistance x2 people  Rolling: Minimal assistance (performs 75% or more of task)  Sitting: Supervision or set-up only  Transfer Sit-to-Stand: Supervision or set-up only  Transfer Supine-to-Sit: Minimal assistance (performs 75% or more of task)  Total Score: 19    Early Mobility/Exercise Safety Screen: Proceed with mobilization - No exclusion criteria met  ICU Mobility Scale: Walking with assistance of 1 person [8]    Encounter Problems       Encounter Problems (Active)       Balance       Pt will demonstrate ability to complete standing static/dynamic balance activities with unilateral UE support and no LOB for increase in safety up D/C.  (Progressing)       Start:  01/22/25    Expected End:  02/05/25               Mobility       Pt will  demonstrated ability to ambulate >/=150ft with proper form and no balance deficits for safe home going.   (Progressing)       Start:  01/22/25    Expected End:  02/05/25            Pt will demonstrate ability to ascend/descend 2 stairs with unilateral rail and no balance deficits for safe home going.  (Progressing)       Start:  01/22/25    Expected End:  02/05/25               PT Transfers       Pt will demonstrated ability to complete bed mobility and sit<>stand transfers without assistance and use of assistive device to safely return home.  (Progressing)       Start:  01/22/25    Expected End:  02/05/25                   Education Documentation  Precautions, taught by Roseann Costa PT at 1/22/2025  3:17 PM.  Learner: Patient  Readiness: Acceptance  Method: Explanation, Demonstration  Response: Demonstrated Understanding, Verbalizes Understanding    Body Mechanics, taught by Roseann Costa PT at 1/22/2025  3:17 PM.  Learner: Patient  Readiness: Acceptance  Method: Explanation, Demonstration  Response: Demonstrated Understanding, Verbalizes Understanding    Mobility Training, taught by Roseann Costa PT at 1/22/2025  3:17 PM.  Learner: Patient  Readiness: Acceptance  Method: Explanation, Demonstration  Response: Demonstrated Understanding, Verbalizes Understanding    Education Comments  No comments found.

## 2025-01-22 NOTE — CARE PLAN
The patient's goals for the shift include Eat some food    The clinical goals for the shift include Wean off Levo and maintain MAP >65    Patient has been out of bed twice today.  Passed swallow eval and has had a full lunch this afternoon. Blood pressure did drop one hour after 10 mg of oxycodone given for surgical pain. Levo restarted to keep MAP.65.

## 2025-01-22 NOTE — CARE PLAN
The patient's goals for the shift include      The clinical goals for the shift include      Problem: Safety - Medical Restraint  Goal: Remains free of injury from restraints (Restraint for Interference with Medical Device)  Outcome: Progressing  Goal: Free from restraint(s) (Restraint for Interference with Medical Device)  Outcome: Progressing

## 2025-01-22 NOTE — PROGRESS NOTES
Acute Pain Service    Parihs Valerio is a 74 y.o. year old male patient who presents for Procedure(s):  MIDCAB x 2, ROBOT-ASSISTED; RADIAL with Sofia Carrasco MD on 1/21/2025. Acute Pain consulted for assistance with pain control.     Postop Pain HPI -   Palliative: relieved with IV analgesics and regional local anesthetics  Provocative: movement  Quality:  burning and aching  Radiation:  none  Severity:  5/10  Timing: constant    24-HOUR OPIOID CONSUMPTION:  Dilaudid 0.6mg    Scheduled medications  acetaminophen, 1,000 mg, intravenous, q6h JUAN  acetaminophen, 650 mg, oral, q6h  aspirin, 81 mg, oral, Daily  atorvastatin, 80 mg, oral, Nightly  ceFAZolin, 2 g, intravenous, q8h  colchicine, 0.6 mg, oral, Daily  heparin, 5,000 Units, subcutaneous, q8h  insulin lispro, 0-15 Units, subcutaneous, Before meals & nightly  pantoprazole, 40 mg, oral, Daily before breakfast  polyethylene glycol, 17 g, oral, BID  sennosides-docusate sodium, 2 tablet, oral, BID  vancomycin, 1,500 mg, intravenous, q24h      Continuous medications  ropivacaine (PF) in NS cmpd, 10 mL/hr      PRN medications  PRN medications: calcium gluconate, calcium gluconate, dextrose **OR** glucagon, HYDROmorphone, magnesium sulfate, magnesium sulfate, naloxone, ondansetron **OR** ondansetron, oxyCODONE, oxyCODONE, oxygen, potassium chloride CR **OR** potassium chloride, potassium chloride CR **OR** potassium chloride, potassium chloride, potassium chloride, vancomycin     Physical Exam:  Constitutional:  no distress, alert and cooperative  Eyes: clear sclera  Head/Neck: No apparent injury, trachea midline  Respiratory/Thorax: Patent airways, thorax symmetric, breathing comfortably  Cardiovascular: no pitting edema  Gastrointestinal: Nondistended  Musculoskeletal: ROM intact  Extremities: no clubbing  Neurological: alert, lara x4  Psychological: Appropriate affect    Results for orders placed or performed during the hospital encounter of 01/21/25 (from the  past 24 hours)   ACTIVATED CLOTTING TIME HIGH   Result Value Ref Range    POCT Activated Clotting Time High Range 391 (H) 82 - 174 sec   ACTIVATED CLOTTING TIME HIGH   Result Value Ref Range    POCT Activated Clotting Time High Range 340 (H) 82 - 174 sec   Blood Gas Arterial Full Panel Unsolicited   Result Value Ref Range    POCT pH, Arterial 7.33 (L) 7.38 - 7.42 pH    POCT pCO2, Arterial 44 (H) 38 - 42 mm Hg    POCT pO2, Arterial 257 (H) 85 - 95 mm Hg    POCT SO2, Arterial 99 94 - 100 %    POCT Oxy Hemoglobin, Arterial 97.4 94.0 - 98.0 %    POCT Hematocrit Calculated, Arterial 32.0 (L) 41.0 - 52.0 %    POCT Sodium, Arterial 138 136 - 145 mmol/L    POCT Potassium, Arterial 3.4 (L) 3.5 - 5.3 mmol/L    POCT Chloride, Arterial 106 98 - 107 mmol/L    POCT Ionized Calcium, Arterial 1.09 (L) 1.10 - 1.33 mmol/L    POCT Glucose, Arterial 142 (H) 74 - 99 mg/dL    POCT Lactate, Arterial 0.8 0.4 - 2.0 mmol/L    POCT Base Excess, Arterial -2.7 (L) -2.0 - 3.0 mmol/L    POCT HCO3 Calculated, Arterial 23.2 22.0 - 26.0 mmol/L    POCT Hemoglobin, Arterial 10.6 (L) 13.5 - 17.5 g/dL    POCT Anion Gap, Arterial 12 10 - 25 mmo/L    Patient Temperature 37.0 degrees Celsius    FiO2 100 %   Coox Panel, Arterial Unsolicited   Result Value Ref Range    POCT Hemoglobin, Arterial 10.6 (L) 13.5 - 17.5 g/dL    POCT Oxy Hemoglobin, Arterial 97.4 94.0 - 98.0 %    POCT Carboxyhemoglobin, Arterial 0.3 %    POCT Methemoglobin, Arterial 0.9 0.0 - 1.5 %    POCT Deoxy Hemoglobin, Arterial 1.4 0.0 - 5.0 %   ACTIVATED CLOTTING TIME HIGH   Result Value Ref Range    POCT Activated Clotting Time High Range 337 (H) 82 - 174 sec   Blood Gas Arterial Full Panel Unsolicited   Result Value Ref Range    POCT pH, Arterial 7.36 (L) 7.38 - 7.42 pH    POCT pCO2, Arterial 40 38 - 42 mm Hg    POCT pO2, Arterial 278 (H) 85 - 95 mm Hg    POCT SO2, Arterial 99 94 - 100 %    POCT Oxy Hemoglobin, Arterial 98.1 (H) 94.0 - 98.0 %    POCT Hematocrit Calculated, Arterial 32.0  (L) 41.0 - 52.0 %    POCT Sodium, Arterial 137 136 - 145 mmol/L    POCT Potassium, Arterial 3.2 (L) 3.5 - 5.3 mmol/L    POCT Chloride, Arterial 107 98 - 107 mmol/L    POCT Ionized Calcium, Arterial 1.05 (L) 1.10 - 1.33 mmol/L    POCT Glucose, Arterial 153 (H) 74 - 99 mg/dL    POCT Lactate, Arterial 1.3 0.4 - 2.0 mmol/L    POCT Base Excess, Arterial -2.7 (L) -2.0 - 3.0 mmol/L    POCT HCO3 Calculated, Arterial 22.6 22.0 - 26.0 mmol/L    POCT Hemoglobin, Arterial 10.7 (L) 13.5 - 17.5 g/dL    POCT Anion Gap, Arterial 11 10 - 25 mmo/L    Patient Temperature 37.0 degrees Celsius    FiO2 100 %   Coox Panel, Arterial Unsolicited   Result Value Ref Range    POCT Hemoglobin, Arterial 10.7 (L) 13.5 - 17.5 g/dL    POCT Oxy Hemoglobin, Arterial 98.1 (H) 94.0 - 98.0 %    POCT Carboxyhemoglobin, Arterial 0.4 %    POCT Methemoglobin, Arterial 0.7 0.0 - 1.5 %    POCT Deoxy Hemoglobin, Arterial 0.8 0.0 - 5.0 %   Blood Gas Arterial Full Panel Unsolicited   Result Value Ref Range    POCT pH, Arterial 7.37 (L) 7.38 - 7.42 pH    POCT pCO2, Arterial 38 38 - 42 mm Hg    POCT pO2, Arterial 259 (H) 85 - 95 mm Hg    POCT SO2, Arterial 99 94 - 100 %    POCT Oxy Hemoglobin, Arterial 98.0 94.0 - 98.0 %    POCT Hematocrit Calculated, Arterial 32.0 (L) 41.0 - 52.0 %    POCT Sodium, Arterial 138 136 - 145 mmol/L    POCT Potassium, Arterial 3.0 (L) 3.5 - 5.3 mmol/L    POCT Chloride, Arterial 106 98 - 107 mmol/L    POCT Ionized Calcium, Arterial 1.05 (L) 1.10 - 1.33 mmol/L    POCT Glucose, Arterial 154 (H) 74 - 99 mg/dL    POCT Lactate, Arterial 1.6 0.4 - 2.0 mmol/L    POCT Base Excess, Arterial -3.0 (L) -2.0 - 3.0 mmol/L    POCT HCO3 Calculated, Arterial 22.0 22.0 - 26.0 mmol/L    POCT Hemoglobin, Arterial 10.6 (L) 13.5 - 17.5 g/dL    POCT Anion Gap, Arterial 13 10 - 25 mmo/L    Patient Temperature 37.0 degrees Celsius    FiO2 100 %   Coox Panel, Arterial Unsolicited   Result Value Ref Range    POCT Hemoglobin, Arterial 10.6 (L) 13.5 - 17.5 g/dL     POCT Oxy Hemoglobin, Arterial 98.0 94.0 - 98.0 %    POCT Carboxyhemoglobin, Arterial 0.4 %    POCT Methemoglobin, Arterial 0.7 0.0 - 1.5 %    POCT Deoxy Hemoglobin, Arterial 1.0 0.0 - 5.0 %   Calcium, Ionized   Result Value Ref Range    POCT Calcium, Ionized 1.01 (L) 1.1 - 1.33 mmol/L   Magnesium   Result Value Ref Range    Magnesium 1.85 1.60 - 2.40 mg/dL   Coagulation Screen   Result Value Ref Range    Protime 14.1 (H) 9.8 - 12.8 seconds    INR 1.3 (H) 0.9 - 1.1    aPTT 26 (L) 27 - 38 seconds   Fibrinogen   Result Value Ref Range    Fibrinogen 155 (L) 200 - 400 mg/dL   CBC   Result Value Ref Range    WBC 17.3 (H) 4.4 - 11.3 x10*3/uL    nRBC 0.0 0.0 - 0.0 /100 WBCs    RBC 3.28 (L) 4.50 - 5.90 x10*6/uL    Hemoglobin 10.2 (L) 13.5 - 17.5 g/dL    Hematocrit 31.2 (L) 41.0 - 52.0 %    MCV 95 80 - 100 fL    MCH 31.1 26.0 - 34.0 pg    MCHC 32.7 32.0 - 36.0 g/dL    RDW 13.3 11.5 - 14.5 %    Platelets 126 (L) 150 - 450 x10*3/uL   Hepatic function panel   Result Value Ref Range    Albumin 3.5 3.4 - 5.0 g/dL    Bilirubin, Total 1.0 0.0 - 1.2 mg/dL    Bilirubin, Direct 0.6 (H) 0.0 - 0.3 mg/dL    Alkaline Phosphatase 45 33 - 136 U/L    ALT 29 10 - 52 U/L    AST 29 9 - 39 U/L    Total Protein 5.2 (L) 6.4 - 8.2 g/dL   Phosphorus   Result Value Ref Range    Phosphorus 3.1 2.5 - 4.9 mg/dL   Basic Metabolic Panel   Result Value Ref Range    Glucose 134 (H) 74 - 99 mg/dL    Sodium 141 136 - 145 mmol/L    Potassium 3.5 3.5 - 5.3 mmol/L    Chloride 106 98 - 107 mmol/L    Bicarbonate 22 21 - 32 mmol/L    Anion Gap 17 10 - 20 mmol/L    Urea Nitrogen 15 6 - 23 mg/dL    Creatinine 0.74 0.50 - 1.30 mg/dL    eGFR >90 >60 mL/min/1.73m*2    Calcium 7.7 (L) 8.6 - 10.6 mg/dL   Blood Gas Arterial Full Panel   Result Value Ref Range    POCT pH, Arterial 7.39 7.38 - 7.42 pH    POCT pCO2, Arterial 36 (L) 38 - 42 mm Hg    POCT pO2, Arterial 203 (H) 85 - 95 mm Hg    POCT SO2, Arterial 100 94 - 100 %    POCT Oxy Hemoglobin, Arterial 98.1 (H) 94.0 -  98.0 %    POCT Hematocrit Calculated, Arterial 49.0 41.0 - 52.0 %    POCT Sodium, Arterial 135 (L) 136 - 145 mmol/L    POCT Potassium, Arterial 3.4 (L) 3.5 - 5.3 mmol/L    POCT Chloride, Arterial 104 98 - 107 mmol/L    POCT Ionized Calcium, Arterial 1.01 (L) 1.10 - 1.33 mmol/L    POCT Glucose, Arterial 131 (H) 74 - 99 mg/dL    POCT Lactate, Arterial 1.9 0.4 - 2.0 mmol/L    POCT Base Excess, Arterial -2.6 (L) -2.0 - 3.0 mmol/L    POCT HCO3 Calculated, Arterial 21.8 (L) 22.0 - 26.0 mmol/L    POCT Hemoglobin, Arterial 16.3 13.5 - 17.5 g/dL    POCT Anion Gap, Arterial 13 10 - 25 mmo/L    Patient Temperature 37.0 degrees Celsius    FiO2 50 %   Blood Gas Arterial Full Panel   Result Value Ref Range    POCT pH, Arterial 7.36 (L) 7.38 - 7.42 pH    POCT pCO2, Arterial 39 38 - 42 mm Hg    POCT pO2, Arterial 185 (H) 85 - 95 mm Hg    POCT SO2, Arterial 100 94 - 100 %    POCT Oxy Hemoglobin, Arterial 98.0 94.0 - 98.0 %    POCT Hematocrit Calculated, Arterial 34.0 (L) 41.0 - 52.0 %    POCT Sodium, Arterial 137 136 - 145 mmol/L    POCT Potassium, Arterial 3.3 (L) 3.5 - 5.3 mmol/L    POCT Chloride, Arterial 106 98 - 107 mmol/L    POCT Ionized Calcium, Arterial 1.07 (L) 1.10 - 1.33 mmol/L    POCT Glucose, Arterial 131 (H) 74 - 99 mg/dL    POCT Lactate, Arterial 1.1 0.4 - 2.0 mmol/L    POCT Base Excess, Arterial -3.2 (L) -2.0 - 3.0 mmol/L    POCT HCO3 Calculated, Arterial 22.0 22.0 - 26.0 mmol/L    POCT Hemoglobin, Arterial 11.4 (L) 13.5 - 17.5 g/dL    POCT Anion Gap, Arterial 12 10 - 25 mmo/L    Patient Temperature 37.0 degrees Celsius    FiO2 40 %   Blood Gas Arterial Full Panel   Result Value Ref Range    POCT pH, Arterial 7.23 (LL) 7.38 - 7.42 pH    POCT pCO2, Arterial 59 (H) 38 - 42 mm Hg    POCT pO2, Arterial 131 (H) 85 - 95 mm Hg    POCT SO2, Arterial 99 94 - 100 %    POCT Oxy Hemoglobin, Arterial 97.0 94.0 - 98.0 %    POCT Hematocrit Calculated, Arterial 34.0 (L) 41.0 - 52.0 %    POCT Sodium, Arterial 137 136 - 145 mmol/L     POCT Potassium, Arterial 3.7 3.5 - 5.3 mmol/L    POCT Chloride, Arterial 104 98 - 107 mmol/L    POCT Ionized Calcium, Arterial 1.10 1.10 - 1.33 mmol/L    POCT Glucose, Arterial 136 (H) 74 - 99 mg/dL    POCT Lactate, Arterial 0.6 0.4 - 2.0 mmol/L    POCT Base Excess, Arterial -3.5 (L) -2.0 - 3.0 mmol/L    POCT HCO3 Calculated, Arterial 24.7 22.0 - 26.0 mmol/L    POCT Hemoglobin, Arterial 11.4 (L) 13.5 - 17.5 g/dL    POCT Anion Gap, Arterial 12 10 - 25 mmo/L    Patient Temperature 37.0 degrees Celsius    FiO2 40 %   Blood Gas Arterial Full Panel   Result Value Ref Range    POCT pH, Arterial 7.38 7.38 - 7.42 pH    POCT pCO2, Arterial 38 38 - 42 mm Hg    POCT pO2, Arterial 477 (H) 85 - 95 mm Hg    POCT SO2, Arterial 100 94 - 100 %    POCT Oxy Hemoglobin, Arterial 98.0 94.0 - 98.0 %    POCT Hematocrit Calculated, Arterial 33.0 (L) 41.0 - 52.0 %    POCT Sodium, Arterial 135 (L) 136 - 145 mmol/L    POCT Potassium, Arterial 3.7 3.5 - 5.3 mmol/L    POCT Chloride, Arterial 106 98 - 107 mmol/L    POCT Ionized Calcium, Arterial 1.07 (L) 1.10 - 1.33 mmol/L    POCT Glucose, Arterial 130 (H) 74 - 99 mg/dL    POCT Lactate, Arterial 1.6 0.4 - 2.0 mmol/L    POCT Base Excess, Arterial -2.3 (L) -2.0 - 3.0 mmol/L    POCT HCO3 Calculated, Arterial 22.5 22.0 - 26.0 mmol/L    POCT Hemoglobin, Arterial 11.0 (L) 13.5 - 17.5 g/dL    POCT Anion Gap, Arterial 10 10 - 25 mmo/L    Patient Temperature 37.0 degrees Celsius    FiO2 100 %   CBC   Result Value Ref Range    WBC 16.5 (H) 4.4 - 11.3 x10*3/uL    nRBC 0.0 0.0 - 0.0 /100 WBCs    RBC 3.32 (L) 4.50 - 5.90 x10*6/uL    Hemoglobin 10.6 (L) 13.5 - 17.5 g/dL    Hematocrit 31.9 (L) 41.0 - 52.0 %    MCV 96 80 - 100 fL    MCH 31.9 26.0 - 34.0 pg    MCHC 33.2 32.0 - 36.0 g/dL    RDW 13.2 11.5 - 14.5 %    Platelets 149 (L) 150 - 450 x10*3/uL   POCT GLUCOSE   Result Value Ref Range    POCT Glucose 112 (H) 74 - 99 mg/dL   Blood Gas Arterial Full Panel   Result Value Ref Range    POCT pH, Arterial 7.51  (H) 7.38 - 7.42 pH    POCT pCO2, Arterial 26 (L) 38 - 42 mm Hg    POCT pO2, Arterial 301 (H) 85 - 95 mm Hg    POCT SO2, Arterial 100 94 - 100 %    POCT Oxy Hemoglobin, Arterial 97.9 94.0 - 98.0 %    POCT Hematocrit Calculated, Arterial 32.0 (L) 41.0 - 52.0 %    POCT Sodium, Arterial 136 136 - 145 mmol/L    POCT Potassium, Arterial 4.0 3.5 - 5.3 mmol/L    POCT Chloride, Arterial 106 98 - 107 mmol/L    POCT Ionized Calcium, Arterial 1.11 1.10 - 1.33 mmol/L    POCT Glucose, Arterial 97 74 - 99 mg/dL    POCT Lactate, Arterial 0.7 0.4 - 2.0 mmol/L    POCT Base Excess, Arterial -1.3 -2.0 - 3.0 mmol/L    POCT HCO3 Calculated, Arterial 20.7 (L) 22.0 - 26.0 mmol/L    POCT Hemoglobin, Arterial 10.8 (L) 13.5 - 17.5 g/dL    POCT Anion Gap, Arterial 13 10 - 25 mmo/L    Patient Temperature 37.0 degrees Celsius    FiO2 60 %   Blood Gas Arterial Full Panel   Result Value Ref Range    POCT pH, Arterial 7.39 7.38 - 7.42 pH    POCT pCO2, Arterial 37 (L) 38 - 42 mm Hg    POCT pO2, Arterial 129 (H) 85 - 95 mm Hg    POCT SO2, Arterial 99 94 - 100 %    POCT Oxy Hemoglobin, Arterial 97.4 94.0 - 98.0 %    POCT Hematocrit Calculated, Arterial 32.0 (L) 41.0 - 52.0 %    POCT Sodium, Arterial 136 136 - 145 mmol/L    POCT Potassium, Arterial 3.9 3.5 - 5.3 mmol/L    POCT Chloride, Arterial 105 98 - 107 mmol/L    POCT Ionized Calcium, Arterial 1.14 1.10 - 1.33 mmol/L    POCT Glucose, Arterial 96 74 - 99 mg/dL    POCT Lactate, Arterial 0.5 0.4 - 2.0 mmol/L    POCT Base Excess, Arterial -2.2 (L) -2.0 - 3.0 mmol/L    POCT HCO3 Calculated, Arterial 22.4 22.0 - 26.0 mmol/L    POCT Hemoglobin, Arterial 10.7 (L) 13.5 - 17.5 g/dL    POCT Anion Gap, Arterial 13 10 - 25 mmo/L    Patient Temperature 37.0 degrees Celsius    FiO2 30 %   Renal Function Panel   Result Value Ref Range    Glucose 98 74 - 99 mg/dL    Sodium 138 136 - 145 mmol/L    Potassium 3.8 3.5 - 5.3 mmol/L    Chloride 107 98 - 107 mmol/L    Bicarbonate 23 21 - 32 mmol/L    Anion Gap 12 10 - 20  mmol/L    Urea Nitrogen 12 6 - 23 mg/dL    Creatinine 0.81 0.50 - 1.30 mg/dL    eGFR >90 >60 mL/min/1.73m*2    Calcium 8.0 (L) 8.6 - 10.6 mg/dL    Phosphorus 1.8 (L) 2.5 - 4.9 mg/dL    Albumin 3.7 3.4 - 5.0 g/dL   Magnesium   Result Value Ref Range    Magnesium 2.26 1.60 - 2.40 mg/dL   CBC   Result Value Ref Range    WBC 14.8 (H) 4.4 - 11.3 x10*3/uL    nRBC 0.0 0.0 - 0.0 /100 WBCs    RBC 3.23 (L) 4.50 - 5.90 x10*6/uL    Hemoglobin 10.3 (L) 13.5 - 17.5 g/dL    Hematocrit 30.7 (L) 41.0 - 52.0 %    MCV 95 80 - 100 fL    MCH 31.9 26.0 - 34.0 pg    MCHC 33.6 32.0 - 36.0 g/dL    RDW 13.5 11.5 - 14.5 %    Platelets 160 150 - 450 x10*3/uL   Calcium, Ionized   Result Value Ref Range    POCT Calcium, Ionized 1.11 1.1 - 1.33 mmol/L   Blood Gas Arterial Full Panel   Result Value Ref Range    POCT pH, Arterial 7.34 (L) 7.38 - 7.42 pH    POCT pCO2, Arterial 42 38 - 42 mm Hg    POCT pO2, Arterial 163 (H) 85 - 95 mm Hg    POCT SO2, Arterial 98 94 - 100 %    POCT Oxy Hemoglobin, Arterial 97.2 94.0 - 98.0 %    POCT Hematocrit Calculated, Arterial 31.0 (L) 41.0 - 52.0 %    POCT Sodium, Arterial 136 136 - 145 mmol/L    POCT Potassium, Arterial 4.4 3.5 - 5.3 mmol/L    POCT Chloride, Arterial 105 98 - 107 mmol/L    POCT Ionized Calcium, Arterial 1.14 1.10 - 1.33 mmol/L    POCT Glucose, Arterial 94 74 - 99 mg/dL    POCT Lactate, Arterial 0.4 0.4 - 2.0 mmol/L    POCT Base Excess, Arterial -3.0 (L) -2.0 - 3.0 mmol/L    POCT HCO3 Calculated, Arterial 22.7 22.0 - 26.0 mmol/L    POCT Hemoglobin, Arterial 10.4 (L) 13.5 - 17.5 g/dL    POCT Anion Gap, Arterial 13 10 - 25 mmo/L    Patient Temperature 37.0 degrees Celsius    FiO2 40 %   POCT GLUCOSE   Result Value Ref Range    POCT Glucose 102 (H) 74 - 99 mg/dL   POCT GLUCOSE   Result Value Ref Range    POCT Glucose 100 (H) 74 - 99 mg/dL   Blood Gas Arterial Full Panel   Result Value Ref Range    POCT pH, Arterial 7.35 (L) 7.38 - 7.42 pH    POCT pCO2, Arterial 39 38 - 42 mm Hg    POCT pO2, Arterial  102 (H) 85 - 95 mm Hg    POCT SO2, Arterial 99 94 - 100 %    POCT Oxy Hemoglobin, Arterial 97.1 94.0 - 98.0 %    POCT Hematocrit Calculated, Arterial 33.0 (L) 41.0 - 52.0 %    POCT Sodium, Arterial 136 136 - 145 mmol/L    POCT Potassium, Arterial 3.8 3.5 - 5.3 mmol/L    POCT Chloride, Arterial 106 98 - 107 mmol/L    POCT Ionized Calcium, Arterial 1.21 1.10 - 1.33 mmol/L    POCT Glucose, Arterial 93 74 - 99 mg/dL    POCT Lactate, Arterial 0.8 0.4 - 2.0 mmol/L    POCT Base Excess, Arterial -3.8 (L) -2.0 - 3.0 mmol/L    POCT HCO3 Calculated, Arterial 21.5 (L) 22.0 - 26.0 mmol/L    POCT Hemoglobin, Arterial 11.1 (L) 13.5 - 17.5 g/dL    POCT Anion Gap, Arterial 12 10 - 25 mmo/L    Patient Temperature 37.0 degrees Celsius    FiO2 45 %        Parish Vlaerio is a 74 y.o. year old male patient who presents for Procedure(s):  MIDCAB x 2, ROBOT-ASSISTED; RADIAL with Sofia Carrasco MD on 1/21/2025. Acute Pain consulted for assistance with pain control.      Plan:     - Left erector spinae block with catheter performed pre-operatively on 1/21/2025  - Ambit ball with Ropivacaine 0.2%/NaCl 0.9% 500mL, Rate 10 cc/hr unilaterally  - Ambit medication will not interfere with pain medication prescribed by the primary team.   -Patient received 5ml bolus of 0.5% ropivacaine   - Please be aware of local anesthetic toxic dose and absorption variability before considering lidocaine patches  - Acute pain service will follow while catheters in place  - Rest of pain management per primary team    Acute Pain Service Resident  pg [phone] ph [phone]
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
## 2025-01-22 NOTE — PROGRESS NOTES
"Parish Valerio \"Bret\" is a 74 y.o. male on day 1 of admission presenting with Coronary artery disease involving native coronary artery of native heart.      Objective     Physical Exam    Last Recorded Vitals  Blood pressure 142/66, pulse 75, temperature 37.1 °C (98.8 °F), temperature source Temporal, resp. rate 17, height 1.753 m (5' 9\"), weight 61.7 kg (136 lb 0.4 oz), SpO2 99%.  Intake/Output last 3 Shifts:  I/O last 3 completed shifts:  In: 7160.6 (116.1 mL/kg) [I.V.:513.3 (8.3 mL/kg); Blood:242; IV Piggyback:6405.2]  Out: 3672 (59.5 mL/kg) [Urine:2570 (1.2 mL/kg/hr); Blood:200; Chest Tube:902]  Weight: 61.7 kg         Assessment/Plan   Assessment & Plan  Coronary artery disease involving native coronary artery of native heart    Coronary artery disease involving native coronary artery of native heart, unspecified whether angina present    Anticoagulant long-term use    75 y/o male s/p MIDCABx2, LIMA-LAD and PRINCE-DIAG on 1/21, ef ~50%. Extubated overnight. Low dose NE during mobilization this early morning.    Plan  Gentle fluid resuscitation and wean off of NE  Swallow eval  ASA, Statin. Review clopidogrel plan with primary surgeon.  Subcu heparin  If stable following resuscitation this morning could transfer to floor later today.     1) S/P CABGx2  2) Hx of dysphagia  3) Acute blood loss anemia  4) Hx of PAF    Due to the high probability of life threatening clinical decompensation, the patient required critical care time evaluating and managing this patient.  Critical care time included obtaining a history, examining the patient, ordering and reviewing studies, discussing, developing, and implementing a management plan, evaluating the patient's response to treatment, and discussion with other care team providers. I saw and evaluated the patient myself. I reviewed the provider's documentation and discussed the patient with the provider. Critical care time was performed exclusive of billable " procedures.    Critical Care Time: 38 minutes          Frederick Lewis MD

## 2025-01-22 NOTE — CONSULTS
Vancomycin Dosing by Pharmacy- INITIAL    Parish Valerio is a 74 y.o. year old male who Pharmacy has been consulted for vancomycin dosing for MRSA coverage till swab results. Based on the patient's indication and renal status this patient will be dosed based on a goal AUC of 400-600.     Renal function is currently stable.    Visit Vitals  /66   Pulse 61   Temp 35.9 °C (96.6 °F) (Temporal)   Resp 16        Lab Results   Component Value Date    CREATININE 0.74 2025    CREATININE 1.05 2025        Patient weight is as follows:   Vitals:    25 0622   Weight: 61.7 kg (136 lb 0.4 oz)       Cultures:  No results found for the encounter in last 14 days.        I/O last 3 completed shifts:  In: 4524.6 (73.3 mL/kg) [I.V.:156.8 (2.5 mL/kg); Blood:242; IV Piggyback:4125.8]  Out: 1515 (24.6 mL/kg) [Urine:935 (0.4 mL/kg/hr); Blood:200; Chest Tube:380]  Weight: 61.7 kg   I/O during current shift:  I/O this shift:  In: 774.5 [I.V.:175.1; IV Piggyback:599.4]  Out: 315 [Urine:235; Chest Tube:80]    Temp (24hrs), Av.9 °C (96.7 °F), Min:35.9 °C (96.6 °F), Max:36 °C (96.8 °F)         Assessment/Plan     Patient will not be given a loading dose.  Will initiate vancomycin maintenance,  1500 mg every 24 hours for 48 hours.    This dosing regimen is predicted by [employer] to result in the following pharmacokinetic parameters:    Regimen: 1500 mg IV every 24 hours.  Start time: 21:09 on 2025  Exposure target: AUC24 (range)400-600 mg/L.hr   [id number]: 381 mg/L.hr  AUC24,ss: 456 mg/L.hr  Probability of AUC24 > 400: 65 %  Ctrough,ss: 11.6 mg/L  Probability of Ctrough,ss > 20: 11 %    No follow up level is needed for 48 hours.     Will continue to monitor renal function daily while on vancomycin and order serum creatinine at least every 48 hours if not already ordered.  Follow for continued vancomycin needs, clinical response, and signs/symptoms of toxicity.       Wil Snyder, PharmD

## 2025-01-22 NOTE — PROGRESS NOTES
"     SLP Adult Inpatient Speech-Language Pathology Clinical Swallow Evaluation    Patient Name: Parish Valerio \"Araceli"  MRN: [id number]  Today's Date: 1/22/2025   Time Calculation  Start Time: 1400  Stop Time: 1415  Time Calculation (min): 15 min       Assessment:  Diagnosis: Functional Swallow    Pt w/ low clinical indicators for dysphagia per clinical swallow evaluation. Pt yields PASS on the [place] Swallow Protocol, which has a high sensitivity for airway invasion. Pt w/ baseline poor dentition impacting oral efficiency although reports no diet modifications required. No overt s/s of aspiration noted throughout evaluation. Rec. Continue diet as ordered. If there is change in status, increase in respiratory needs, or poor po tolerance rec. NPO and re consult SLP. Overall given baseline function SLP s/o. Thank you for this consult.    Recommendations:  Solid Diet Recommendations : IDDSI 7 - Regular Diet   Liquid Diet Recommendations: IDDSI 0 - Thin Liquids     Strategies/Guidelines:  Only present PO intake when awake and alert  Upright positioning   Slow rate/small boluses        Plan:  Inpatient/Swing Bed or Outpatient: Inpatient  SLP Plan: No skilled SLP  No Skilled SLP: At baseline function  SLP Discharge Recommendations: D/C as patient is functional for this level of care  SLP - OK to Discharge: Yes      Subjective     Baseline Assessment:  Hearing: Within Functional Limits    General Visit Information:  Patient Class: Inpatient  Caregiver Feedback: Fiance present and supportive  Past Medical History Relevant to Rehab: CAD s/p PCI (diag; 2001), stage C systolic HF/ICM/HFrEF with moderate LV dysfunction currently without an ICD, pAF on AAD and DOAC therapies (amiodarone 200mg daily, eliquis 5mg BID), and remote h/o polio at 18mos.  Prior to Session Communication: Bedside nurse, Physician      History and Physical:    Per H&P:  \" Mr. Bret Valerio 74M PMHx signifcant for CAD s/p PCI (diag; 2001), stage C systolic " "HF/ICM/HFrEF with moderate LV dysfunction currently without an ICD, pAF on AAD and DOAC therapies (amiodarone 200mg daily, eliquis 5mg BID), and remote h/o polio at 18mos. POD0 direct admit today for MIDCAB x2 LIMA to LAD, PRINCE to Diag with Dr. Carrasco. No intraoperative complications.  \"    Past Medical History:   Diagnosis Date    CHF (congestive heart failure)     Coronary artery disease      Family History   Problem Relation Name Age of Onset    Stroke Mother      Heart attack Father         Allergies   Allergen Reactions    Farxiga [Dapagliflozin] Nausea/vomiting         Relevant Results  XR chest 1 view 01/21/2025    Narrative  Interpreted By:  Trish Robertson and Jiang Sirui  STUDY:  XR CHEST 1 VIEW;  1/21/2025 7:53 pm    INDICATION:  Signs/Symptoms:Increase o2 req post surgery.      COMPARISON:  Radiograph 01/21/2025    ACCESSION NUMBER(S):  [id number]    ORDERING CLINICIAN:  REY CARRASCO    FINDINGS:  AP radiograph of the chest was provided.    Stable positioning of a left upper apical surgical drain. Stable  positioning of a right IJ central venous catheter distal tip  overlying the right atrium. Stable positioning of the left-sided  double lumen endotracheal tube with distal tip 3.9 cm above the  genie. The 2nd lumen terminates within the left mainstem bronchus is  similar to the prior examination. Left chest wall subcutaneous  emphysema similar to the prior examination.    CARDIOMEDIASTINAL SILHOUETTE:  Cardiomediastinal silhouette is stable in size and configuration.  Calcifications of the aortic knob are noted.    LUNGS:  No pleural effusion, consolidation, or sizable pneumothorax.    ABDOMEN:  No remarkable upper abdominal findings.    BONES:  No acute osseous changes.    Impression  1. No pleural effusion, consolidation, or sizable pneumothorax. No  significant interval change in aeration of the bilateral lungs.  2. Medical devices as above.    I personally reviewed the image(s) / study and I agree " with the  findings as stated by Doug Leon MD. This study was interpreted at  [facility], [place], [place].    MACRO:  None    Signed by: Trish Robertson 1/22/2025 10:11 AM  Dictation workstation:   [id number]    Objective     Dysphagia Risk Factors:  Predisposing: Tracheostomy (decannulation)   Clinical signs of possible chronic dysphagia: NA  Precipitating:  S/p MIDCAB x2 LIMA to LAD PRINCE     Oral/Motor Assessment:  Oral Hygiene: WFL  Dentition: Some Missing Teeth  Oral Motor: Within Functional Limits  Facial Sensation: Within Functional Limits  Facial Symmetry: Within Functional Limits  Labial Agility: Within Functional Limits  Labial Strength: Within Functional Limits  Labial Symmetry: Within Functional Limits  Lingual Agility: Within Functional Limits  Lingual ROM: Within Functional Limits  Lingual Strength: Within Functional Limits  Vocal Quality: Within Functional Limits  Intelligibility: Intelligible  Breath Support: Adequate for speech  Hearing: Within Functional Limits      Clinical Observations:  Patient Positioning: Upright in Bed  Management of Oral Secretions: Adequate    Consistencies Trialed: Yes  Consistencies Trialed: Thin (IDDSI Level 0) - Straw, Pureed/extremely thick (IDDSI Level 4), Regular (IDDSI Level 7)    Oral phase:  -intact bilabial seal; no anterior spillage, functional mastication, no oral residuals  Pharyngeal Phase: no evidence of airway compromise w/ oral intake    [place] Swallow Protocol:    PASS: Complete and uninterrupted drinking of all 3 ounces (90cc) of water without overt signs of aspiration (I.e. coughing or choking, either during or immediate after completion)  *The [place] Swallow Protocol (YSP) is a standardized measure with high sensitivity(96.5%) and negative prediction value (97.9%).    Risk Assessment:     Factors Associated with Aspiration Related Pulmonary Complications:   Positive Impact Negative Impact   Pulmonary Clearance    Medical Conditions  (COPD, CHF, asthma)  xx   Reduced function (reduced mobility/increased dependence for care)   xx   Pulmonary health (h/o smoking, need for supplemental O2, need for inhaled medications)  xx   Immune Response    Medical co-morbidities xx    Presence of current infectious process  xx    Bacteriological Contents   Dependencies for Oral Care xx    Dental Care/Repair xx    Oral Hygiene  xx    Xerostomia xx    Diabetes xx    Acid Suppression therapy (PPI, H2 Blockers) xx      Inpatient Education:  Adult Outpatient Education  Risk and Benefits Discussed with Patient/Caregiver/Other: yes  Patient/Caregiver Demonstrated Understanding: yes  Plan of Care Discussed and Agreed Upon: yes  Patient Response to Education: Patient/Caregiver Verbalized Understanding of Information, Patient/Caregiver Asked Appropriate Questions

## 2025-01-22 NOTE — PROGRESS NOTES
01/22/25 1008   Discharge Planning   Living Arrangements Spouse/significant other  (s/o)   Support Systems Spouse/significant other;Family members   Assistance Needed no Ads (but has cane/walker) / drives / retired / no home O2 / no HD   Do you have animals or pets at home? Yes   Type of Animals or Pets 1 cat   Who is requesting discharge planning? Provider   Expected Discharge Disposition [place]   Does the patient need discharge transport arranged? No   Housing Stability   In the last 12 months, was there a time when you were not able to pay the mortgage or rent on time? N   In the past 12 months, how many times have you moved where you were living? 0   At any time in the past 12 months, were you homeless or living in a shelter (including now)? N   Transportation Needs   In the past 12 months, has lack of transportation kept you from medical appointments or from getting medications? no   In the past 12 months, has lack of transportation kept you from meetings, work, or from getting things needed for daily living? No   Patient Choice   Provider Choice list and CMS website (https://medicare.gov/care-compare#search) for post-acute Quality and Resource Measure Data were provided and reviewed with: Patient;Family   Patient / Family choosing to utilize agency / facility established prior to hospitalization No   Stroke Family Assessment   Stroke Family Assessment Needed No   Intensity of Service   Intensity of Service 0-30 min     DC/SDOH assessments with patient and fiance (Elin). Verified EPIC info of address / PCP / pharmacy. Awaiting PT/OT for determining dc planning.  At minimum, to arrange for HHC which if no higher loc;patient and fiance AOC for [facility].     Adrianna Guthrie (LSW, MSW)

## 2025-01-23 ENCOUNTER — APPOINTMENT (OUTPATIENT)
Dept: RADIOLOGY | Facility: HOSPITAL | Age: 75
DRG: 235 | End: 2025-01-23
Payer: MEDICARE

## 2025-01-23 LAB
ALBUMIN SERPL BCP-MCNC: 3.9 G/DL (ref 3.4–5)
ALBUMIN SERPL BCP-MCNC: 4.2 G/DL (ref 3.4–5)
ANION GAP BLDA CALCULATED.4IONS-SCNC: 10 MMO/L (ref 10–25)
ANION GAP BLDA CALCULATED.4IONS-SCNC: 12 MMO/L (ref 10–25)
ANION GAP BLDA CALCULATED.4IONS-SCNC: 12 MMO/L (ref 10–25)
ANION GAP BLDA CALCULATED.4IONS-SCNC: 8 MMO/L (ref 10–25)
ANION GAP BLDA CALCULATED.4IONS-SCNC: 8 MMO/L (ref 10–25)
ANION GAP BLDA CALCULATED.4IONS-SCNC: ABNORMAL MMOL/L
ANION GAP SERPL CALC-SCNC: 10 MMOL/L (ref 10–20)
ANION GAP SERPL CALC-SCNC: 13 MMOL/L (ref 10–20)
ATRIAL RATE: 60 BPM
ATRIAL RATE: 74 BPM
BASE EXCESS BLDA CALC-SCNC: -1.3 MMOL/L (ref -2–3)
BASE EXCESS BLDA CALC-SCNC: -1.8 MMOL/L (ref -2–3)
BASE EXCESS BLDA CALC-SCNC: -3.3 MMOL/L (ref -2–3)
BASE EXCESS BLDA CALC-SCNC: 0.3 MMOL/L (ref -2–3)
BASE EXCESS BLDA CALC-SCNC: 0.9 MMOL/L (ref -2–3)
BASE EXCESS BLDA CALC-SCNC: 2.2 MMOL/L (ref -2–3)
BODY TEMPERATURE: 37 DEGREES CELSIUS
BUN SERPL-MCNC: 12 MG/DL (ref 6–23)
BUN SERPL-MCNC: 13 MG/DL (ref 6–23)
CA-I BLD-SCNC: 1.12 MMOL/L (ref 1.1–1.33)
CA-I BLD-SCNC: 1.18 MMOL/L (ref 1.1–1.33)
CA-I BLDA-SCNC: 1.12 MMOL/L (ref 1.1–1.33)
CA-I BLDA-SCNC: 1.15 MMOL/L (ref 1.1–1.33)
CA-I BLDA-SCNC: 1.15 MMOL/L (ref 1.1–1.33)
CA-I BLDA-SCNC: 1.16 MMOL/L (ref 1.1–1.33)
CA-I BLDA-SCNC: 1.22 MMOL/L (ref 1.1–1.33)
CA-I BLDA-SCNC: ABNORMAL MMOL/L
CALCIUM SERPL-MCNC: 8.4 MG/DL (ref 8.6–10.6)
CALCIUM SERPL-MCNC: 8.5 MG/DL (ref 8.6–10.6)
CHLORIDE BLDA-SCNC: 102 MMOL/L (ref 98–107)
CHLORIDE BLDA-SCNC: 104 MMOL/L (ref 98–107)
CHLORIDE BLDA-SCNC: 105 MMOL/L (ref 98–107)
CHLORIDE BLDA-SCNC: 106 MMOL/L (ref 98–107)
CHLORIDE BLDA-SCNC: 106 MMOL/L (ref 98–107)
CHLORIDE BLDA-SCNC: 110 MMOL/L (ref 98–107)
CHLORIDE SERPL-SCNC: 103 MMOL/L (ref 98–107)
CHLORIDE SERPL-SCNC: 108 MMOL/L (ref 98–107)
CO2 SERPL-SCNC: 25 MMOL/L (ref 21–32)
CO2 SERPL-SCNC: 27 MMOL/L (ref 21–32)
CREAT SERPL-MCNC: 0.71 MG/DL (ref 0.5–1.3)
CREAT SERPL-MCNC: 0.9 MG/DL (ref 0.5–1.3)
EGFRCR SERPLBLD CKD-EPI 2021: 90 ML/MIN/1.73M*2
EGFRCR SERPLBLD CKD-EPI 2021: >90 ML/MIN/1.73M*2
ERYTHROCYTE [DISTWIDTH] IN BLOOD BY AUTOMATED COUNT: 13.4 % (ref 11.5–14.5)
ERYTHROCYTE [DISTWIDTH] IN BLOOD BY AUTOMATED COUNT: 13.8 % (ref 11.5–14.5)
ERYTHROCYTE [DISTWIDTH] IN BLOOD BY AUTOMATED COUNT: 13.8 % (ref 11.5–14.5)
GLUCOSE BLD MANUAL STRIP-MCNC: 109 MG/DL (ref 74–99)
GLUCOSE BLD MANUAL STRIP-MCNC: 95 MG/DL (ref 74–99)
GLUCOSE BLDA-MCNC: 101 MG/DL (ref 74–99)
GLUCOSE BLDA-MCNC: 102 MG/DL (ref 74–99)
GLUCOSE BLDA-MCNC: 106 MG/DL (ref 74–99)
GLUCOSE BLDA-MCNC: 92 MG/DL (ref 74–99)
GLUCOSE BLDA-MCNC: 96 MG/DL (ref 74–99)
GLUCOSE BLDA-MCNC: 98 MG/DL (ref 74–99)
GLUCOSE SERPL-MCNC: 104 MG/DL (ref 74–99)
GLUCOSE SERPL-MCNC: 105 MG/DL (ref 74–99)
HCO3 BLDA-SCNC: 21.1 MMOL/L (ref 22–26)
HCO3 BLDA-SCNC: 23.1 MMOL/L (ref 22–26)
HCO3 BLDA-SCNC: 23.7 MMOL/L (ref 22–26)
HCO3 BLDA-SCNC: 24.6 MMOL/L (ref 22–26)
HCO3 BLDA-SCNC: 25.2 MMOL/L (ref 22–26)
HCO3 BLDA-SCNC: 26.5 MMOL/L (ref 22–26)
HCT VFR BLD AUTO: 23.7 % (ref 41–52)
HCT VFR BLD AUTO: 24.1 % (ref 41–52)
HCT VFR BLD AUTO: 24.9 % (ref 41–52)
HCT VFR BLD EST: 23 % (ref 41–52)
HCT VFR BLD EST: 23 % (ref 41–52)
HCT VFR BLD EST: 24 % (ref 41–52)
HCT VFR BLD EST: 26 % (ref 41–52)
HCT VFR BLD EST: 27 % (ref 41–52)
HCT VFR BLD EST: 27 % (ref 41–52)
HGB BLD-MCNC: 7.9 G/DL (ref 13.5–17.5)
HGB BLD-MCNC: 8.4 G/DL (ref 13.5–17.5)
HGB BLD-MCNC: 8.4 G/DL (ref 13.5–17.5)
HGB BLDA-MCNC: 7.7 G/DL (ref 13.5–17.5)
HGB BLDA-MCNC: 7.8 G/DL (ref 13.5–17.5)
HGB BLDA-MCNC: 8 G/DL (ref 13.5–17.5)
HGB BLDA-MCNC: 8.5 G/DL (ref 13.5–17.5)
HGB BLDA-MCNC: 8.9 G/DL (ref 13.5–17.5)
HGB BLDA-MCNC: 8.9 G/DL (ref 13.5–17.5)
INHALED O2 CONCENTRATION: 35 %
INHALED O2 CONCENTRATION: 42 %
INHALED O2 CONCENTRATION: 60 %
INHALED O2 CONCENTRATION: 60 %
INHALED O2 CONCENTRATION: 70 %
INHALED O2 CONCENTRATION: 70 %
LACTATE BLDA-SCNC: 0.5 MMOL/L (ref 0.4–2)
LACTATE BLDA-SCNC: 0.6 MMOL/L (ref 0.4–2)
LACTATE BLDA-SCNC: 0.6 MMOL/L (ref 0.4–2)
LACTATE BLDA-SCNC: 0.8 MMOL/L (ref 0.4–2)
LACTATE BLDA-SCNC: 0.8 MMOL/L (ref 0.4–2)
LACTATE BLDA-SCNC: 1.2 MMOL/L (ref 0.4–2)
MAGNESIUM SERPL-MCNC: 1.92 MG/DL (ref 1.6–2.4)
MAGNESIUM SERPL-MCNC: 2.23 MG/DL (ref 1.6–2.4)
MCH RBC QN AUTO: 31.7 PG (ref 26–34)
MCH RBC QN AUTO: 32.1 PG (ref 26–34)
MCH RBC QN AUTO: 32.2 PG (ref 26–34)
MCHC RBC AUTO-ENTMCNC: 33.3 G/DL (ref 32–36)
MCHC RBC AUTO-ENTMCNC: 33.7 G/DL (ref 32–36)
MCHC RBC AUTO-ENTMCNC: 34.9 G/DL (ref 32–36)
MCV RBC AUTO: 92 FL (ref 80–100)
MCV RBC AUTO: 94 FL (ref 80–100)
MCV RBC AUTO: 97 FL (ref 80–100)
NRBC BLD-RTO: 0 /100 WBCS (ref 0–0)
OXYHGB MFR BLDA: 87.9 % (ref 94–98)
OXYHGB MFR BLDA: 87.9 % (ref 94–98)
OXYHGB MFR BLDA: 95.9 % (ref 94–98)
OXYHGB MFR BLDA: 96.1 % (ref 94–98)
OXYHGB MFR BLDA: 97 % (ref 94–98)
OXYHGB MFR BLDA: 97.9 % (ref 94–98)
P AXIS: 57 DEGREES
P AXIS: 80 DEGREES
P OFFSET: 147 MS
P OFFSET: 185 MS
P ONSET: 116 MS
P ONSET: 119 MS
PCO2 BLDA: 34 MM HG (ref 38–42)
PCO2 BLDA: 37 MM HG (ref 38–42)
PCO2 BLDA: 38 MM HG (ref 38–42)
PCO2 BLDA: 39 MM HG (ref 38–42)
PCO2 BLDA: 39 MM HG (ref 38–42)
PCO2 BLDA: 40 MM HG (ref 38–42)
PH BLDA: 7.38 PH (ref 7.38–7.42)
PH BLDA: 7.38 PH (ref 7.38–7.42)
PH BLDA: 7.4 PH (ref 7.38–7.42)
PH BLDA: 7.43 PH (ref 7.38–7.42)
PH BLDA: 7.43 PH (ref 7.38–7.42)
PH BLDA: 7.44 PH (ref 7.38–7.42)
PHOSPHATE SERPL-MCNC: 1.9 MG/DL (ref 2.5–4.9)
PHOSPHATE SERPL-MCNC: 2.5 MG/DL (ref 2.5–4.9)
PLATELET # BLD AUTO: 102 X10*3/UL (ref 150–450)
PLATELET # BLD AUTO: 90 X10*3/UL (ref 150–450)
PLATELET # BLD AUTO: 95 X10*3/UL (ref 150–450)
PO2 BLDA: 107 MM HG (ref 85–95)
PO2 BLDA: 152 MM HG (ref 85–95)
PO2 BLDA: 55 MM HG (ref 85–95)
PO2 BLDA: 56 MM HG (ref 85–95)
PO2 BLDA: 71 MM HG (ref 85–95)
PO2 BLDA: 75 MM HG (ref 85–95)
POTASSIUM BLDA-SCNC: 3.6 MMOL/L (ref 3.5–5.3)
POTASSIUM BLDA-SCNC: 3.8 MMOL/L (ref 3.5–5.3)
POTASSIUM BLDA-SCNC: 3.8 MMOL/L (ref 3.5–5.3)
POTASSIUM BLDA-SCNC: 3.9 MMOL/L (ref 3.5–5.3)
POTASSIUM BLDA-SCNC: 3.9 MMOL/L (ref 3.5–5.3)
POTASSIUM BLDA-SCNC: 4 MMOL/L (ref 3.5–5.3)
POTASSIUM SERPL-SCNC: 3.5 MMOL/L (ref 3.5–5.3)
POTASSIUM SERPL-SCNC: 4.1 MMOL/L (ref 3.5–5.3)
PR INTERVAL: 196 MS
PR INTERVAL: 200 MS
Q ONSET: 216 MS
Q ONSET: 217 MS
QRS COUNT: 10 BEATS
QRS COUNT: 12 BEATS
QRS DURATION: 104 MS
QRS DURATION: 110 MS
QT INTERVAL: 434 MS
QT INTERVAL: 532 MS
QTC CALCULATION(BAZETT): 481 MS
QTC CALCULATION(BAZETT): 532 MS
QTC FREDERICIA: 465 MS
QTC FREDERICIA: 532 MS
R AXIS: -4 DEGREES
R AXIS: 23 DEGREES
RBC # BLD AUTO: 2.45 X10*6/UL (ref 4.5–5.9)
RBC # BLD AUTO: 2.62 X10*6/UL (ref 4.5–5.9)
RBC # BLD AUTO: 2.65 X10*6/UL (ref 4.5–5.9)
SAO2 % BLDA: 100 % (ref 94–100)
SAO2 % BLDA: 90 % (ref 94–100)
SAO2 % BLDA: 90 % (ref 94–100)
SAO2 % BLDA: 97 % (ref 94–100)
SAO2 % BLDA: 98 % (ref 94–100)
SAO2 % BLDA: 98 % (ref 94–100)
SODIUM BLDA-SCNC: 135 MMOL/L (ref 136–145)
SODIUM BLDA-SCNC: 135 MMOL/L (ref 136–145)
SODIUM BLDA-SCNC: 137 MMOL/L (ref 136–145)
SODIUM BLDA-SCNC: ABNORMAL MMOL/L
SODIUM SERPL-SCNC: 139 MMOL/L (ref 136–145)
SODIUM SERPL-SCNC: 139 MMOL/L (ref 136–145)
STAPHYLOCOCCUS SPEC CULT: NORMAL
T AXIS: 84 DEGREES
T AXIS: 87 DEGREES
T OFFSET: 433 MS
T OFFSET: 483 MS
VENTRICULAR RATE: 60 BPM
VENTRICULAR RATE: 74 BPM
WBC # BLD AUTO: 11.3 X10*3/UL (ref 4.4–11.3)
WBC # BLD AUTO: 13.7 X10*3/UL (ref 4.4–11.3)
WBC # BLD AUTO: 17.1 X10*3/UL (ref 4.4–11.3)

## 2025-01-23 PROCEDURE — 2500000001 HC RX 250 WO HCPCS SELF ADMINISTERED DRUGS (ALT 637 FOR MEDICARE OP)

## 2025-01-23 PROCEDURE — 99291 CRITICAL CARE FIRST HOUR: CPT

## 2025-01-23 PROCEDURE — 71045 X-RAY EXAM CHEST 1 VIEW: CPT | Performed by: RADIOLOGY

## 2025-01-23 PROCEDURE — 2500000004 HC RX 250 GENERAL PHARMACY W/ HCPCS (ALT 636 FOR OP/ED)

## 2025-01-23 PROCEDURE — 82435 ASSAY OF BLOOD CHLORIDE: CPT

## 2025-01-23 PROCEDURE — 5A0935A ASSISTANCE WITH RESPIRATORY VENTILATION, LESS THAN 24 CONSECUTIVE HOURS, HIGH NASAL FLOW/VELOCITY: ICD-10-PCS

## 2025-01-23 PROCEDURE — 99231 SBSQ HOSP IP/OBS SF/LOW 25: CPT | Performed by: STUDENT IN AN ORGANIZED HEALTH CARE EDUCATION/TRAINING PROGRAM

## 2025-01-23 PROCEDURE — 2020000001 HC ICU ROOM DAILY

## 2025-01-23 PROCEDURE — 82810 BLOOD GASES O2 SAT ONLY: CPT

## 2025-01-23 PROCEDURE — 2500000004 HC RX 250 GENERAL PHARMACY W/ HCPCS (ALT 636 FOR OP/ED): Mod: JZ,TB

## 2025-01-23 PROCEDURE — 2500000002 HC RX 250 W HCPCS SELF ADMINISTERED DRUGS (ALT 637 FOR MEDICARE OP, ALT 636 FOR OP/ED): Performed by: NURSE PRACTITIONER

## 2025-01-23 PROCEDURE — 85027 COMPLETE CBC AUTOMATED: CPT | Performed by: NURSE PRACTITIONER

## 2025-01-23 PROCEDURE — 2500000005 HC RX 250 GENERAL PHARMACY W/O HCPCS

## 2025-01-23 PROCEDURE — 83735 ASSAY OF MAGNESIUM: CPT

## 2025-01-23 PROCEDURE — 85027 COMPLETE CBC AUTOMATED: CPT

## 2025-01-23 PROCEDURE — P9047 ALBUMIN (HUMAN), 25%, 50ML: HCPCS | Mod: JZ,TB

## 2025-01-23 PROCEDURE — 71045 X-RAY EXAM CHEST 1 VIEW: CPT

## 2025-01-23 PROCEDURE — 2500000002 HC RX 250 W HCPCS SELF ADMINISTERED DRUGS (ALT 637 FOR MEDICARE OP, ALT 636 FOR OP/ED)

## 2025-01-23 PROCEDURE — 37799 UNLISTED PX VASCULAR SURGERY: CPT | Performed by: NURSE PRACTITIONER

## 2025-01-23 PROCEDURE — 94660 CPAP INITIATION&MGMT: CPT

## 2025-01-23 PROCEDURE — 82330 ASSAY OF CALCIUM: CPT

## 2025-01-23 PROCEDURE — 80069 RENAL FUNCTION PANEL: CPT

## 2025-01-23 PROCEDURE — 94640 AIRWAY INHALATION TREATMENT: CPT

## 2025-01-23 PROCEDURE — 37799 UNLISTED PX VASCULAR SURGERY: CPT

## 2025-01-23 PROCEDURE — 2500000001 HC RX 250 WO HCPCS SELF ADMINISTERED DRUGS (ALT 637 FOR MEDICARE OP): Performed by: NURSE PRACTITIONER

## 2025-01-23 PROCEDURE — 82947 ASSAY GLUCOSE BLOOD QUANT: CPT

## 2025-01-23 PROCEDURE — 2500000005 HC RX 250 GENERAL PHARMACY W/O HCPCS: Performed by: NURSE PRACTITIONER

## 2025-01-23 PROCEDURE — 5A09357 ASSISTANCE WITH RESPIRATORY VENTILATION, LESS THAN 24 CONSECUTIVE HOURS, CONTINUOUS POSITIVE AIRWAY PRESSURE: ICD-10-PCS

## 2025-01-23 PROCEDURE — 2500000004 HC RX 250 GENERAL PHARMACY W/ HCPCS (ALT 636 FOR OP/ED): Performed by: NURSE PRACTITIONER

## 2025-01-23 PROCEDURE — 94667 MNPJ CHEST WALL 1ST: CPT

## 2025-01-23 PROCEDURE — 3E043XZ INTRODUCTION OF VASOPRESSOR INTO CENTRAL VEIN, PERCUTANEOUS APPROACH: ICD-10-PCS

## 2025-01-23 PROCEDURE — 99291 CRITICAL CARE FIRST HOUR: CPT | Performed by: ANESTHESIOLOGY

## 2025-01-23 RX ORDER — IPRATROPIUM BROMIDE AND ALBUTEROL SULFATE 2.5; .5 MG/3ML; MG/3ML
3 SOLUTION RESPIRATORY (INHALATION)
Status: DISCONTINUED | OUTPATIENT
Start: 2025-01-23 | End: 2025-01-26

## 2025-01-23 RX ORDER — ALBUMIN HUMAN 250 G/1000ML
25 SOLUTION INTRAVENOUS ONCE
Status: COMPLETED | OUTPATIENT
Start: 2025-01-23 | End: 2025-01-23

## 2025-01-23 RX ORDER — FUROSEMIDE 10 MG/ML
40 INJECTION INTRAMUSCULAR; INTRAVENOUS ONCE
Status: COMPLETED | OUTPATIENT
Start: 2025-01-23 | End: 2025-01-23

## 2025-01-23 RX ORDER — FUROSEMIDE 10 MG/ML
20 INJECTION INTRAMUSCULAR; INTRAVENOUS ONCE
Status: COMPLETED | OUTPATIENT
Start: 2025-01-23 | End: 2025-01-23

## 2025-01-23 RX ORDER — HYDROMORPHONE HYDROCHLORIDE 0.2 MG/ML
0.2 INJECTION INTRAMUSCULAR; INTRAVENOUS; SUBCUTANEOUS ONCE
Status: DISCONTINUED | OUTPATIENT
Start: 2025-01-23 | End: 2025-01-23

## 2025-01-23 RX ORDER — BUDESONIDE 0.5 MG/2ML
0.5 INHALANT ORAL
Status: DISCONTINUED | OUTPATIENT
Start: 2025-01-23 | End: 2025-01-25

## 2025-01-23 RX ORDER — BISACODYL 10 MG/1
10 SUPPOSITORY RECTAL DAILY
Status: DISCONTINUED | OUTPATIENT
Start: 2025-01-23 | End: 2025-01-25

## 2025-01-23 RX ORDER — ALBUMIN HUMAN 250 G/1000ML
SOLUTION INTRAVENOUS
Status: COMPLETED
Start: 2025-01-23 | End: 2025-01-23

## 2025-01-23 RX ORDER — MAGNESIUM SULFATE HEPTAHYDRATE 40 MG/ML
2 INJECTION, SOLUTION INTRAVENOUS ONCE
Status: COMPLETED | OUTPATIENT
Start: 2025-01-23 | End: 2025-01-23

## 2025-01-23 RX ADMIN — IPRATROPIUM BROMIDE AND ALBUTEROL SULFATE 3 ML: .5; 3 SOLUTION RESPIRATORY (INHALATION) at 15:10

## 2025-01-23 RX ADMIN — ACETAMINOPHEN 975 MG: 325 TABLET ORAL at 21:23

## 2025-01-23 RX ADMIN — POTASSIUM CHLORIDE 40 MEQ: 1.5 POWDER, FOR SOLUTION ORAL at 14:17

## 2025-01-23 RX ADMIN — HEPARIN SODIUM 5000 UNITS: 5000 INJECTION, SOLUTION INTRAVENOUS; SUBCUTANEOUS at 16:39

## 2025-01-23 RX ADMIN — OXYCODONE HYDROCHLORIDE 10 MG: 10 TABLET ORAL at 02:52

## 2025-01-23 RX ADMIN — LAMOTRIGINE 150 MG: 150 TABLET ORAL at 21:22

## 2025-01-23 RX ADMIN — ALBUMIN HUMAN 25 G: 0.25 SOLUTION INTRAVENOUS at 23:29

## 2025-01-23 RX ADMIN — ACETAMINOPHEN 975 MG: 325 TABLET ORAL at 04:53

## 2025-01-23 RX ADMIN — ALBUMIN HUMAN 25 G: 250 SOLUTION INTRAVENOUS at 23:29

## 2025-01-23 RX ADMIN — POLYETHYLENE GLYCOL 3350 17 G: 17 POWDER, FOR SOLUTION ORAL at 08:27

## 2025-01-23 RX ADMIN — HYDROMORPHONE HYDROCHLORIDE 0.2 MG: 0.2 INJECTION, SOLUTION INTRAMUSCULAR; INTRAVENOUS; SUBCUTANEOUS at 08:27

## 2025-01-23 RX ADMIN — ASPIRIN 81 MG CHEWABLE TABLET 81 MG: 81 TABLET CHEWABLE at 08:27

## 2025-01-23 RX ADMIN — CEFAZOLIN SODIUM 2 G: 2 INJECTION, SOLUTION INTRAVENOUS at 11:31

## 2025-01-23 RX ADMIN — SENNOSIDES AND DOCUSATE SODIUM 2 TABLET: 50; 8.6 TABLET ORAL at 21:23

## 2025-01-23 RX ADMIN — HEPARIN SODIUM 5000 UNITS: 5000 INJECTION, SOLUTION INTRAVENOUS; SUBCUTANEOUS at 08:27

## 2025-01-23 RX ADMIN — ATORVASTATIN CALCIUM 80 MG: 80 TABLET, FILM COATED ORAL at 21:23

## 2025-01-23 RX ADMIN — Medication 5 MG: at 21:23

## 2025-01-23 RX ADMIN — LAMOTRIGINE 200 MG: 150 TABLET ORAL at 16:38

## 2025-01-23 RX ADMIN — PANTOPRAZOLE SODIUM 40 MG: 40 TABLET, DELAYED RELEASE ORAL at 08:35

## 2025-01-23 RX ADMIN — BISACODYL 10 MG: 10 SUPPOSITORY RECTAL at 10:41

## 2025-01-23 RX ADMIN — FUROSEMIDE 40 MG: 10 INJECTION, SOLUTION INTRAVENOUS at 08:36

## 2025-01-23 RX ADMIN — FUROSEMIDE 20 MG: 10 INJECTION, SOLUTION INTRAMUSCULAR; INTRAVENOUS at 21:38

## 2025-01-23 RX ADMIN — SODIUM CHLORIDE, SODIUM LACTATE, POTASSIUM CHLORIDE, AND CALCIUM CHLORIDE 250 ML: 600; 310; 30; 20 INJECTION, SOLUTION INTRAVENOUS at 14:22

## 2025-01-23 RX ADMIN — Medication 10 L/MIN: at 15:10

## 2025-01-23 RX ADMIN — COLCHICINE 0.6 MG: 0.6 TABLET ORAL at 08:27

## 2025-01-23 RX ADMIN — ARIPIPRAZOLE 10 MG: 5 TABLET ORAL at 08:46

## 2025-01-23 RX ADMIN — CEFAZOLIN SODIUM 2 G: 2 INJECTION, SOLUTION INTRAVENOUS at 04:53

## 2025-01-23 RX ADMIN — ACETAMINOPHEN 975 MG: 325 TABLET ORAL at 16:39

## 2025-01-23 RX ADMIN — HYDROMORPHONE HYDROCHLORIDE 0.2 MG: 0.2 INJECTION, SOLUTION INTRAMUSCULAR; INTRAVENOUS; SUBCUTANEOUS at 12:34

## 2025-01-23 RX ADMIN — AMIODARONE HYDROCHLORIDE 200 MG: 200 TABLET ORAL at 08:27

## 2025-01-23 RX ADMIN — IPRATROPIUM BROMIDE AND ALBUTEROL SULFATE 3 ML: .5; 3 SOLUTION RESPIRATORY (INHALATION) at 08:50

## 2025-01-23 RX ADMIN — MIRTAZAPINE 15 MG: 15 TABLET, FILM COATED ORAL at 21:23

## 2025-01-23 RX ADMIN — POLYETHYLENE GLYCOL 3350 17 G: 17 POWDER, FOR SOLUTION ORAL at 21:23

## 2025-01-23 RX ADMIN — ALBUMIN HUMAN 25 G: 0.25 SOLUTION INTRAVENOUS at 05:55

## 2025-01-23 RX ADMIN — IPRATROPIUM BROMIDE AND ALBUTEROL SULFATE 3 ML: .5; 3 SOLUTION RESPIRATORY (INHALATION) at 20:40

## 2025-01-23 RX ADMIN — ALBUMIN HUMAN 25 G: 0.25 SOLUTION INTRAVENOUS at 13:52

## 2025-01-23 RX ADMIN — MAGNESIUM SULFATE HEPTAHYDRATE 2 G: 40 INJECTION, SOLUTION INTRAVENOUS at 08:46

## 2025-01-23 RX ADMIN — BUDESONIDE 0.5 MG: 0.5 INHALANT RESPIRATORY (INHALATION) at 08:49

## 2025-01-23 RX ADMIN — SENNOSIDES AND DOCUSATE SODIUM 2 TABLET: 50; 8.6 TABLET ORAL at 08:27

## 2025-01-23 RX ADMIN — BUDESONIDE 0.5 MG: 0.5 INHALANT RESPIRATORY (INHALATION) at 20:41

## 2025-01-23 RX ADMIN — HYDROMORPHONE HYDROCHLORIDE 0.2 MG: 0.2 INJECTION, SOLUTION INTRAMUSCULAR; INTRAVENOUS; SUBCUTANEOUS at 07:38

## 2025-01-23 RX ADMIN — POTASSIUM PHOSPHATE, MONOBASIC AND POTASSIUM PHOSPHATE, DIBASIC 21 MMOL: 224; 236 INJECTION, SOLUTION, CONCENTRATE INTRAVENOUS at 09:39

## 2025-01-23 ASSESSMENT — PAIN SCALES - GENERAL
PAINLEVEL_OUTOF10: 0 - NO PAIN
PAINLEVEL_OUTOF10: 9
PAINLEVEL_OUTOF10: 0 - NO PAIN
PAINLEVEL_OUTOF10: 7
PAINLEVEL_OUTOF10: 5 - MODERATE PAIN
PAINLEVEL_OUTOF10: 2
PAINLEVEL_OUTOF10: 0 - NO PAIN
PAINLEVEL_OUTOF10: 8
PAINLEVEL_OUTOF10: 8

## 2025-01-23 ASSESSMENT — PAIN - FUNCTIONAL ASSESSMENT
PAIN_FUNCTIONAL_ASSESSMENT: 0-10
PAIN_FUNCTIONAL_ASSESSMENT: CPOT (CRITICAL CARE PAIN OBSERVATION TOOL)
PAIN_FUNCTIONAL_ASSESSMENT: 0-10

## 2025-01-23 ASSESSMENT — PAIN DESCRIPTION - LOCATION
LOCATION: BACK
LOCATION: CHEST

## 2025-01-23 ASSESSMENT — PAIN DESCRIPTION - ORIENTATION: ORIENTATION: LEFT
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
## 2025-01-23 NOTE — PROGRESS NOTES
"Parish Valerio \"Bret\" is a 74 y.o. male on day 2 of admission presenting with Coronary artery disease involving native coronary artery of native heart.      Objective     Physical Exam    Last Recorded Vitals  Blood pressure 137/61, pulse 73, temperature 36.9 °C (98.4 °F), temperature source Temporal, resp. rate 17, height 1.753 m (5' 9\"), weight 61.7 kg (136 lb 0.4 oz), SpO2 100%.  Intake/Output last 3 Shifts:  I/O last 3 completed shifts:  In: 5524.1 (89.5 mL/kg) [P.O.:500; I.V.:614.7 (10 mL/kg); Blood:100; IV Piggyback:4309.4]  Out: 4025 (65.2 mL/kg) [Urine:2845 (1.3 mL/kg/hr); Chest Tube:1180]  Weight: 61.7 kg       Assessment/Plan   Assessment & Plan  Coronary artery disease involving native coronary artery of native heart    Coronary artery disease involving native coronary artery of native heart, unspecified whether angina present    Anticoagulant long-term use    73 y/o male s/p MIDCABx2, LIMA-LAD and PRINCE-DIAG on 1/21, ef ~50%. Extubated overnight on POD#0.    Increase in oxygen requirements/WOB overnight POD#1.    1) S/P OPCABGx2 (lima-lad, prince-diag)   ASA, STATIN.   Colchicine  2) Pulmonary atelectasis/Hypoxia   CPAP   Diuresis   Image right pleural space   Reviewed with Gera rodgers  3) Hx of dysphagia  4) Hx of PAF   Home amiodarone  5) Acute blood loss anemia    Due to the high probability of life threatening clinical decompensation, the patient required critical care time evaluating and managing this patient.  Critical care time included obtaining a history, examining the patient, ordering and reviewing studies, discussing, developing, and implementing a management plan, evaluating the patient's response to treatment, and discussion with other care team providers. I saw and evaluated the patient myself. I reviewed the provider's documentation and discussed the patient with the provider. Critical care time was performed exclusive of billable procedures.    Critical Care Time: 40 minutes     Frederick CASTELLON " MD Joshua
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
## 2025-01-23 NOTE — PROGRESS NOTES
"Occupational Therapy                 Therapy Communication Note    Patient Name: Parish Valerio \"Bret\"  MRN: [id number]  Department: [facility] CTICU  Room: 16/16-A  Today's Date: 1/23/2025     Discipline: Occupational Therapy    OT Missed Visit: Yes     Missed Visit Reason: Missed Visit Reason:  (Communicated with RN. Pt with recent increase in O2 requirement and WOB requiring BiPap. BiPap just recently removed and request from RN to hold OT at this time.)    Missed Time: Attempt    "

## 2025-01-23 NOTE — CARE PLAN
The patient's goals for the shift include reduce oxygen demands    The clinical goals for the shift include reduce oxygen demands and remain HD stable      Problem: Discharge Planning  Goal: Discharge to home or other facility with appropriate resources  Outcome: Progressing     Problem: Chronic Conditions and Co-morbidities  Goal: Patient's chronic conditions and co-morbidity symptoms are monitored and maintained or improved  Outcome: Progressing

## 2025-01-23 NOTE — PROGRESS NOTES
01/23/25 0928   Discharge Planning   Home or Post Acute Services In home services   Type of Home Care Services Home nursing visits;Home OT;Home PT   Expected Discharge Disposition Home   ([facility])   Patient Choice   Provider Choice list and CMS website (https://medicare.gov/care-compare#search) for post-acute Quality and Resource Measure Data were provided and reviewed with: Patient;Family  (AOC obtained from fiance and patient on 01/22)     Adrianna Guthrie (LSW, MSW)

## 2025-01-24 ENCOUNTER — APPOINTMENT (OUTPATIENT)
Dept: RADIOLOGY | Facility: HOSPITAL | Age: 75
DRG: 235 | End: 2025-01-24
Payer: MEDICARE

## 2025-01-24 LAB
ALBUMIN SERPL BCP-MCNC: 4.2 G/DL (ref 3.4–5)
ALBUMIN SERPL BCP-MCNC: 4.6 G/DL (ref 3.4–5)
ANION GAP BLDA CALCULATED.4IONS-SCNC: 10 MMO/L (ref 10–25)
ANION GAP BLDA CALCULATED.4IONS-SCNC: ABNORMAL MMOL/L
ANION GAP SERPL CALC-SCNC: 12 MMOL/L (ref 10–20)
ANION GAP SERPL CALC-SCNC: 14 MMOL/L (ref 10–20)
BASE EXCESS BLDA CALC-SCNC: -0.8 MMOL/L (ref -2–3)
BASE EXCESS BLDA CALC-SCNC: 0.1 MMOL/L (ref -2–3)
BASE EXCESS BLDA CALC-SCNC: 1.8 MMOL/L (ref -2–3)
BASE EXCESS BLDA CALC-SCNC: 2.5 MMOL/L (ref -2–3)
BODY TEMPERATURE: 37 DEGREES CELSIUS
BUN SERPL-MCNC: 16 MG/DL (ref 6–23)
BUN SERPL-MCNC: 18 MG/DL (ref 6–23)
CA-I BLD-SCNC: 1.13 MMOL/L (ref 1.1–1.33)
CA-I BLD-SCNC: 1.15 MMOL/L (ref 1.1–1.33)
CA-I BLDA-SCNC: 1.11 MMOL/L (ref 1.1–1.33)
CA-I BLDA-SCNC: 1.16 MMOL/L (ref 1.1–1.33)
CA-I BLDA-SCNC: 1.17 MMOL/L (ref 1.1–1.33)
CA-I BLDA-SCNC: 1.2 MMOL/L (ref 1.1–1.33)
CALCIUM SERPL-MCNC: 8.7 MG/DL (ref 8.6–10.6)
CALCIUM SERPL-MCNC: 8.9 MG/DL (ref 8.6–10.6)
CHLORIDE BLDA-SCNC: 105 MMOL/L (ref 98–107)
CHLORIDE BLDA-SCNC: 106 MMOL/L (ref 98–107)
CHLORIDE BLDA-SCNC: 107 MMOL/L (ref 98–107)
CHLORIDE BLDA-SCNC: ABNORMAL MMOL/L
CHLORIDE SERPL-SCNC: 103 MMOL/L (ref 98–107)
CHLORIDE SERPL-SCNC: 104 MMOL/L (ref 98–107)
CO2 SERPL-SCNC: 26 MMOL/L (ref 21–32)
CO2 SERPL-SCNC: 29 MMOL/L (ref 21–32)
CREAT SERPL-MCNC: 0.73 MG/DL (ref 0.5–1.3)
CREAT SERPL-MCNC: 0.76 MG/DL (ref 0.5–1.3)
EGFRCR SERPLBLD CKD-EPI 2021: >90 ML/MIN/1.73M*2
EGFRCR SERPLBLD CKD-EPI 2021: >90 ML/MIN/1.73M*2
ERYTHROCYTE [DISTWIDTH] IN BLOOD BY AUTOMATED COUNT: 13.4 % (ref 11.5–14.5)
ERYTHROCYTE [DISTWIDTH] IN BLOOD BY AUTOMATED COUNT: 13.8 % (ref 11.5–14.5)
GLUCOSE BLD MANUAL STRIP-MCNC: 100 MG/DL (ref 74–99)
GLUCOSE BLD MANUAL STRIP-MCNC: 105 MG/DL (ref 74–99)
GLUCOSE BLD MANUAL STRIP-MCNC: 109 MG/DL (ref 74–99)
GLUCOSE BLDA-MCNC: 103 MG/DL (ref 74–99)
GLUCOSE BLDA-MCNC: 117 MG/DL (ref 74–99)
GLUCOSE BLDA-MCNC: 86 MG/DL (ref 74–99)
GLUCOSE BLDA-MCNC: 92 MG/DL (ref 74–99)
GLUCOSE SERPL-MCNC: 108 MG/DL (ref 74–99)
GLUCOSE SERPL-MCNC: 92 MG/DL (ref 74–99)
HCO3 BLDA-SCNC: 23.4 MMOL/L (ref 22–26)
HCO3 BLDA-SCNC: 24.7 MMOL/L (ref 22–26)
HCO3 BLDA-SCNC: 26.6 MMOL/L (ref 22–26)
HCO3 BLDA-SCNC: 27.2 MMOL/L (ref 22–26)
HCT VFR BLD AUTO: 21.9 % (ref 41–52)
HCT VFR BLD AUTO: 26.7 % (ref 41–52)
HCT VFR BLD EST: 23 % (ref 41–52)
HCT VFR BLD EST: 24 % (ref 41–52)
HCT VFR BLD EST: 28 % (ref 41–52)
HCT VFR BLD EST: 28 % (ref 41–52)
HGB BLD-MCNC: 7.5 G/DL (ref 13.5–17.5)
HGB BLD-MCNC: 9.5 G/DL (ref 13.5–17.5)
HGB BLDA-MCNC: 7.8 G/DL (ref 13.5–17.5)
HGB BLDA-MCNC: 8 G/DL (ref 13.5–17.5)
HGB BLDA-MCNC: 9.4 G/DL (ref 13.5–17.5)
HGB BLDA-MCNC: 9.4 G/DL (ref 13.5–17.5)
INHALED O2 CONCENTRATION: 40 %
INHALED O2 CONCENTRATION: 40 %
INHALED O2 CONCENTRATION: 44 %
INHALED O2 CONCENTRATION: 44 %
LACTATE BLDA-SCNC: 0.6 MMOL/L (ref 0.4–2)
LACTATE BLDA-SCNC: 0.6 MMOL/L (ref 0.4–2)
LACTATE BLDA-SCNC: 0.9 MMOL/L (ref 0.4–2)
LACTATE BLDA-SCNC: 1.2 MMOL/L (ref 0.4–2)
MAGNESIUM SERPL-MCNC: 2.01 MG/DL (ref 1.6–2.4)
MAGNESIUM SERPL-MCNC: 2.11 MG/DL (ref 1.6–2.4)
MCH RBC QN AUTO: 32.1 PG (ref 26–34)
MCH RBC QN AUTO: 32.2 PG (ref 26–34)
MCHC RBC AUTO-ENTMCNC: 34.2 G/DL (ref 32–36)
MCHC RBC AUTO-ENTMCNC: 35.6 G/DL (ref 32–36)
MCV RBC AUTO: 90 FL (ref 80–100)
MCV RBC AUTO: 94 FL (ref 80–100)
NRBC BLD-RTO: 0 /100 WBCS (ref 0–0)
NRBC BLD-RTO: 0 /100 WBCS (ref 0–0)
OXYHGB MFR BLDA: 97.1 % (ref 94–98)
OXYHGB MFR BLDA: 97.2 % (ref 94–98)
OXYHGB MFR BLDA: 97.4 % (ref 94–98)
OXYHGB MFR BLDA: 97.4 % (ref 94–98)
PCO2 BLDA: 36 MM HG (ref 38–42)
PCO2 BLDA: 39 MM HG (ref 38–42)
PCO2 BLDA: 42 MM HG (ref 38–42)
PCO2 BLDA: 42 MM HG (ref 38–42)
PH BLDA: 7.41 PH (ref 7.38–7.42)
PH BLDA: 7.41 PH (ref 7.38–7.42)
PH BLDA: 7.42 PH (ref 7.38–7.42)
PH BLDA: 7.42 PH (ref 7.38–7.42)
PHOSPHATE SERPL-MCNC: 2.5 MG/DL (ref 2.5–4.9)
PHOSPHATE SERPL-MCNC: 2.8 MG/DL (ref 2.5–4.9)
PLATELET # BLD AUTO: 114 X10*3/UL (ref 150–450)
PLATELET # BLD AUTO: 85 X10*3/UL (ref 150–450)
PO2 BLDA: 110 MM HG (ref 85–95)
PO2 BLDA: 114 MM HG (ref 85–95)
PO2 BLDA: 140 MM HG (ref 85–95)
PO2 BLDA: 178 MM HG (ref 85–95)
POTASSIUM BLDA-SCNC: 3.7 MMOL/L (ref 3.5–5.3)
POTASSIUM BLDA-SCNC: 3.8 MMOL/L (ref 3.5–5.3)
POTASSIUM BLDA-SCNC: 3.8 MMOL/L (ref 3.5–5.3)
POTASSIUM BLDA-SCNC: 4.2 MMOL/L (ref 3.5–5.3)
POTASSIUM SERPL-SCNC: 3.6 MMOL/L (ref 3.5–5.3)
POTASSIUM SERPL-SCNC: 4.2 MMOL/L (ref 3.5–5.3)
RBC # BLD AUTO: 2.33 X10*6/UL (ref 4.5–5.9)
RBC # BLD AUTO: 2.96 X10*6/UL (ref 4.5–5.9)
SAO2 % BLDA: 100 % (ref 94–100)
SAO2 % BLDA: 99 % (ref 94–100)
SODIUM BLDA-SCNC: 136 MMOL/L (ref 136–145)
SODIUM BLDA-SCNC: 137 MMOL/L (ref 136–145)
SODIUM BLDA-SCNC: 138 MMOL/L (ref 136–145)
SODIUM BLDA-SCNC: 138 MMOL/L (ref 136–145)
SODIUM SERPL-SCNC: 140 MMOL/L (ref 136–145)
SODIUM SERPL-SCNC: 140 MMOL/L (ref 136–145)
WBC # BLD AUTO: 12.6 X10*3/UL (ref 4.4–11.3)
WBC # BLD AUTO: 14.9 X10*3/UL (ref 4.4–11.3)

## 2025-01-24 PROCEDURE — 2500000004 HC RX 250 GENERAL PHARMACY W/ HCPCS (ALT 636 FOR OP/ED)

## 2025-01-24 PROCEDURE — 82435 ASSAY OF BLOOD CHLORIDE: CPT

## 2025-01-24 PROCEDURE — 36430 TRANSFUSION BLD/BLD COMPNT: CPT

## 2025-01-24 PROCEDURE — 2500000002 HC RX 250 W HCPCS SELF ADMINISTERED DRUGS (ALT 637 FOR MEDICARE OP, ALT 636 FOR OP/ED)

## 2025-01-24 PROCEDURE — 83735 ASSAY OF MAGNESIUM: CPT

## 2025-01-24 PROCEDURE — 2020000001 HC ICU ROOM DAILY

## 2025-01-24 PROCEDURE — 94640 AIRWAY INHALATION TREATMENT: CPT

## 2025-01-24 PROCEDURE — 85027 COMPLETE CBC AUTOMATED: CPT

## 2025-01-24 PROCEDURE — 94668 MNPJ CHEST WALL SBSQ: CPT

## 2025-01-24 PROCEDURE — 82947 ASSAY GLUCOSE BLOOD QUANT: CPT

## 2025-01-24 PROCEDURE — 97116 GAIT TRAINING THERAPY: CPT | Mod: GP

## 2025-01-24 PROCEDURE — 97530 THERAPEUTIC ACTIVITIES: CPT | Mod: GO

## 2025-01-24 PROCEDURE — 2500000001 HC RX 250 WO HCPCS SELF ADMINISTERED DRUGS (ALT 637 FOR MEDICARE OP): Performed by: NURSE PRACTITIONER

## 2025-01-24 PROCEDURE — 2500000001 HC RX 250 WO HCPCS SELF ADMINISTERED DRUGS (ALT 637 FOR MEDICARE OP)

## 2025-01-24 PROCEDURE — 2500000005 HC RX 250 GENERAL PHARMACY W/O HCPCS

## 2025-01-24 PROCEDURE — 97530 THERAPEUTIC ACTIVITIES: CPT | Mod: GP

## 2025-01-24 PROCEDURE — 2500000002 HC RX 250 W HCPCS SELF ADMINISTERED DRUGS (ALT 637 FOR MEDICARE OP, ALT 636 FOR OP/ED): Performed by: NURSE PRACTITIONER

## 2025-01-24 PROCEDURE — 71045 X-RAY EXAM CHEST 1 VIEW: CPT | Performed by: RADIOLOGY

## 2025-01-24 PROCEDURE — P9016 RBC LEUKOCYTES REDUCED: HCPCS

## 2025-01-24 PROCEDURE — 97165 OT EVAL LOW COMPLEX 30 MIN: CPT | Mod: GO

## 2025-01-24 PROCEDURE — 37799 UNLISTED PX VASCULAR SURGERY: CPT

## 2025-01-24 PROCEDURE — 2500000004 HC RX 250 GENERAL PHARMACY W/ HCPCS (ALT 636 FOR OP/ED): Mod: JZ,TB | Performed by: STUDENT IN AN ORGANIZED HEALTH CARE EDUCATION/TRAINING PROGRAM

## 2025-01-24 PROCEDURE — 86901 BLOOD TYPING SEROLOGIC RH(D): CPT

## 2025-01-24 PROCEDURE — P9045 ALBUMIN (HUMAN), 5%, 250 ML: HCPCS | Mod: JZ,TB

## 2025-01-24 PROCEDURE — 86923 COMPATIBILITY TEST ELECTRIC: CPT

## 2025-01-24 PROCEDURE — 71045 X-RAY EXAM CHEST 1 VIEW: CPT

## 2025-01-24 PROCEDURE — P9045 ALBUMIN (HUMAN), 5%, 250 ML: HCPCS | Mod: JZ,TB | Performed by: STUDENT IN AN ORGANIZED HEALTH CARE EDUCATION/TRAINING PROGRAM

## 2025-01-24 PROCEDURE — 99291 CRITICAL CARE FIRST HOUR: CPT | Performed by: ANESTHESIOLOGY

## 2025-01-24 PROCEDURE — 82330 ASSAY OF CALCIUM: CPT

## 2025-01-24 RX ORDER — ALBUMIN HUMAN 50 G/1000ML
12.5 SOLUTION INTRAVENOUS ONCE
Status: COMPLETED | OUTPATIENT
Start: 2025-01-24 | End: 2025-01-25

## 2025-01-24 RX ORDER — ALBUMIN HUMAN 50 G/1000ML
SOLUTION INTRAVENOUS
Status: COMPLETED
Start: 2025-01-24 | End: 2025-01-24

## 2025-01-24 RX ORDER — LANOLIN ALCOHOL/MO/W.PET/CERES
400 CREAM (GRAM) TOPICAL ONCE
Status: COMPLETED | OUTPATIENT
Start: 2025-01-24 | End: 2025-01-24

## 2025-01-24 RX ORDER — ALBUMIN HUMAN 50 G/1000ML
12.5 SOLUTION INTRAVENOUS ONCE
Status: COMPLETED | OUTPATIENT
Start: 2025-01-24 | End: 2025-01-24

## 2025-01-24 RX ORDER — FUROSEMIDE 10 MG/ML
20 INJECTION INTRAMUSCULAR; INTRAVENOUS ONCE
Status: COMPLETED | OUTPATIENT
Start: 2025-01-24 | End: 2025-01-24

## 2025-01-24 RX ORDER — FUROSEMIDE 10 MG/ML
INJECTION INTRAMUSCULAR; INTRAVENOUS
Status: COMPLETED
Start: 2025-01-24 | End: 2025-01-24

## 2025-01-24 RX ADMIN — ATORVASTATIN CALCIUM 80 MG: 80 TABLET, FILM COATED ORAL at 21:44

## 2025-01-24 RX ADMIN — IPRATROPIUM BROMIDE AND ALBUTEROL SULFATE 3 ML: .5; 3 SOLUTION RESPIRATORY (INHALATION) at 21:09

## 2025-01-24 RX ADMIN — HEPARIN SODIUM 5000 UNITS: 5000 INJECTION, SOLUTION INTRAVENOUS; SUBCUTANEOUS at 00:18

## 2025-01-24 RX ADMIN — POTASSIUM CHLORIDE 40 MEQ: 400 INJECTION, SOLUTION INTRAVENOUS at 03:45

## 2025-01-24 RX ADMIN — LAMOTRIGINE 200 MG: 150 TABLET ORAL at 15:37

## 2025-01-24 RX ADMIN — COLCHICINE 0.6 MG: 0.6 TABLET ORAL at 09:10

## 2025-01-24 RX ADMIN — Medication 2 L/MIN: at 15:22

## 2025-01-24 RX ADMIN — IPRATROPIUM BROMIDE AND ALBUTEROL SULFATE 3 ML: .5; 3 SOLUTION RESPIRATORY (INHALATION) at 15:22

## 2025-01-24 RX ADMIN — IPRATROPIUM BROMIDE AND ALBUTEROL SULFATE 3 ML: .5; 3 SOLUTION RESPIRATORY (INHALATION) at 03:55

## 2025-01-24 RX ADMIN — ALBUMIN HUMAN 12.5 G: 50 SOLUTION INTRAVENOUS at 22:26

## 2025-01-24 RX ADMIN — ACETAMINOPHEN 975 MG: 325 TABLET ORAL at 09:09

## 2025-01-24 RX ADMIN — ACETAMINOPHEN 975 MG: 325 TABLET ORAL at 15:37

## 2025-01-24 RX ADMIN — MIRTAZAPINE 15 MG: 15 TABLET, FILM COATED ORAL at 21:44

## 2025-01-24 RX ADMIN — ARIPIPRAZOLE 10 MG: 5 TABLET ORAL at 09:10

## 2025-01-24 RX ADMIN — ACETAMINOPHEN 975 MG: 325 TABLET ORAL at 03:45

## 2025-01-24 RX ADMIN — OXYCODONE HYDROCHLORIDE 10 MG: 10 TABLET ORAL at 09:18

## 2025-01-24 RX ADMIN — HEPARIN SODIUM 5000 UNITS: 5000 INJECTION, SOLUTION INTRAVENOUS; SUBCUTANEOUS at 23:42

## 2025-01-24 RX ADMIN — SENNOSIDES AND DOCUSATE SODIUM 2 TABLET: 50; 8.6 TABLET ORAL at 09:09

## 2025-01-24 RX ADMIN — ALBUMIN HUMAN 12.5 G: 0.05 INJECTION, SOLUTION INTRAVENOUS at 23:41

## 2025-01-24 RX ADMIN — ACETAMINOPHEN 975 MG: 325 TABLET ORAL at 21:44

## 2025-01-24 RX ADMIN — IPRATROPIUM BROMIDE AND ALBUTEROL SULFATE 3 ML: .5; 3 SOLUTION RESPIRATORY (INHALATION) at 08:23

## 2025-01-24 RX ADMIN — Medication 5 MG: at 21:44

## 2025-01-24 RX ADMIN — Medication 1 L/MIN: at 21:09

## 2025-01-24 RX ADMIN — PANTOPRAZOLE SODIUM 40 MG: 40 TABLET, DELAYED RELEASE ORAL at 06:39

## 2025-01-24 RX ADMIN — BUDESONIDE 0.5 MG: 0.5 INHALANT RESPIRATORY (INHALATION) at 21:15

## 2025-01-24 RX ADMIN — LAMOTRIGINE 150 MG: 150 TABLET ORAL at 21:44

## 2025-01-24 RX ADMIN — ASPIRIN 81 MG CHEWABLE TABLET 81 MG: 81 TABLET CHEWABLE at 09:09

## 2025-01-24 RX ADMIN — Medication 2 L/MIN: at 11:00

## 2025-01-24 RX ADMIN — MAGNESIUM OXIDE TAB 400 MG (241.3 MG ELEMENTAL MG) 400 MG: 400 (241.3 MG) TAB at 15:37

## 2025-01-24 RX ADMIN — SENNOSIDES AND DOCUSATE SODIUM 2 TABLET: 50; 8.6 TABLET ORAL at 21:45

## 2025-01-24 RX ADMIN — ALBUMIN HUMAN 12.5 G: 0.05 INJECTION, SOLUTION INTRAVENOUS at 22:26

## 2025-01-24 RX ADMIN — POLYETHYLENE GLYCOL 3350 17 G: 17 POWDER, FOR SOLUTION ORAL at 09:10

## 2025-01-24 RX ADMIN — POLYETHYLENE GLYCOL 3350 17 G: 17 POWDER, FOR SOLUTION ORAL at 21:45

## 2025-01-24 RX ADMIN — FUROSEMIDE 20 MG: 10 INJECTION, SOLUTION INTRAVENOUS at 09:40

## 2025-01-24 RX ADMIN — BUDESONIDE 0.5 MG: 0.5 INHALANT RESPIRATORY (INHALATION) at 08:23

## 2025-01-24 RX ADMIN — HEPARIN SODIUM 5000 UNITS: 5000 INJECTION, SOLUTION INTRAVENOUS; SUBCUTANEOUS at 09:09

## 2025-01-24 RX ADMIN — FUROSEMIDE 20 MG: 10 INJECTION INTRAMUSCULAR; INTRAVENOUS at 09:40

## 2025-01-24 RX ADMIN — AMIODARONE HYDROCHLORIDE 200 MG: 200 TABLET ORAL at 09:09

## 2025-01-24 RX ADMIN — BISACODYL 10 MG: 10 SUPPOSITORY RECTAL at 09:10

## 2025-01-24 RX ADMIN — POTASSIUM PHOSPHATE, MONOBASIC POTASSIUM PHOSPHATE, DIBASIC 21 MMOL: 224; 236 INJECTION, SOLUTION, CONCENTRATE INTRAVENOUS at 09:40

## 2025-01-24 RX ADMIN — HEPARIN SODIUM 5000 UNITS: 5000 INJECTION, SOLUTION INTRAVENOUS; SUBCUTANEOUS at 15:38

## 2025-01-24 ASSESSMENT — PAIN SCALES - GENERAL
PAINLEVEL_OUTOF10: 0 - NO PAIN
PAINLEVEL_OUTOF10: 8
PAINLEVEL_OUTOF10: 0 - NO PAIN

## 2025-01-24 ASSESSMENT — COGNITIVE AND FUNCTIONAL STATUS - GENERAL
DRESSING REGULAR UPPER BODY CLOTHING: A LITTLE
MOBILITY SCORE: 16
DAILY ACTIVITIY SCORE: 17
TURNING FROM BACK TO SIDE WHILE IN FLAT BAD: A LITTLE
WALKING IN HOSPITAL ROOM: A LITTLE
STANDING UP FROM CHAIR USING ARMS: A LITTLE
TOILETING: A LOT
CLIMB 3 TO 5 STEPS WITH RAILING: TOTAL
PATIENT BASELINE BEDBOUND: UNABLE TO ASSESS AT THIS TIME
MOVING TO AND FROM BED TO CHAIR: A LITTLE
PERSONAL GROOMING: A LITTLE
HELP NEEDED FOR BATHING: A LOT
DRESSING REGULAR LOWER BODY CLOTHING: A LITTLE
MOVING FROM LYING ON BACK TO SITTING ON SIDE OF FLAT BED WITH BEDRAILS: A LITTLE

## 2025-01-24 ASSESSMENT — PAIN - FUNCTIONAL ASSESSMENT
PAIN_FUNCTIONAL_ASSESSMENT: 0-10

## 2025-01-24 ASSESSMENT — ACTIVITIES OF DAILY LIVING (ADL)
ADL_ASSISTANCE: INDEPENDENT
BATHING_ASSISTANCE: MINIMAL

## 2025-01-24 ASSESSMENT — PAIN DESCRIPTION - ORIENTATION: ORIENTATION: LEFT

## 2025-01-24 ASSESSMENT — PAIN DESCRIPTION - LOCATION: LOCATION: CHEST

## 2025-01-24 NOTE — PROGRESS NOTES
"Parish Valerio \"Bret\" is a 74 y.o. male on day 3 of admission presenting with Coronary artery disease involving native coronary artery of native heart.      Objective     Physical Exam    Last Recorded Vitals  Blood pressure 102/54, pulse 77, temperature 36.8 °C (98.2 °F), temperature source Temporal, resp. rate 19, height 1.753 m (5' 9\"), weight 61.7 kg (136 lb 0.4 oz), SpO2 98%.  Intake/Output last 3 Shifts:  I/O last 3 completed shifts:  In: 1361.1 (22.1 mL/kg) [I.V.:81.1 (1.3 mL/kg); Blood:100; IV Piggyback:1180]  Out: 4585 (74.3 mL/kg) [Urine:3755 (1.7 mL/kg/hr); Chest Tube:830]  Weight: 61.7 kg       Assessment/Plan   Assessment & Plan  Coronary artery disease involving native coronary artery of native heart    Coronary artery disease involving native coronary artery of native heart, unspecified whether angina present    Anticoagulant long-term use    75 y/o male s/p MIDCABx2, LIMA-LAD and PRINCE-DIAG on 1/21, ef ~50%. Extubated overnight on POD#0.     Increase in oxygen requirements/WOB overnight POD#1.     1) S/P OPCABGx2 (lima-lad, prince-diag)              ASA, STATIN. Clopidogrel to be discussed with primary surgeon.              Colchicine  2) Pulmonary atelectasis/Hypoxia              CPAP              Diuresis              Image right pleural space - improved on CXR today 1/24 +/-tap pending ultrasound              Reviewed with Gera rodgers  3) Hx of dysphagia   Recently passed formal swallow  4) Hx of PAF              Home amiodarone  5) Acute blood loss anemia  6) Prophylaxis   Subcu heparin     Due to the high probability of life threatening clinical decompensation, the patient required critical care time evaluating and managing this patient.  Critical care time included obtaining a history, examining the patient, ordering and reviewing studies, discussing, developing, and implementing a management plan, evaluating the patient's response to treatment, and discussion with other care team providers. I " saw and evaluated the patient myself. I reviewed the provider's documentation and discussed the patient with the provider. Critical care time was performed exclusive of billable procedures.     Critical Care Time: 40 minutes     Frederick Lewis MD

## 2025-01-24 NOTE — CARE PLAN
The patient's goals for the shift include Eat some food and get up to chair    The clinical goals for the shift include will remain HD stable and get up to chair      Problem: Discharge Planning  Goal: Discharge to home or other facility with appropriate resources  Outcome: Progressing     Problem: Chronic Conditions and Co-morbidities  Goal: Patient's chronic conditions and co-morbidity symptoms are monitored and maintained or improved  Outcome: Progressing     Problem: Skin  Goal: Decreased wound size/increased tissue granulation at next dressing change  Outcome: Progressing  Goal: Participates in plan/prevention/treatment measures  Outcome: Progressing  Goal: Prevent/manage excess moisture  Outcome: Progressing  Goal: Prevent/minimize sheer/friction injuries  Outcome: Progressing  Goal: Promote/optimize nutrition  Outcome: Progressing  Goal: Promote skin healing  Outcome: Progressing

## 2025-01-24 NOTE — PROGRESS NOTES
"Physical Therapy    Physical Therapy Treatment    Patient Name: Parish Valerio \"Bret\"  MRN: [id number]  Department: [facility] CT[facility]  Room: 16/16-A  Today's Date: 1/24/2025  Time Calculation  Start Time: 1440  Stop Time: 1503  Time Calculation (min): 23 min    Assessment/Plan   PT Assessment  PT Assessment Results: Decreased strength, Decreased endurance, Impaired balance, Decreased mobility, Pain  Rehab Prognosis: Good  Barriers to Discharge Home: Physical needs  Physical Needs: High falls risk due to function or environment  Evaluation/Treatment Tolerance: Treatment limited secondary to medical complications (Comment)  Medical Staff Made Aware: Yes  End of Session Communication: Bedside nurse  End of Session Patient Position: Bed, 3 rail up, Alarm off, not on at start of session  PT Plan  Inpatient/Swing Bed or Outpatient: Inpatient  PT Plan  Treatment/Interventions: Bed mobility, Transfer training, Gait training, Balance training, Strengthening, Endurance training, Therapeutic exercise, Therapeutic activity  PT Plan: Ongoing PT  PT Frequency: 5 times per week  PT Discharge Recommendations: Low intensity level of continued care  Equipment Recommended upon Discharge: Wheeled walker  PT Recommended Transfer Status: Assist x1, Assistive device  PT - OK to Discharge: Yes    General Visit Information:   PT  Visit  PT Received On: 01/24/25  Response to Previous Treatment: Patient with no complaints from previous session.  General  Family/Caregiver Present: No  Prior to Session Communication: Bedside nurse  Patient Position Received: Bed, 3 rail up, Alarm off, not on at start of session  Preferred Learning Style: auditory, verbal  General Comment: Pt awake, alert and willing to participate in PT session.  Pt completed bed mobility, sit<>stand transfer and ambulation with thoracic walker.  CGA-minAx1 to complete mobility.  Limited mobility d/t hypotension with change in position.  No symptoms reported. (dawit Sheikh, " IV)    Subjective   Precautions:  Precautions  Medical Precautions: Cardiac precautions, Fall precautions (1.5L)  Precautions Comment: MAP 70-90, SpO2 >92%     Date/Time Vitals Session Patient Position Pulse Resp SpO2 BP MAP (mmHg)    01/24/25 1440 Pre PT  Lying  78  22  97 %  107/46  69     01/24/25 1500 --  --  82  24  96 %  --  --     01/24/25 1503 Post PT  Lying  78  21  99 %  122/49  74      Vital Signs Comment: MAP dropped to 59 upon sitting however recovered quickly.  Upon standing MAP dropped to 53 lowest with increased to 60.  RN increased fluid and was present during ambulation.  With ambulation MAP 59-63 limiting ability to ambulate further.  No symptoms reported.     Objective   Pain:  Pain Assessment  Pain Assessment: 0-10  0-10 (Numeric) Pain Score: 0 - No pain  Cognition:  Cognition  Overall Cognitive Status: Within Functional Limits  Orientation Level: Oriented X4  Activity Tolerance:  Activity Tolerance  Endurance: Tolerates less than 10 min exercise with changes in vital signs  Early Mobility/Exercise Safety Screen: Proceed with mobilization - No exclusion criteria met  Treatments:  Therapeutic Exercise  Therapeutic Exercise Performed: Yes  Therapeutic Exercise Activity 1: 1x10 reps of incentive spirometer: <1000    Therapeutic Activity  Therapeutic Activity Performed: Yes  Therapeutic Activity 1: Static standing ~3 min prior to ambulation to assess hemodynamic stability.    Bed Mobility  Bed Mobility: Yes  Bed Mobility 1  Bed Mobility 1: Supine to sitting  Level of Assistance 1: Minimum assistance (x1)  Bed Mobility Comments 1: HOB elevated, assistance with advacning hips towards EOB  Bed Mobility 2  Bed Mobility  2: Sitting to supine  Level of Assistance 2: Contact guard  Bed Mobility Comments 2: HOB elevated  Bed Mobility 3  Bed Mobility 3:  (Boost towards HOB)  Level of Assistance 3: Dependent, +2  Bed Mobility Comments 3: HOB flat, TAPs utilized    Ambulation/Gait Training  Ambulation/Gait  Training Performed: Yes  Ambulation/Gait Training 1  Surface 1: Level tile  Device 1: Thoracic walker  Assistance 1: Contact guard  Comments/Distance (ft) 1: ~40ft x1 (Limited distance d/t hypotension)  Transfers  Transfer: Yes  Transfer 1  Transfer From 1: Sit to, Stand to  Transfer to 1: Sit, Stand  Technique 1: Sit to stand, Stand to sit  Transfer Device 1: Thoracic walker  Transfer Level of Assistance 1: Contact guard  Trials/Comments 1: Cues for hand placement and proper use of AD    Stairs  Stairs: No    Outcome Measures:  [facility] Basic Mobility  Turning from your back to your side while in a flat bed without using bedrails: A little  Moving from lying on your back to sitting on the side of a flat bed without using bedrails: A little  Moving to and from bed to chair (including a wheelchair): A little  Standing up from a chair using your arms (e.g. wheelchair or bedside chair): A little  To walk in hospital room: A little  Climbing 3-5 steps with railing: Total  Basic Mobility - Total Score: 16    FSS-ICU  Ambulation: Walks <50 feet with any assistance x1 or walks any distance with assistance x2 people  Rolling: Supervision or set-up only  Sitting: Supervision or set-up only  Transfer Sit-to-Stand: Supervision or set-up only  Transfer Supine-to-Sit: Minimal assistance (performs 75% or more of task)  Total Score: 20    Early Mobility/Exercise Safety Screen: Proceed with mobilization - No exclusion criteria met  ICU Mobility Scale: Walking with assistance of 1 person [8]    Education Documentation  Precautions, taught by Roseann Costa PT at 1/24/2025  3:26 PM.  Learner: Patient  Readiness: Acceptance  Method: Explanation, Demonstration  Response: Needs Reinforcement    Body Mechanics, taught by Roseann Costa PT at 1/24/2025  3:26 PM.  Learner: Patient  Readiness: Acceptance  Method: Explanation, Demonstration  Response: Needs Reinforcement    Mobility Training, taught by Roseann Costa PT at 1/24/2025  3:26  PM.  Learner: Patient  Readiness: Acceptance  Method: Explanation, Demonstration  Response: Needs Reinforcement    Education Comments  No comments found.    EDUCATION:       Encounter Problems       Encounter Problems (Active)       Balance       Pt will demonstrate ability to complete standing static/dynamic balance activities with unilateral UE support and no LOB for increase in safety up D/C.  (Progressing)       Start:  01/22/25    Expected End:  02/05/25               Mobility       Pt will demonstrated ability to ambulate >/=150ft with proper form and no balance deficits for safe home going.   (Progressing)       Start:  01/22/25    Expected End:  02/05/25            Pt will demonstrate ability to ascend/descend 2 stairs with unilateral rail and no balance deficits for safe home going.  (Progressing)       Start:  01/22/25    Expected End:  02/05/25               PT Transfers       Pt will demonstrated ability to complete bed mobility and sit<>stand transfers without assistance and use of assistive device to safely return home.  (Progressing)       Start:  01/22/25    Expected End:  02/05/25               Pain - Adult
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
## 2025-01-24 NOTE — PROGRESS NOTES
"Occupational Therapy    Evaluation and Treatment    Patient Name: Parish Valerio \"Araceli"  MRN: [id number]  Today's Date: 1/24/2025  Room: 16/16  Time Calculation  Start Time: 0849  Stop Time: 0917  Time Calculation (min): 28 min    Assessment  IP OT Assessment  OT Assessment: Overall decreased strength and endurance limiting occupational performance.  Prognosis: Good  Barriers to Discharge Home: Physical needs  Physical Needs: Intermittent mobility assistance needed  Medical Staff Made Aware: Yes  End of Session Communication: Bedside nurse  End of Session Patient Position: Up in chair, Alarm off, not on at start of session  Plan:  Inpatient Plan  Treatment Interventions: ADL retraining, UE strengthening/ROM, Functional transfer training, Endurance training, Neuromuscular reeducation, Compensatory technique education  OT Frequency: 2 times per week  OT Discharge Recommendations: Low intensity level of continued care  Equipment Recommended upon Discharge: Wheeled walker  OT Recommended Transfer Status: Minimal assist  OT - OK to Discharge: Yes  OT Assessment  OT Assessment Results: Decreased ADL status, Decreased safe judgment during ADL, Decreased upper extremity strength, Decreased endurance, Decreased functional mobility  Prognosis: Good  Medical Staff Made Aware: Yes    Subjective   Current Problem:  1. Coronary artery disease involving native coronary artery of native heart, unspecified whether angina present  Insert and maintain peripheral IV    Saline lock IV    Inpatient consult to Respiratory Care    Admit to inpatient    Full code    Insert and maintain peripheral IV    Saline lock IV    Inpatient consult to Respiratory Care    Admit to inpatient    Full code    Anesthesia Intraoperative Transesophageal Echocardiogram    Anesthesia Intraoperative Transesophageal Echocardiogram    CANCELED: NPO Diet Except: Sips with meds; Effective now    CANCELED: Height and weight    CANCELED: Surgical preparation    " CANCELED: Bath/Shower with Chlorhexidine Gluconate    CANCELED: NPO Diet Except: Sips with meds; Effective now    CANCELED: Height and weight    CANCELED: Surgical preparation    CANCELED: Bath/Shower with Chlorhexidine Gluconate    CANCELED: Type And Screen      2. 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  Prior to Session Communication: Bedside nurse  Patient Position Received: Bed, 3 rail up, Alarm off, not on at start of session  General Comment: Pt pleasant and cooperative. He puts forth good effort to his tolerance.   Precautions:  Hearing/Visual Limitations: WFL  Medical Precautions: Cardiac precautions, Fall precautions, Oxygen therapy device and L/min, Chest tube (CTx2, 3LO2)  Precautions Comment: MAP 70-90, SpO2 >92%  Vital Signs:   01/24/25 0849 01/24/25 0850   Vital Signs   Vitals Session Pre OT Post OT   Heart Rate 88 90   Resp (!) 28 24   SpO2 93 % 99 %   BP (!) 120/48 140/63   MAP (mmHg) 83 89   BP Method Arterial line Arterial line   Patient Position Lying Sitting   Vital Signs Comment  --  Vitals stable during session.       Pain:  Pain Assessment  Pain Assessment: 0-10  0-10 (Numeric) Pain Score: 0 - No pain  Lines/Tubes/Drains:  Arterial Line 01/21/25 Right Brachial (Active)   Number of days: 3         Objective   Cognition:  Overall Cognitive Status: Within Functional Limits  Orientation Level: Oriented X4           Home Living:  Type of Home: Apartment  Lives With: Significant other  Home Layout: Able to live on main level with  bedroom/bathroom  Home Access: No concerns  Bathroom Shower/Tub: Tub/shower unit  Bathroom Equipment: Shower chair with back, Grab bars in shower   Prior Function:  Level of [place]: Independent with ADLs and functional transfers  Receives Help From:  (SO)  ADL Assistance: Independent  Homemaking Assistance: Independent  Ambulatory Assistance: Independent  Vocational: Retired  Prior Function Comments: (-) drives, (-) falls  IADL History:     ADL:  Grooming Assistance: Stand by  Bathing Assistance: Minimal  UE Dressing Assistance: Stand by  LE Dressing Assistance: Minimal  Toileting Assistance with Device: Moderate  Activity Tolerance:  Endurance: Tolerates 10 - 20 min exercise with multiple rests  Early Mobility/Exercise Safety Screen: Proceed with mobilization - No exclusion criteria met  Balance:     Bed Mobility/Transfers: Bed Mobility/Transfers: Bed Mobility  Bed Mobility: Yes  Bed Mobility 1  Bed Mobility 1: Supine to sitting  Level of Assistance 1: Moderate assistance  Bed Mobility Comments 1: Increased time to complete   and Transfers  Transfer: Yes  Transfer 1  Transfer From 1: Sit to  Transfer to 1: Stand  Technique 1: Sit to stand, Stand to sit  Transfer Device 1: Walker  Transfer Level of Assistance 1: Moderate assistance  Trials/Comments 1: Completed 2x. Rest between. Cues for hand placement and technique.  Transfers 2  Transfer From 2: Bed to  Transfer to 2: Chair with arms  Technique 2: Stand pivot  Transfer Device 2: Walker  Transfer Level of Assistance 2: Minimum assistance  Trials/Comments 2: Cues to fully advance RLE.  IADL's:      Vision:     and Vision - Complex Assessment  Ocular Range of Motion: Within Functional Limits  Sensation:  Light Touch: No apparent deficits  Strength:  Strength Comments: BUE strength 3+/5  Perception:  Inattention/Neglect: Appears intact  Coordination:  Movements are Fluid and Coordinated: Yes   Hand Function:  Hand Function  Gross Grasp: Functional  Coordination:  Functional  Extremities:    ,  ,  , and        Outcome Measures: [facility] Daily Activity  Putting on and taking off regular lower body clothing: A little  Bathing (including washing, rinsing, drying): A lot  Putting on and taking off regular upper body clothing: A little  Toileting, which includes using toilet, bedpan or urinal: A lot  Taking care of personal grooming such as brushing teeth: A little  Eating Meals: None  Daily Activity - Total Score: 17        ICU Mobility Screen  Early Mobility/Exercise Safety Screen: Proceed with mobilization - No exclusion criteria met,          Education Documentation  Body Mechanics, taught by Lidia De Guzman OT at 1/24/2025  3:17 PM.  Learner: Patient  Readiness: Acceptance  Method: Explanation  Response: Verbalizes Understanding    Precautions, taught by Lidia De Guzman OT at 1/24/2025  3:17 PM.  Learner: Patient  Readiness: Acceptance  Method: Explanation  Response: Verbalizes Understanding    ADL Training, taught by Lidia De Guzman OT at 1/24/2025  3:17 PM.  Learner: Patient  Readiness: Acceptance  Method: Explanation  Response: Verbalizes Understanding    Education Comments  No comments found.        Goals:   Encounter Problems       Encounter Problems (Active)       ADLs       Patient will complete lower body dressing with SBA  for donning and doffing all LE clothes in order to increase Indep with task participation.        Start:  01/24/25    Expected End:  02/07/25            Patient will complete toileting, including clothing management and hygiene, with SBA in order to maximize functional Indep with task completion.        Start:  01/24/25    Expected End:  02/07/25               BALANCE       Pt will increase static/dynamic stand to Good to increase safety and indep with functional task completion.         Start:  01/24/25    Expected End:  02/07/25               COGNITION/SAFETY       Pt will identify and incorporate 3 EC/WS strategies into daily routines for  increased safety and Indep.        Start:  01/24/25    Expected End:  02/07/25               EXERCISE/STRENGTHENING       Pt will increase overall BUE strength to 4/5 in order to increase functional task participation.        Start:  01/24/25    Expected End:  02/07/25            Pt will increase endurance to tolerate 15-20min of activity with no more than 1 rest break in order to increase ability to engage in ADL completion.        Start:  01/24/25    Expected End:  02/07/25               MOBILITY       Pt will demo increased functional mobility to tolerate tasks necessary to complete ADL routine with SBA.       Start:  01/24/25    Expected End:  02/07/25               TRANSFERS       Patient will complete functional transfers using least restrictive device with SBA   in order to maximize functional potential and increase safety.        Start:  01/24/25    Expected End:  02/07/25                   Treatment Completed on Evaluation            Therapy/Activity:     Therapeutic Activity  Therapeutic Activity Performed: Yes  Therapeutic Activity 1: Static EOB sit for approx 6min. Needed initial MIN A for retro, progressing to SBA.  Therapeutic Activity 2: Pt ambulated approx 8' forward/reverse with FWW and CGA. Reported fatigue after short ambulation. Slow to complete and cues for posture.          01/24/25 at 3:18 PM   Lidia De Guzman, OT   Rehab Office: [phone]

## 2025-01-25 ENCOUNTER — APPOINTMENT (OUTPATIENT)
Dept: RADIOLOGY | Facility: HOSPITAL | Age: 75
DRG: 235 | End: 2025-01-25
Payer: MEDICARE

## 2025-01-25 LAB
ABO GROUP (TYPE) IN BLOOD: NORMAL
ALBUMIN SERPL BCP-MCNC: 3.9 G/DL (ref 3.4–5)
ALBUMIN SERPL BCP-MCNC: 4.2 G/DL (ref 3.4–5)
ANION GAP BLDA CALCULATED.4IONS-SCNC: 11 MMO/L (ref 10–25)
ANION GAP BLDA CALCULATED.4IONS-SCNC: 9 MMO/L (ref 10–25)
ANION GAP SERPL CALC-SCNC: 14 MMOL/L (ref 10–20)
ANION GAP SERPL CALC-SCNC: 15 MMOL/L (ref 10–20)
ANTIBODY SCREEN: NORMAL
APTT PPP: 29 SECONDS (ref 27–38)
BASE EXCESS BLDA CALC-SCNC: 2.2 MMOL/L (ref -2–3)
BASE EXCESS BLDA CALC-SCNC: 2.3 MMOL/L (ref -2–3)
BLOOD EXPIRATION DATE: NORMAL
BODY TEMPERATURE: 37 DEGREES CELSIUS
BODY TEMPERATURE: 37 DEGREES CELSIUS
BUN SERPL-MCNC: 21 MG/DL (ref 6–23)
BUN SERPL-MCNC: 22 MG/DL (ref 6–23)
CA-I BLD-SCNC: 1.12 MMOL/L (ref 1.1–1.33)
CA-I BLD-SCNC: 1.13 MMOL/L (ref 1.1–1.33)
CA-I BLDA-SCNC: 1.13 MMOL/L (ref 1.1–1.33)
CA-I BLDA-SCNC: 1.14 MMOL/L (ref 1.1–1.33)
CALCIUM SERPL-MCNC: 8.5 MG/DL (ref 8.6–10.6)
CALCIUM SERPL-MCNC: 8.5 MG/DL (ref 8.6–10.6)
CHLORIDE BLDA-SCNC: 103 MMOL/L (ref 98–107)
CHLORIDE BLDA-SCNC: 106 MMOL/L (ref 98–107)
CHLORIDE SERPL-SCNC: 103 MMOL/L (ref 98–107)
CHLORIDE SERPL-SCNC: 103 MMOL/L (ref 98–107)
CO2 SERPL-SCNC: 26 MMOL/L (ref 21–32)
CO2 SERPL-SCNC: 27 MMOL/L (ref 21–32)
CREAT SERPL-MCNC: 0.71 MG/DL (ref 0.5–1.3)
CREAT SERPL-MCNC: 0.83 MG/DL (ref 0.5–1.3)
DISPENSE STATUS: NORMAL
EGFRCR SERPLBLD CKD-EPI 2021: >90 ML/MIN/1.73M*2
EGFRCR SERPLBLD CKD-EPI 2021: >90 ML/MIN/1.73M*2
ERYTHROCYTE [DISTWIDTH] IN BLOOD BY AUTOMATED COUNT: 13.9 % (ref 11.5–14.5)
ERYTHROCYTE [DISTWIDTH] IN BLOOD BY AUTOMATED COUNT: 14 % (ref 11.5–14.5)
ERYTHROCYTE [DISTWIDTH] IN BLOOD BY AUTOMATED COUNT: 14.1 % (ref 11.5–14.5)
GLUCOSE BLD MANUAL STRIP-MCNC: 89 MG/DL (ref 74–99)
GLUCOSE BLD MANUAL STRIP-MCNC: 90 MG/DL (ref 74–99)
GLUCOSE BLD MANUAL STRIP-MCNC: 93 MG/DL (ref 74–99)
GLUCOSE BLDA-MCNC: 118 MG/DL (ref 74–99)
GLUCOSE BLDA-MCNC: 93 MG/DL (ref 74–99)
GLUCOSE SERPL-MCNC: 132 MG/DL (ref 74–99)
GLUCOSE SERPL-MCNC: 96 MG/DL (ref 74–99)
HCO3 BLDA-SCNC: 26.4 MMOL/L (ref 22–26)
HCO3 BLDA-SCNC: 26.5 MMOL/L (ref 22–26)
HCT VFR BLD AUTO: 21.4 % (ref 41–52)
HCT VFR BLD AUTO: 22.7 % (ref 41–52)
HCT VFR BLD AUTO: 24.9 % (ref 41–52)
HCT VFR BLD EST: 23 % (ref 41–52)
HCT VFR BLD EST: 27 % (ref 41–52)
HGB BLD-MCNC: 7.5 G/DL (ref 13.5–17.5)
HGB BLD-MCNC: 7.8 G/DL (ref 13.5–17.5)
HGB BLD-MCNC: 8.5 G/DL (ref 13.5–17.5)
HGB BLDA-MCNC: 7.6 G/DL (ref 13.5–17.5)
HGB BLDA-MCNC: 9 G/DL (ref 13.5–17.5)
INHALED O2 CONCENTRATION: 26 %
INHALED O2 CONCENTRATION: 30 %
INR PPP: 1.3 (ref 0.9–1.1)
LACTATE BLDA-SCNC: 0.9 MMOL/L (ref 0.4–2)
LACTATE BLDA-SCNC: 1.8 MMOL/L (ref 0.4–2)
MAGNESIUM SERPL-MCNC: 1.86 MG/DL (ref 1.6–2.4)
MAGNESIUM SERPL-MCNC: 1.89 MG/DL (ref 1.6–2.4)
MCH RBC QN AUTO: 30.8 PG (ref 26–34)
MCH RBC QN AUTO: 31.8 PG (ref 26–34)
MCH RBC QN AUTO: 31.9 PG (ref 26–34)
MCHC RBC AUTO-ENTMCNC: 34.1 G/DL (ref 32–36)
MCHC RBC AUTO-ENTMCNC: 34.4 G/DL (ref 32–36)
MCHC RBC AUTO-ENTMCNC: 35 G/DL (ref 32–36)
MCV RBC AUTO: 90 FL (ref 80–100)
MCV RBC AUTO: 91 FL (ref 80–100)
MCV RBC AUTO: 93 FL (ref 80–100)
NRBC BLD-RTO: 0 /100 WBCS (ref 0–0)
OXYHGB MFR BLDA: 96.8 % (ref 94–98)
OXYHGB MFR BLDA: 97.8 % (ref 94–98)
PCO2 BLDA: 38 MM HG (ref 38–42)
PCO2 BLDA: 39 MM HG (ref 38–42)
PH BLDA: 7.44 PH (ref 7.38–7.42)
PH BLDA: 7.45 PH (ref 7.38–7.42)
PHOSPHATE SERPL-MCNC: 1.9 MG/DL (ref 2.5–4.9)
PHOSPHATE SERPL-MCNC: 2.6 MG/DL (ref 2.5–4.9)
PLATELET # BLD AUTO: 109 X10*3/UL (ref 150–450)
PLATELET # BLD AUTO: 110 X10*3/UL (ref 150–450)
PLATELET # BLD AUTO: 96 X10*3/UL (ref 150–450)
PO2 BLDA: 109 MM HG (ref 85–95)
PO2 BLDA: 96 MM HG (ref 85–95)
POTASSIUM BLDA-SCNC: 3.4 MMOL/L (ref 3.5–5.3)
POTASSIUM BLDA-SCNC: 3.4 MMOL/L (ref 3.5–5.3)
POTASSIUM SERPL-SCNC: 3.5 MMOL/L (ref 3.5–5.3)
POTASSIUM SERPL-SCNC: 3.6 MMOL/L (ref 3.5–5.3)
PROCALCITONIN SERPL-MCNC: 0.24 NG/ML
PRODUCT BLOOD TYPE: 6200
PRODUCT CODE: NORMAL
PROTHROMBIN TIME: 14.7 SECONDS (ref 9.8–12.8)
RBC # BLD AUTO: 2.35 X10*6/UL (ref 4.5–5.9)
RBC # BLD AUTO: 2.45 X10*6/UL (ref 4.5–5.9)
RBC # BLD AUTO: 2.76 X10*6/UL (ref 4.5–5.9)
RH FACTOR (ANTIGEN D): NORMAL
SAO2 % BLDA: 100 % (ref 94–100)
SAO2 % BLDA: 99 % (ref 94–100)
SODIUM BLDA-SCNC: 137 MMOL/L (ref 136–145)
SODIUM BLDA-SCNC: 138 MMOL/L (ref 136–145)
SODIUM SERPL-SCNC: 140 MMOL/L (ref 136–145)
SODIUM SERPL-SCNC: 140 MMOL/L (ref 136–145)
UNIT ABO: NORMAL
UNIT NUMBER: NORMAL
UNIT RH: NORMAL
UNIT VOLUME: 350
WBC # BLD AUTO: 6.1 X10*3/UL (ref 4.4–11.3)
WBC # BLD AUTO: 6.5 X10*3/UL (ref 4.4–11.3)
WBC # BLD AUTO: 6.9 X10*3/UL (ref 4.4–11.3)
XM INTEP: NORMAL

## 2025-01-25 PROCEDURE — 36430 TRANSFUSION BLD/BLD COMPNT: CPT

## 2025-01-25 PROCEDURE — 2500000001 HC RX 250 WO HCPCS SELF ADMINISTERED DRUGS (ALT 637 FOR MEDICARE OP): Performed by: STUDENT IN AN ORGANIZED HEALTH CARE EDUCATION/TRAINING PROGRAM

## 2025-01-25 PROCEDURE — 94640 AIRWAY INHALATION TREATMENT: CPT

## 2025-01-25 PROCEDURE — 83735 ASSAY OF MAGNESIUM: CPT

## 2025-01-25 PROCEDURE — 85027 COMPLETE CBC AUTOMATED: CPT

## 2025-01-25 PROCEDURE — 2500000005 HC RX 250 GENERAL PHARMACY W/O HCPCS: Performed by: STUDENT IN AN ORGANIZED HEALTH CARE EDUCATION/TRAINING PROGRAM

## 2025-01-25 PROCEDURE — 82435 ASSAY OF BLOOD CHLORIDE: CPT

## 2025-01-25 PROCEDURE — 84145 PROCALCITONIN (PCT): CPT

## 2025-01-25 PROCEDURE — 37799 UNLISTED PX VASCULAR SURGERY: CPT

## 2025-01-25 PROCEDURE — 2500000001 HC RX 250 WO HCPCS SELF ADMINISTERED DRUGS (ALT 637 FOR MEDICARE OP)

## 2025-01-25 PROCEDURE — 2500000004 HC RX 250 GENERAL PHARMACY W/ HCPCS (ALT 636 FOR OP/ED): Mod: JZ

## 2025-01-25 PROCEDURE — 2500000002 HC RX 250 W HCPCS SELF ADMINISTERED DRUGS (ALT 637 FOR MEDICARE OP, ALT 636 FOR OP/ED)

## 2025-01-25 PROCEDURE — 71045 X-RAY EXAM CHEST 1 VIEW: CPT | Performed by: RADIOLOGY

## 2025-01-25 PROCEDURE — P9045 ALBUMIN (HUMAN), 5%, 250 ML: HCPCS | Mod: JZ,TB

## 2025-01-25 PROCEDURE — 82947 ASSAY GLUCOSE BLOOD QUANT: CPT

## 2025-01-25 PROCEDURE — 2500000002 HC RX 250 W HCPCS SELF ADMINISTERED DRUGS (ALT 637 FOR MEDICARE OP, ALT 636 FOR OP/ED): Performed by: NURSE PRACTITIONER

## 2025-01-25 PROCEDURE — 2020000001 HC ICU ROOM DAILY

## 2025-01-25 PROCEDURE — 71250 CT THORAX DX C-: CPT | Performed by: RADIOLOGY

## 2025-01-25 PROCEDURE — 94668 MNPJ CHEST WALL SBSQ: CPT

## 2025-01-25 PROCEDURE — 2500000004 HC RX 250 GENERAL PHARMACY W/ HCPCS (ALT 636 FOR OP/ED)

## 2025-01-25 PROCEDURE — 94669 MECHANICAL CHEST WALL OSCILL: CPT

## 2025-01-25 PROCEDURE — 85730 THROMBOPLASTIN TIME PARTIAL: CPT

## 2025-01-25 PROCEDURE — 71045 X-RAY EXAM CHEST 1 VIEW: CPT

## 2025-01-25 PROCEDURE — 99291 CRITICAL CARE FIRST HOUR: CPT

## 2025-01-25 PROCEDURE — 2500000005 HC RX 250 GENERAL PHARMACY W/O HCPCS

## 2025-01-25 PROCEDURE — 71250 CT THORAX DX C-: CPT

## 2025-01-25 PROCEDURE — P9016 RBC LEUKOCYTES REDUCED: HCPCS

## 2025-01-25 PROCEDURE — 97530 THERAPEUTIC ACTIVITIES: CPT | Mod: GP

## 2025-01-25 PROCEDURE — 74176 CT ABD & PELVIS W/O CONTRAST: CPT | Performed by: RADIOLOGY

## 2025-01-25 PROCEDURE — 82330 ASSAY OF CALCIUM: CPT

## 2025-01-25 PROCEDURE — 2500000004 HC RX 250 GENERAL PHARMACY W/ HCPCS (ALT 636 FOR OP/ED): Mod: JZ | Performed by: NURSE PRACTITIONER

## 2025-01-25 PROCEDURE — 2500000001 HC RX 250 WO HCPCS SELF ADMINISTERED DRUGS (ALT 637 FOR MEDICARE OP): Performed by: NURSE PRACTITIONER

## 2025-01-25 RX ORDER — OXYCODONE HYDROCHLORIDE 5 MG/1
5 TABLET ORAL EVERY 6 HOURS PRN
Status: DISCONTINUED | OUTPATIENT
Start: 2025-01-25 | End: 2025-01-26

## 2025-01-25 RX ORDER — BUDESONIDE 0.5 MG/2ML
0.5 INHALANT ORAL
Status: DISCONTINUED | OUTPATIENT
Start: 2025-01-25 | End: 2025-01-30

## 2025-01-25 RX ORDER — BENZONATATE 100 MG/1
100 CAPSULE ORAL 3 TIMES DAILY PRN
Status: DISCONTINUED | OUTPATIENT
Start: 2025-01-25 | End: 2025-01-26

## 2025-01-25 RX ORDER — AMOXICILLIN 250 MG
1 CAPSULE ORAL DAILY PRN
Status: DISCONTINUED | OUTPATIENT
Start: 2025-01-25 | End: 2025-01-26

## 2025-01-25 RX ORDER — ALBUMIN HUMAN 50 G/1000ML
12.5 SOLUTION INTRAVENOUS ONCE
Status: COMPLETED | OUTPATIENT
Start: 2025-01-25 | End: 2025-01-25

## 2025-01-25 RX ORDER — HYDROMORPHONE HYDROCHLORIDE 0.2 MG/ML
0.2 INJECTION INTRAMUSCULAR; INTRAVENOUS; SUBCUTANEOUS EVERY 6 HOURS
Status: DISCONTINUED | OUTPATIENT
Start: 2025-01-25 | End: 2025-01-25

## 2025-01-25 RX ORDER — POLYETHYLENE GLYCOL 3350 17 G/17G
17 POWDER, FOR SOLUTION ORAL DAILY PRN
Status: DISCONTINUED | OUTPATIENT
Start: 2025-01-25 | End: 2025-01-26

## 2025-01-25 RX ORDER — FUROSEMIDE 10 MG/ML
20 INJECTION INTRAMUSCULAR; INTRAVENOUS ONCE
Status: COMPLETED | OUTPATIENT
Start: 2025-01-25 | End: 2025-01-25

## 2025-01-25 RX ORDER — HYDROMORPHONE HYDROCHLORIDE 0.2 MG/ML
0.2 INJECTION INTRAMUSCULAR; INTRAVENOUS; SUBCUTANEOUS EVERY 6 HOURS PRN
Status: DISCONTINUED | OUTPATIENT
Start: 2025-01-25 | End: 2025-01-26

## 2025-01-25 RX ADMIN — FUROSEMIDE 20 MG: 10 INJECTION, SOLUTION INTRAMUSCULAR; INTRAVENOUS at 09:24

## 2025-01-25 RX ADMIN — MAGNESIUM SULFATE HEPTAHYDRATE 2 G: 40 INJECTION, SOLUTION INTRAVENOUS at 13:35

## 2025-01-25 RX ADMIN — Medication 3 L/MIN: at 15:05

## 2025-01-25 RX ADMIN — IPRATROPIUM BROMIDE AND ALBUTEROL SULFATE 3 ML: .5; 3 SOLUTION RESPIRATORY (INHALATION) at 21:01

## 2025-01-25 RX ADMIN — HYDROMORPHONE HYDROCHLORIDE 0.2 MG: 0.2 INJECTION, SOLUTION INTRAMUSCULAR; INTRAVENOUS; SUBCUTANEOUS at 09:15

## 2025-01-25 RX ADMIN — ALBUMIN HUMAN 12.5 G: 0.05 INJECTION, SOLUTION INTRAVENOUS at 11:31

## 2025-01-25 RX ADMIN — POTASSIUM CHLORIDE 40 MEQ: 1.5 POWDER, FOR SOLUTION ORAL at 15:49

## 2025-01-25 RX ADMIN — ATORVASTATIN CALCIUM 80 MG: 80 TABLET, FILM COATED ORAL at 20:37

## 2025-01-25 RX ADMIN — LAMOTRIGINE 150 MG: 150 TABLET ORAL at 20:37

## 2025-01-25 RX ADMIN — ALBUMIN HUMAN 12.5 G: 0.05 INJECTION, SOLUTION INTRAVENOUS at 12:41

## 2025-01-25 RX ADMIN — BUDESONIDE 0.5 MG: 0.5 INHALANT RESPIRATORY (INHALATION) at 21:02

## 2025-01-25 RX ADMIN — COLCHICINE 0.6 MG: 0.6 TABLET ORAL at 09:25

## 2025-01-25 RX ADMIN — AMIODARONE HYDROCHLORIDE 200 MG: 200 TABLET ORAL at 09:25

## 2025-01-25 RX ADMIN — Medication 5 MG: at 20:37

## 2025-01-25 RX ADMIN — HEPARIN SODIUM 5000 UNITS: 5000 INJECTION, SOLUTION INTRAVENOUS; SUBCUTANEOUS at 09:25

## 2025-01-25 RX ADMIN — ACETAMINOPHEN 975 MG: 325 TABLET ORAL at 09:25

## 2025-01-25 RX ADMIN — IPRATROPIUM BROMIDE AND ALBUTEROL SULFATE 3 ML: .5; 3 SOLUTION RESPIRATORY (INHALATION) at 15:05

## 2025-01-25 RX ADMIN — ONDANSETRON 4 MG: 2 INJECTION INTRAMUSCULAR; INTRAVENOUS at 06:24

## 2025-01-25 RX ADMIN — PIPERACILLIN SODIUM AND TAZOBACTAM SODIUM 4.5 G: 4; .5 INJECTION, SOLUTION INTRAVENOUS at 12:42

## 2025-01-25 RX ADMIN — IPRATROPIUM BROMIDE AND ALBUTEROL SULFATE 3 ML: .5; 3 SOLUTION RESPIRATORY (INHALATION) at 09:20

## 2025-01-25 RX ADMIN — BUDESONIDE 0.5 MG: 0.5 INHALANT RESPIRATORY (INHALATION) at 09:20

## 2025-01-25 RX ADMIN — HYDROMORPHONE HYDROCHLORIDE 0.2 MG: 0.2 INJECTION, SOLUTION INTRAMUSCULAR; INTRAVENOUS; SUBCUTANEOUS at 15:15

## 2025-01-25 RX ADMIN — HEPARIN SODIUM 5000 UNITS: 5000 INJECTION, SOLUTION INTRAVENOUS; SUBCUTANEOUS at 15:49

## 2025-01-25 RX ADMIN — HYDROMORPHONE HYDROCHLORIDE 0.2 MG: 0.2 INJECTION, SOLUTION INTRAMUSCULAR; INTRAVENOUS; SUBCUTANEOUS at 06:24

## 2025-01-25 RX ADMIN — LAMOTRIGINE 200 MG: 150 TABLET ORAL at 15:40

## 2025-01-25 RX ADMIN — ARIPIPRAZOLE 10 MG: 5 TABLET ORAL at 09:28

## 2025-01-25 RX ADMIN — POTASSIUM PHOSPHATE, MONOBASIC AND POTASSIUM PHOSPHATE, DIBASIC 21 MMOL: 224; 236 INJECTION, SOLUTION, CONCENTRATE INTRAVENOUS at 15:40

## 2025-01-25 RX ADMIN — Medication 3 L/MIN: at 09:20

## 2025-01-25 RX ADMIN — PIPERACILLIN SODIUM AND TAZOBACTAM SODIUM 4.5 G: 4; .5 INJECTION, SOLUTION INTRAVENOUS at 18:42

## 2025-01-25 RX ADMIN — MIRTAZAPINE 15 MG: 15 TABLET, FILM COATED ORAL at 20:37

## 2025-01-25 RX ADMIN — ACETAMINOPHEN 975 MG: 325 TABLET ORAL at 15:40

## 2025-01-25 RX ADMIN — OXYCODONE 5 MG: 5 TABLET ORAL at 20:39

## 2025-01-25 RX ADMIN — ACETAMINOPHEN 975 MG: 325 TABLET ORAL at 20:40

## 2025-01-25 RX ADMIN — IPRATROPIUM BROMIDE AND ALBUTEROL SULFATE 3 ML: .5; 3 SOLUTION RESPIRATORY (INHALATION) at 03:30

## 2025-01-25 RX ADMIN — BENZONATATE 100 MG: 100 CAPSULE ORAL at 06:24

## 2025-01-25 RX ADMIN — POTASSIUM CHLORIDE 20 MEQ: 14.9 INJECTION, SOLUTION INTRAVENOUS at 06:32

## 2025-01-25 RX ADMIN — ASPIRIN 81 MG CHEWABLE TABLET 81 MG: 81 TABLET CHEWABLE at 09:25

## 2025-01-25 ASSESSMENT — COGNITIVE AND FUNCTIONAL STATUS - GENERAL
CLIMB 3 TO 5 STEPS WITH RAILING: TOTAL
MOVING TO AND FROM BED TO CHAIR: A LOT
TURNING FROM BACK TO SIDE WHILE IN FLAT BAD: A LOT
STANDING UP FROM CHAIR USING ARMS: A LOT
MOBILITY SCORE: 11
WALKING IN HOSPITAL ROOM: TOTAL
MOVING FROM LYING ON BACK TO SITTING ON SIDE OF FLAT BED WITH BEDRAILS: A LITTLE

## 2025-01-25 ASSESSMENT — PAIN SCALES - GENERAL
PAINLEVEL_OUTOF10: 1
PAINLEVEL_OUTOF10: 0 - NO PAIN
PAINLEVEL_OUTOF10: 6
PAINLEVEL_OUTOF10: 0 - NO PAIN
PAINLEVEL_OUTOF10: 8
PAINLEVEL_OUTOF10: 5 - MODERATE PAIN
PAINLEVEL_OUTOF10: 4

## 2025-01-25 ASSESSMENT — PAIN - FUNCTIONAL ASSESSMENT
PAIN_FUNCTIONAL_ASSESSMENT: 0-10

## 2025-01-25 NOTE — PROGRESS NOTES
"Physical Therapy    Physical Therapy Treatment    Patient Name: Parish Valerio \"Bret\"  MRN: [id number]  Department: [facility] [place]  Room: 16/16-A  Today's Date: 1/25/2025  Time Calculation  Start Time: 1344  Stop Time: 1407  Time Calculation (min): 23 min         Assessment/Plan   PT Assessment  PT Assessment Results: Decreased strength, Impaired balance, Decreased endurance, Decreased mobility  Rehab Prognosis: Good  Barriers to Discharge Home: Physical needs  Physical Needs: High falls risk due to function or environment  Evaluation/Treatment Tolerance: Patient tolerated treatment well  Medical Staff Made Aware: Yes  End of Session Communication: Bedside nurse  Assessment Comment: Pt performed bed mobility and functional transfers with Min A x 2 this date; pt limited due to unstable BP in standing however improved BP in seated. Pt will continue to benefit from skilled PT to improve functional mobility.  End of Session Patient Position: Up in chair, Alarm off, not on at start of session  PT Plan  Inpatient/Swing Bed or Outpatient: Inpatient  PT Plan  Treatment/Interventions: Bed mobility, Transfer training, Gait training, Stair training, Balance training, Endurance training, Strengthening, Therapeutic activity, Therapeutic exercise, Home exercise program, Positioning  PT Plan: Ongoing PT  PT Frequency: 5 times per week  PT Discharge Recommendations: Low intensity level of continued care  Equipment Recommended upon Discharge: Wheeled walker  PT Recommended Transfer Status: Assist x2  PT - OK to Discharge: Yes      General Visit Information:   PT  Visit  PT Received On: 01/25/25  Response to Previous Treatment: Patient with no complaints from previous session.  General  Prior to Session Communication: Bedside nurse  Patient Position Received: Bed, 3 rail up, Alarm off, not on at start of session  Preferred Learning Style: auditory, verbal, visual  General Comment: Pt willing to participate in PT treatment session; per RN, " pt awaiting RBC to arrive due to low Hgb (7.5) and soft BP this date; RN in room during session for safety and monitoring of vitals. (Lines: A line, tele, O2 NC, IV)    Subjective   Precautions:  Precautions  Hearing/Visual Limitations: WFL  Medical Precautions: Cardiac precautions, Fall precautions  Precautions Comment: MAP 70-90, SpO2 >92%     Date/Time Vitals Session Patient Position Pulse Resp SpO2 BP MAP (mmHg)    01/25/25 1300 --  --  75  17  99 %  --  --     01/25/25 1344 Pre PT  Lying  77  --  94 %  105/44  64     01/25/25 1400 --  --  80  20  94 %  --  --     01/25/25 1407 Post PT  Sitting  75  --  99 %  115/58  76     01/25/25 1414 --  --  74  22  99 %  108/55  --           Vital Signs Comment: Drop in BP to MAP of 50s during 2 stands this date; however, BP improved in sitting as compared to supine     Objective   Pain:  Pain Assessment  Pain Assessment: 0-10  0-10 (Numeric) Pain Score: 0 - No pain  Cognition:  Cognition  Overall Cognitive Status: Within Functional Limits  Orientation Level: Oriented X4 (stated it was the 26th, oriented to month and year)  Coordination:  Movements are Fluid and Coordinated: Yes  Postural Control:  Postural Control  Postural Control: Within Functional Limits  Extremity/Trunk Assessments:    RLE   RLE : Within Functional Limits  LLE   LLE : Within Functional Limits  Activity Tolerance:  Activity Tolerance  Endurance: Tolerates 10 - 20 min exercise with multiple rests, Decreased tolerance for upright activites  Early Mobility/Exercise Safety Screen: Proceed with mobilization - No exclusion criteria met  Activity Tolerance Comments: Unstable BP in standing however improved in seated  Treatments:  Therapeutic Exercise  Therapeutic Exercise Performed: Yes  Therapeutic Exercise Activity 1: Pt performed 5 LAQ in seated at EOB    Therapeutic Activity  Therapeutic Activity Performed: Yes  Therapeutic Activity 1: Increased time for advanced ICU line management required for functional  mobility  Therapeutic Activity 2: Pt sat EOB for 5 minutes with SBA-CGA for safety; one bout of Min A due to L lateral instability in seated; BP improved upon sitting EOB with MAPs in upper 60s-70s as compared to MAP of low 60s in supine  Therapeutic Activity 3: Pt stood for ~15s with Min A x 2; MAP dropped to mid 50s and pt reported slight dizziness. BP improved upon sitting    Bed Mobility  Bed Mobility: Yes  Bed Mobility 1  Bed Mobility 1: Supine to sitting  Level of Assistance 1: Minimum assistance, +2  Bed Mobility Comments 1: Min A x 2 for LE management and trunk elevation to seated    Ambulation/Gait Training  Ambulation/Gait Training Performed:  (Ambulation aborted this date due to decreased Hgb and unstable BP in standing)  Transfers  Transfer: Yes  Transfer 1  Transfer From 1: Sit to, Stand to  Transfer to 1: Stand, Sit  Technique 1: Sit to stand, Stand to sit  Transfer Level of Assistance 1: Arm in arm assistance, Minimum assistance, +2  Trials/Comments 1: Min A x 2 for hip elevation to stand; pt returned to sitting due drop in MAP to 50s in standing  Transfers 2  Transfer From 2: Bed to  Transfer to 2: Chair with arms  Technique 2: Stand pivot  Transfer Level of Assistance 2: Arm in arm assistance, Minimum assistance, +2  Trials/Comments 2: Min A x 2 for hip elevation to stand + balance in standing while taking 3 side steps to chair; MAP dropped to low 50s however recovered upon sitting    Outcome Measures:  [facility] Basic Mobility  Turning from your back to your side while in a flat bed without using bedrails: A little  Moving from lying on your back to sitting on the side of a flat bed without using bedrails: A lot  Moving to and from bed to chair (including a wheelchair): A lot  Standing up from a chair using your arms (e.g. wheelchair or bedside chair): A lot  To walk in hospital room: Total  Climbing 3-5 steps with railing: Total  Basic Mobility - Total Score: 11    FSS-ICU  Ambulation: Walks <50 feet  with any assistance x1 or walks any distance with assistance x2 people  Rolling: Minimal assistance (performs 75% or more of task)  Sitting: Minimal assistance (performs 75% or more of task)  Transfer Sit-to-Stand: Moderate assistance (performs 50 - 74% of task)  Transfer Supine-to-Sit: Moderate assistance (performs 50 - 74% of task)  Total Score: 15      Early Mobility/Exercise Safety Screen: Proceed with mobilization - No exclusion criteria met  ICU Mobility Scale: Transferring bed to chair [5]    Education Documentation  Precautions, taught by Roselyn Gonzalez PT at 1/25/2025  2:36 PM.  Learner: Patient  Readiness: Acceptance  Method: Explanation  Response: Verbalizes Understanding    Body Mechanics, taught by Roselyn Gonzalez PT at 1/25/2025  2:36 PM.  Learner: Patient  Readiness: Acceptance  Method: Explanation  Response: Verbalizes Understanding    Mobility Training, taught by Roselyn Gonzalez PT at 1/25/2025  2:36 PM.  Learner: Patient  Readiness: Acceptance  Method: Explanation  Response: Verbalizes Understanding    Education Comments  No comments found.          Encounter Problems       Encounter Problems (Active)       Balance       Pt will demonstrate ability to complete standing static/dynamic balance activities with unilateral UE support and no LOB for increase in safety up D/C.  (Progressing)       Start:  01/22/25    Expected End:  02/05/25               Mobility       Pt will demonstrated ability to ambulate >/=150ft with proper form and no balance deficits for safe home going.   (Progressing)       Start:  01/22/25    Expected End:  02/05/25            Pt will demonstrate ability to ascend/descend 2 stairs with unilateral rail and no balance deficits for safe home going.  (Progressing)       Start:  01/22/25    Expected End:  02/05/25               PT Transfers       Pt will demonstrated ability to complete bed mobility and sit<>stand transfers without assistance and use of assistive device to safely  return home.  (Progressing)       Start:  01/22/25    Expected End:  02/05/25               Pain - Adult            DENTON BLEDSOE, PT
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
## 2025-01-26 ENCOUNTER — APPOINTMENT (OUTPATIENT)
Dept: RADIOLOGY | Facility: HOSPITAL | Age: 75
DRG: 235 | End: 2025-01-26
Payer: MEDICARE

## 2025-01-26 VITALS
OXYGEN SATURATION: 98 % | DIASTOLIC BLOOD PRESSURE: 89 MMHG | RESPIRATION RATE: 18 BRPM | HEIGHT: 69 IN | TEMPERATURE: 97.5 F | SYSTOLIC BLOOD PRESSURE: 109 MMHG | WEIGHT: 136.02 LBS | HEART RATE: 63 BPM | BODY MASS INDEX: 20.15 KG/M2

## 2025-01-26 LAB
ALBUMIN SERPL BCP-MCNC: 3.8 G/DL (ref 3.4–5)
ANION GAP BLDA CALCULATED.4IONS-SCNC: 6 MMO/L (ref 10–25)
ANION GAP SERPL CALC-SCNC: 12 MMOL/L (ref 10–20)
BASE EXCESS BLDA CALC-SCNC: 4.7 MMOL/L (ref -2–3)
BODY TEMPERATURE: 37 DEGREES CELSIUS
BUN SERPL-MCNC: 21 MG/DL (ref 6–23)
CA-I BLD-SCNC: 1.12 MMOL/L (ref 1.1–1.33)
CA-I BLDA-SCNC: 1.19 MMOL/L (ref 1.1–1.33)
CALCIUM SERPL-MCNC: 8.5 MG/DL (ref 8.6–10.6)
CHLORIDE BLDA-SCNC: 106 MMOL/L (ref 98–107)
CHLORIDE SERPL-SCNC: 104 MMOL/L (ref 98–107)
CO2 SERPL-SCNC: 28 MMOL/L (ref 21–32)
CREAT SERPL-MCNC: 0.68 MG/DL (ref 0.5–1.3)
EGFRCR SERPLBLD CKD-EPI 2021: >90 ML/MIN/1.73M*2
ERYTHROCYTE [DISTWIDTH] IN BLOOD BY AUTOMATED COUNT: 14.5 % (ref 11.5–14.5)
GLUCOSE BLD MANUAL STRIP-MCNC: 85 MG/DL (ref 74–99)
GLUCOSE BLDA-MCNC: 87 MG/DL (ref 74–99)
GLUCOSE SERPL-MCNC: 85 MG/DL (ref 74–99)
HCO3 BLDA-SCNC: 29.2 MMOL/L (ref 22–26)
HCT VFR BLD AUTO: 24.8 % (ref 41–52)
HCT VFR BLD EST: 28 % (ref 41–52)
HGB BLD-MCNC: 8.6 G/DL (ref 13.5–17.5)
HGB BLDA-MCNC: 9.2 G/DL (ref 13.5–17.5)
INHALED O2 CONCENTRATION: 26 %
LACTATE BLDA-SCNC: 0.7 MMOL/L (ref 0.4–2)
MAGNESIUM SERPL-MCNC: 2.24 MG/DL (ref 1.6–2.4)
MCH RBC QN AUTO: 31.7 PG (ref 26–34)
MCHC RBC AUTO-ENTMCNC: 34.7 G/DL (ref 32–36)
MCV RBC AUTO: 92 FL (ref 80–100)
NRBC BLD-RTO: 0 /100 WBCS (ref 0–0)
OXYHGB MFR BLDA: 95.1 % (ref 94–98)
PCO2 BLDA: 42 MM HG (ref 38–42)
PH BLDA: 7.45 PH (ref 7.38–7.42)
PHOSPHATE SERPL-MCNC: 2.9 MG/DL (ref 2.5–4.9)
PLATELET # BLD AUTO: 114 X10*3/UL (ref 150–450)
PO2 BLDA: 78 MM HG (ref 85–95)
POTASSIUM BLDA-SCNC: 3.9 MMOL/L (ref 3.5–5.3)
POTASSIUM SERPL-SCNC: 4 MMOL/L (ref 3.5–5.3)
RBC # BLD AUTO: 2.71 X10*6/UL (ref 4.5–5.9)
SAO2 % BLDA: 97 % (ref 94–100)
SODIUM BLDA-SCNC: 137 MMOL/L (ref 136–145)
SODIUM SERPL-SCNC: 140 MMOL/L (ref 136–145)
WBC # BLD AUTO: 5 X10*3/UL (ref 4.4–11.3)

## 2025-01-26 PROCEDURE — 82947 ASSAY GLUCOSE BLOOD QUANT: CPT

## 2025-01-26 PROCEDURE — 2500000004 HC RX 250 GENERAL PHARMACY W/ HCPCS (ALT 636 FOR OP/ED): Performed by: NURSE PRACTITIONER

## 2025-01-26 PROCEDURE — 94669 MECHANICAL CHEST WALL OSCILL: CPT

## 2025-01-26 PROCEDURE — 2060000001 HC INTERMEDIATE ICU ROOM DAILY

## 2025-01-26 PROCEDURE — 2500000001 HC RX 250 WO HCPCS SELF ADMINISTERED DRUGS (ALT 637 FOR MEDICARE OP): Performed by: NURSE PRACTITIONER

## 2025-01-26 PROCEDURE — 85027 COMPLETE CBC AUTOMATED: CPT

## 2025-01-26 PROCEDURE — 71045 X-RAY EXAM CHEST 1 VIEW: CPT

## 2025-01-26 PROCEDURE — 82330 ASSAY OF CALCIUM: CPT

## 2025-01-26 PROCEDURE — 83735 ASSAY OF MAGNESIUM: CPT

## 2025-01-26 PROCEDURE — 2500000001 HC RX 250 WO HCPCS SELF ADMINISTERED DRUGS (ALT 637 FOR MEDICARE OP)

## 2025-01-26 PROCEDURE — 2500000004 HC RX 250 GENERAL PHARMACY W/ HCPCS (ALT 636 FOR OP/ED)

## 2025-01-26 PROCEDURE — 71045 X-RAY EXAM CHEST 1 VIEW: CPT | Performed by: RADIOLOGY

## 2025-01-26 PROCEDURE — 2500000002 HC RX 250 W HCPCS SELF ADMINISTERED DRUGS (ALT 637 FOR MEDICARE OP, ALT 636 FOR OP/ED)

## 2025-01-26 PROCEDURE — 94640 AIRWAY INHALATION TREATMENT: CPT

## 2025-01-26 PROCEDURE — 99291 CRITICAL CARE FIRST HOUR: CPT

## 2025-01-26 PROCEDURE — 2500000002 HC RX 250 W HCPCS SELF ADMINISTERED DRUGS (ALT 637 FOR MEDICARE OP, ALT 636 FOR OP/ED): Performed by: NURSE PRACTITIONER

## 2025-01-26 PROCEDURE — 82435 ASSAY OF BLOOD CHLORIDE: CPT

## 2025-01-26 PROCEDURE — 2500000005 HC RX 250 GENERAL PHARMACY W/O HCPCS: Performed by: STUDENT IN AN ORGANIZED HEALTH CARE EDUCATION/TRAINING PROGRAM

## 2025-01-26 PROCEDURE — 37799 UNLISTED PX VASCULAR SURGERY: CPT

## 2025-01-26 PROCEDURE — 94668 MNPJ CHEST WALL SBSQ: CPT

## 2025-01-26 RX ORDER — IPRATROPIUM BROMIDE AND ALBUTEROL SULFATE 2.5; .5 MG/3ML; MG/3ML
3 SOLUTION RESPIRATORY (INHALATION)
Status: DISCONTINUED | OUTPATIENT
Start: 2025-01-27 | End: 2025-02-04 | Stop reason: HOSPADM

## 2025-01-26 RX ORDER — METOPROLOL TARTRATE 25 MG/1
12.5 TABLET, FILM COATED ORAL EVERY 12 HOURS
Status: DISCONTINUED | OUTPATIENT
Start: 2025-01-26 | End: 2025-01-27

## 2025-01-26 RX ORDER — OXYCODONE HYDROCHLORIDE 5 MG/1
5 TABLET ORAL EVERY 6 HOURS PRN
Status: DISCONTINUED | OUTPATIENT
Start: 2025-01-26 | End: 2025-02-04 | Stop reason: HOSPADM

## 2025-01-26 RX ADMIN — ACETAMINOPHEN 975 MG: 325 TABLET ORAL at 16:42

## 2025-01-26 RX ADMIN — PIPERACILLIN SODIUM AND TAZOBACTAM SODIUM 4.5 G: 4; .5 INJECTION, SOLUTION INTRAVENOUS at 06:00

## 2025-01-26 RX ADMIN — METOPROLOL TARTRATE 12.5 MG: 25 TABLET, FILM COATED ORAL at 11:13

## 2025-01-26 RX ADMIN — HEPARIN SODIUM 5000 UNITS: 5000 INJECTION, SOLUTION INTRAVENOUS; SUBCUTANEOUS at 16:43

## 2025-01-26 RX ADMIN — MIRTAZAPINE 15 MG: 15 TABLET, FILM COATED ORAL at 21:52

## 2025-01-26 RX ADMIN — ACETAMINOPHEN 975 MG: 325 TABLET ORAL at 11:12

## 2025-01-26 RX ADMIN — ASPIRIN 81 MG CHEWABLE TABLET 81 MG: 81 TABLET CHEWABLE at 08:48

## 2025-01-26 RX ADMIN — IPRATROPIUM BROMIDE AND ALBUTEROL SULFATE 3 ML: .5; 3 SOLUTION RESPIRATORY (INHALATION) at 03:19

## 2025-01-26 RX ADMIN — IPRATROPIUM BROMIDE AND ALBUTEROL SULFATE 3 ML: .5; 3 SOLUTION RESPIRATORY (INHALATION) at 20:39

## 2025-01-26 RX ADMIN — IPRATROPIUM BROMIDE AND ALBUTEROL SULFATE 3 ML: .5; 3 SOLUTION RESPIRATORY (INHALATION) at 08:30

## 2025-01-26 RX ADMIN — METOPROLOL TARTRATE 12.5 MG: 25 TABLET, FILM COATED ORAL at 21:50

## 2025-01-26 RX ADMIN — COLCHICINE 0.6 MG: 0.6 TABLET ORAL at 08:48

## 2025-01-26 RX ADMIN — ATORVASTATIN CALCIUM 80 MG: 80 TABLET, FILM COATED ORAL at 21:50

## 2025-01-26 RX ADMIN — Medication 5 MG: at 21:51

## 2025-01-26 RX ADMIN — PANTOPRAZOLE SODIUM 40 MG: 40 TABLET, DELAYED RELEASE ORAL at 06:00

## 2025-01-26 RX ADMIN — ACETAMINOPHEN 975 MG: 325 TABLET ORAL at 21:50

## 2025-01-26 RX ADMIN — PIPERACILLIN SODIUM AND TAZOBACTAM SODIUM 4.5 G: 4; .5 INJECTION, SOLUTION INTRAVENOUS at 00:32

## 2025-01-26 RX ADMIN — PIPERACILLIN SODIUM AND TAZOBACTAM SODIUM 4.5 G: 4; .5 INJECTION, SOLUTION INTRAVENOUS at 18:00

## 2025-01-26 RX ADMIN — LAMOTRIGINE 200 MG: 150 TABLET ORAL at 17:42

## 2025-01-26 RX ADMIN — PIPERACILLIN SODIUM AND TAZOBACTAM SODIUM 4.5 G: 4; .5 INJECTION, SOLUTION INTRAVENOUS at 11:12

## 2025-01-26 RX ADMIN — LAMOTRIGINE 150 MG: 150 TABLET ORAL at 22:40

## 2025-01-26 RX ADMIN — AMIODARONE HYDROCHLORIDE 200 MG: 200 TABLET ORAL at 08:48

## 2025-01-26 RX ADMIN — BUDESONIDE 0.5 MG: 0.5 INHALANT RESPIRATORY (INHALATION) at 08:30

## 2025-01-26 RX ADMIN — IPRATROPIUM BROMIDE AND ALBUTEROL SULFATE 3 ML: .5; 3 SOLUTION RESPIRATORY (INHALATION) at 15:13

## 2025-01-26 RX ADMIN — HEPARIN SODIUM 5000 UNITS: 5000 INJECTION, SOLUTION INTRAVENOUS; SUBCUTANEOUS at 08:47

## 2025-01-26 RX ADMIN — Medication 2 L/MIN: at 08:30

## 2025-01-26 ASSESSMENT — PAIN SCALES - GENERAL
PAINLEVEL_OUTOF10: 0 - NO PAIN

## 2025-01-26 ASSESSMENT — PAIN - FUNCTIONAL ASSESSMENT
PAIN_FUNCTIONAL_ASSESSMENT: 0-10

## 2025-01-26 NOTE — CARE PLAN
Problem: Chronic Conditions and Co-morbidities  Goal: Patient's chronic conditions and co-morbidity symptoms are monitored and maintained or improved  Outcome: Progressing     Problem: Skin  Goal: Prevent/minimize sheer/friction injuries  Outcome: Progressing  Flowsheets (Taken 1/26/2025 0637)  Prevent/minimize sheer/friction injuries:   Turn/reposition every 2 hours/use positioning/transfer devices   Use pull sheet  Goal: Promote skin healing  Outcome: Progressing  Flowsheets (Taken 1/26/2025 0637)  Promote skin healing: Turn/reposition every 2 hours/use positioning/transfer devices
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
## 2025-01-27 ENCOUNTER — APPOINTMENT (OUTPATIENT)
Dept: RADIOLOGY | Facility: HOSPITAL | Age: 75
DRG: 235 | End: 2025-01-27
Payer: MEDICARE

## 2025-01-27 LAB
ALBUMIN SERPL BCP-MCNC: 3.8 G/DL (ref 3.4–5)
ANION GAP SERPL CALC-SCNC: 13 MMOL/L (ref 10–20)
ATRIAL RATE: 60 BPM
ATRIAL RATE: 74 BPM
BUN SERPL-MCNC: 24 MG/DL (ref 6–23)
C DIF TOX TCDA+TCDB STL QL NAA+PROBE: NOT DETECTED
CALCIUM SERPL-MCNC: 8.6 MG/DL (ref 8.6–10.6)
CHLORIDE SERPL-SCNC: 104 MMOL/L (ref 98–107)
CO2 SERPL-SCNC: 26 MMOL/L (ref 21–32)
CREAT SERPL-MCNC: 0.49 MG/DL (ref 0.5–1.3)
EGFRCR SERPLBLD CKD-EPI 2021: >90 ML/MIN/1.73M*2
ERYTHROCYTE [DISTWIDTH] IN BLOOD BY AUTOMATED COUNT: 14.4 % (ref 11.5–14.5)
GLUCOSE BLD MANUAL STRIP-MCNC: 88 MG/DL (ref 74–99)
GLUCOSE BLD MANUAL STRIP-MCNC: 92 MG/DL (ref 74–99)
GLUCOSE BLD MANUAL STRIP-MCNC: 93 MG/DL (ref 74–99)
GLUCOSE BLD MANUAL STRIP-MCNC: 94 MG/DL (ref 74–99)
GLUCOSE SERPL-MCNC: 73 MG/DL (ref 74–99)
HCT VFR BLD AUTO: 29.6 % (ref 41–52)
HGB BLD-MCNC: 10 G/DL (ref 13.5–17.5)
MAGNESIUM SERPL-MCNC: 2.07 MG/DL (ref 1.6–2.4)
MCH RBC QN AUTO: 31.9 PG (ref 26–34)
MCHC RBC AUTO-ENTMCNC: 33.8 G/DL (ref 32–36)
MCV RBC AUTO: 95 FL (ref 80–100)
NRBC BLD-RTO: 0 /100 WBCS (ref 0–0)
P AXIS: 57 DEGREES
P AXIS: 80 DEGREES
P OFFSET: 147 MS
P OFFSET: 185 MS
P ONSET: 116 MS
P ONSET: 119 MS
PHOSPHATE SERPL-MCNC: 2.4 MG/DL (ref 2.5–4.9)
PLATELET # BLD AUTO: 142 X10*3/UL (ref 150–450)
POTASSIUM SERPL-SCNC: 3.7 MMOL/L (ref 3.5–5.3)
PR INTERVAL: 196 MS
PR INTERVAL: 200 MS
Q ONSET: 216 MS
Q ONSET: 217 MS
QRS COUNT: 10 BEATS
QRS COUNT: 12 BEATS
QRS DURATION: 104 MS
QRS DURATION: 110 MS
QT INTERVAL: 434 MS
QT INTERVAL: 532 MS
QTC CALCULATION(BAZETT): 481 MS
QTC CALCULATION(BAZETT): 532 MS
QTC FREDERICIA: 465 MS
QTC FREDERICIA: 532 MS
R AXIS: -4 DEGREES
R AXIS: 23 DEGREES
RBC # BLD AUTO: 3.13 X10*6/UL (ref 4.5–5.9)
SODIUM SERPL-SCNC: 139 MMOL/L (ref 136–145)
T AXIS: 84 DEGREES
T AXIS: 87 DEGREES
T OFFSET: 433 MS
T OFFSET: 483 MS
VENTRICULAR RATE: 60 BPM
VENTRICULAR RATE: 74 BPM
WBC # BLD AUTO: 6.8 X10*3/UL (ref 4.4–11.3)

## 2025-01-27 PROCEDURE — 36415 COLL VENOUS BLD VENIPUNCTURE: CPT | Performed by: NURSE PRACTITIONER

## 2025-01-27 PROCEDURE — 2500000002 HC RX 250 W HCPCS SELF ADMINISTERED DRUGS (ALT 637 FOR MEDICARE OP, ALT 636 FOR OP/ED): Performed by: NURSE PRACTITIONER

## 2025-01-27 PROCEDURE — 92526 ORAL FUNCTION THERAPY: CPT | Mod: GN | Performed by: SPEECH-LANGUAGE PATHOLOGIST

## 2025-01-27 PROCEDURE — 2500000001 HC RX 250 WO HCPCS SELF ADMINISTERED DRUGS (ALT 637 FOR MEDICARE OP): Performed by: NURSE PRACTITIONER

## 2025-01-27 PROCEDURE — 94640 AIRWAY INHALATION TREATMENT: CPT

## 2025-01-27 PROCEDURE — 2500000004 HC RX 250 GENERAL PHARMACY W/ HCPCS (ALT 636 FOR OP/ED)

## 2025-01-27 PROCEDURE — 71045 X-RAY EXAM CHEST 1 VIEW: CPT | Performed by: RADIOLOGY

## 2025-01-27 PROCEDURE — 2500000001 HC RX 250 WO HCPCS SELF ADMINISTERED DRUGS (ALT 637 FOR MEDICARE OP)

## 2025-01-27 PROCEDURE — 85027 COMPLETE CBC AUTOMATED: CPT | Performed by: NURSE PRACTITIONER

## 2025-01-27 PROCEDURE — 94668 MNPJ CHEST WALL SBSQ: CPT

## 2025-01-27 PROCEDURE — 2500000005 HC RX 250 GENERAL PHARMACY W/O HCPCS: Performed by: NURSE PRACTITIONER

## 2025-01-27 PROCEDURE — 71045 X-RAY EXAM CHEST 1 VIEW: CPT

## 2025-01-27 PROCEDURE — 82947 ASSAY GLUCOSE BLOOD QUANT: CPT

## 2025-01-27 PROCEDURE — 2060000001 HC INTERMEDIATE ICU ROOM DAILY

## 2025-01-27 PROCEDURE — 83735 ASSAY OF MAGNESIUM: CPT | Performed by: NURSE PRACTITIONER

## 2025-01-27 PROCEDURE — 2500000004 HC RX 250 GENERAL PHARMACY W/ HCPCS (ALT 636 FOR OP/ED): Performed by: NURSE PRACTITIONER

## 2025-01-27 PROCEDURE — 99232 SBSQ HOSP IP/OBS MODERATE 35: CPT

## 2025-01-27 PROCEDURE — 2500000002 HC RX 250 W HCPCS SELF ADMINISTERED DRUGS (ALT 637 FOR MEDICARE OP, ALT 636 FOR OP/ED): Performed by: STUDENT IN AN ORGANIZED HEALTH CARE EDUCATION/TRAINING PROGRAM

## 2025-01-27 PROCEDURE — 92612 ENDOSCOPY SWALLOW (FEES) VID: CPT | Mod: GN | Performed by: SPEECH-LANGUAGE PATHOLOGIST

## 2025-01-27 PROCEDURE — 87493 C DIFF AMPLIFIED PROBE: CPT

## 2025-01-27 PROCEDURE — 80069 RENAL FUNCTION PANEL: CPT | Performed by: NURSE PRACTITIONER

## 2025-01-27 RX ORDER — CHLORHEXIDINE GLUCONATE ORAL RINSE 1.2 MG/ML
15 SOLUTION DENTAL ONCE
Qty: 15 ML | Refills: 0 | Status: SHIPPED | OUTPATIENT
Start: 2025-01-27 | End: 2025-01-27

## 2025-01-27 RX ORDER — CHLORHEXIDINE GLUCONATE 40 MG/ML
SOLUTION TOPICAL DAILY
Qty: 480 ML | Refills: 0 | Status: SHIPPED | OUTPATIENT
Start: 2025-01-27

## 2025-01-27 RX ORDER — FUROSEMIDE 10 MG/ML
20 INJECTION INTRAMUSCULAR; INTRAVENOUS ONCE
Status: COMPLETED | OUTPATIENT
Start: 2025-01-27 | End: 2025-01-27

## 2025-01-27 RX ORDER — METOPROLOL TARTRATE 25 MG/1
25 TABLET, FILM COATED ORAL 2 TIMES DAILY
Status: DISCONTINUED | OUTPATIENT
Start: 2025-01-27 | End: 2025-02-01

## 2025-01-27 RX ADMIN — ACETAMINOPHEN 975 MG: 325 TABLET ORAL at 18:47

## 2025-01-27 RX ADMIN — METOPROLOL TARTRATE 12.5 MG: 25 TABLET, FILM COATED ORAL at 08:57

## 2025-01-27 RX ADMIN — BUDESONIDE 0.5 MG: 0.5 INHALANT RESPIRATORY (INHALATION) at 08:10

## 2025-01-27 RX ADMIN — MIRTAZAPINE 15 MG: 15 TABLET, FILM COATED ORAL at 22:19

## 2025-01-27 RX ADMIN — LAMOTRIGINE 200 MG: 150 TABLET ORAL at 18:47

## 2025-01-27 RX ADMIN — PIPERACILLIN SODIUM AND TAZOBACTAM SODIUM 4.5 G: 4; .5 INJECTION, SOLUTION INTRAVENOUS at 00:33

## 2025-01-27 RX ADMIN — ATORVASTATIN CALCIUM 80 MG: 80 TABLET, FILM COATED ORAL at 22:19

## 2025-01-27 RX ADMIN — HEPARIN SODIUM 5000 UNITS: 5000 INJECTION, SOLUTION INTRAVENOUS; SUBCUTANEOUS at 00:31

## 2025-01-27 RX ADMIN — LAMOTRIGINE 150 MG: 150 TABLET ORAL at 22:19

## 2025-01-27 RX ADMIN — PIPERACILLIN SODIUM AND TAZOBACTAM SODIUM 4.5 G: 4; .5 INJECTION, SOLUTION INTRAVENOUS at 06:19

## 2025-01-27 RX ADMIN — ASPIRIN 81 MG CHEWABLE TABLET 81 MG: 81 TABLET CHEWABLE at 08:56

## 2025-01-27 RX ADMIN — COLCHICINE 0.6 MG: 0.6 TABLET ORAL at 08:56

## 2025-01-27 RX ADMIN — ARIPIPRAZOLE 10 MG: 5 TABLET ORAL at 08:56

## 2025-01-27 RX ADMIN — Medication 3 L/MIN: at 08:18

## 2025-01-27 RX ADMIN — Medication 5 MG: at 22:19

## 2025-01-27 RX ADMIN — PIPERACILLIN SODIUM AND TAZOBACTAM SODIUM 4.5 G: 4; .5 INJECTION, SOLUTION INTRAVENOUS at 18:48

## 2025-01-27 RX ADMIN — HEPARIN SODIUM 5000 UNITS: 5000 INJECTION, SOLUTION INTRAVENOUS; SUBCUTANEOUS at 18:47

## 2025-01-27 RX ADMIN — IPRATROPIUM BROMIDE AND ALBUTEROL SULFATE 3 ML: .5; 3 SOLUTION RESPIRATORY (INHALATION) at 08:10

## 2025-01-27 RX ADMIN — HEPARIN SODIUM 5000 UNITS: 5000 INJECTION, SOLUTION INTRAVENOUS; SUBCUTANEOUS at 08:57

## 2025-01-27 RX ADMIN — PIPERACILLIN SODIUM AND TAZOBACTAM SODIUM 4.5 G: 4; .5 INJECTION, SOLUTION INTRAVENOUS at 12:37

## 2025-01-27 RX ADMIN — AMIODARONE HYDROCHLORIDE 200 MG: 200 TABLET ORAL at 08:56

## 2025-01-27 RX ADMIN — ACETAMINOPHEN 975 MG: 325 TABLET ORAL at 22:20

## 2025-01-27 RX ADMIN — FUROSEMIDE 20 MG: 10 INJECTION, SOLUTION INTRAVENOUS at 18:47

## 2025-01-27 RX ADMIN — METOPROLOL TARTRATE 25 MG: 25 TABLET, FILM COATED ORAL at 22:19

## 2025-01-27 RX ADMIN — PANTOPRAZOLE SODIUM 40 MG: 40 TABLET, DELAYED RELEASE ORAL at 06:19

## 2025-01-27 RX ADMIN — IPRATROPIUM BROMIDE AND ALBUTEROL SULFATE 3 ML: .5; 3 SOLUTION RESPIRATORY (INHALATION) at 20:41

## 2025-01-27 RX ADMIN — IPRATROPIUM BROMIDE AND ALBUTEROL SULFATE 3 ML: .5; 3 SOLUTION RESPIRATORY (INHALATION) at 14:02

## 2025-01-27 RX ADMIN — FUROSEMIDE 20 MG: 10 INJECTION, SOLUTION INTRAVENOUS at 08:56

## 2025-01-27 ASSESSMENT — PAIN SCALES - GENERAL: PAINLEVEL_OUTOF10: 0 - NO PAIN

## 2025-01-27 ASSESSMENT — PAIN - FUNCTIONAL ASSESSMENT: PAIN_FUNCTIONAL_ASSESSMENT: 0-10
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
## 2025-01-27 NOTE — PROGRESS NOTES
01/27/25 1251   Discharge Planning   Expected Discharge Disposition Home H  ([facility])     Met with patient and introduced myself as Care Coordinator and member of the discharge planning team.  Pt is s/p Midcab x2. He plans to return home at time of discharge with [facility]. Care Coordinator will continue to follow for home going needs.

## 2025-01-27 NOTE — PROGRESS NOTES
Adult Speech-Language Pathology  Fiberoptic Endoscopic Evaluation of the Swallow       Fiberoptic Endoscopic Evaluation of Swallowing (FEES)    Patient: Parish Valerio 1950 74 y.o.  MRN: [id number]  Today's Date: 01/27/25  Time Calculation  Start Time: 1029  Stop Time: 1100  Time Calculation (min): 31 min              ASSESSMENT/PLAN:     Diagnosis: Mild-Moderate oropharyngeal dysphagia  - Fiberoptic endoscopic evaluation of swallow completed at [facility]  Parish Valerio presents with a Mild-Moderate oropharyngeal dysphagia, primarily c/b mistiming of the pharyngeal stage with incomplete and/or delayed laryngeal vestibular closure. This resulted in laryngeal penetration of thin liquids during single sips in isolation, and entry of thin liquid material in trace volume below the vocal folds as liquid wash. There was similar presentation of mildly and moderately thick liquids. Cued coughs were effective as strategy to remove material from the glottic space. Suspected degree of reduce pharyngeal contraction and peristalsis given consistent pharyngeal stasis most notable with puree and cookie textures (mod- complete filling of vallecula and pyriform sinus    - Overall, the procedure did confirm aspiration risk, that appears present across thin liquids as well as modified liquids (nectar and honey-thick). However, Pt does possess ability to follow-through on compensatory strategies (single sips and self-cued coughs) and utilizing daily oral care, that can mitigate infection risk. Additionally he is afebrile, on room air, and improving mobility with PT. Considering his previous history of dysphagia, his current presentation may indicate an exacerbation from his acute illness and debility in ICU. SLP will continue to follow for tx and ongiong assessment. Updated CNP on results      Recommendations:  Regular Solids & thin liquids  Upright for all PO intake  Small bites/sips  Cued cough after sips of liquids  Daily  oral care routine  Pills per pt preference  SLP to continue to follow to ensure diet tolerance/determine readiness for repeat assessment as pt appropriate/schedule permits  If pt presents with any changes to mental, medical, or respiratory status, please make NPO and alert SLP  Treatment:  - Effortful swallow maneuver  - education and review on swallow strategies and oral care  - consider repeat instrumental (MBS) if aspiration remains a concern)            Oral phase: Within functional limits impairment detailed by no evidence of oral stasis or premature spillage .   Swallow Peristalsis (efficiency): Mild impairment defined by  Stasis due to reduced pharyngeal contraction/peristalsis  Airway protection: Mild-Moderate impairment detailed by consistent laryngeal penetration with: thin, mild, moderate; trace aspiration w/ thin; VF contact with mild/mod in trace volume    HISTORY    Past Medical History:   Diagnosis Date    CHF (congestive heart failure)     Coronary artery disease      Past Surgical History:   Procedure Laterality Date    CORONARY ANGIOPLASTY WITH STENT PLACEMENT      DENTAL SURGERY      TESTICLE SURGERY       SUBJECTIVE:Pt received setting up in bed. Pleasant during exam. A/O x 4  Room air    OBJECTIVE:        Baseline Assessment:       Oral/Motor Assessment:  Oral Hygiene: WFL  Dentition: Some Missing Teeth  Oral Motor: Within Functional Limits  Facial Sensation: Within Functional Limits  Facial Symmetry: Within Functional Limits  Labial Agility: Within Functional Limits  Labial Strength: Within Functional Limits  Labial Symmetry: Within Functional Limits  Lingual Agility: Within Functional Limits  Lingual ROM: Within Functional Limits  Lingual Strength: Within Functional Limits  Vocal Quality: Within Functional Limits  Intelligibility: Intelligible  Breath Support: Adequate for speech  Hearing: Within Functional Limits    FEES ASSESSMENT:   -The patient was identified by verbalizing name and date  of birth.  - The SLP verified that a FEES was the planned procedure for the day.  - The risks and benefits of the procedure were explained and patient has consented  to proceed with the study.    Flexible, fiberoptic nasoendoscope is passed through the left nares. Pt tolerated without difficulty.  The larynx is visualized and observed.    Nasopharyngoscopic findings:  Velopharyngeal Function WFL   Anatomic Findings  WFL     Pharyngoscopic & laryngoscopic findings  Secretions  min   Vocal Fold Function  Adequate glottic closure; ? Reduced mobility of left TVF/asymmetric appearance vs right TVF   Sensory Integrity  N/A   Anatomic Findings  IA space edematous          TREATMENT:  Following exam:  Pt verbally recalled the compensatory strategies taught during the FEES. He recalled at 100% accuracy with minimal cues and asked appropriate questions.

## 2025-01-28 ENCOUNTER — APPOINTMENT (OUTPATIENT)
Dept: RADIOLOGY | Facility: HOSPITAL | Age: 75
DRG: 235 | End: 2025-01-28
Payer: MEDICARE

## 2025-01-28 LAB
ALBUMIN SERPL BCP-MCNC: 3.7 G/DL (ref 3.4–5)
ALBUMIN SERPL BCP-MCNC: 3.8 G/DL (ref 3.4–5)
ANION GAP SERPL CALC-SCNC: 14 MMOL/L (ref 10–20)
ANION GAP SERPL CALC-SCNC: 15 MMOL/L (ref 10–20)
BUN SERPL-MCNC: 22 MG/DL (ref 6–23)
BUN SERPL-MCNC: 22 MG/DL (ref 6–23)
CALCIUM SERPL-MCNC: 8.6 MG/DL (ref 8.6–10.6)
CALCIUM SERPL-MCNC: 8.7 MG/DL (ref 8.6–10.6)
CHLORIDE SERPL-SCNC: 100 MMOL/L (ref 98–107)
CHLORIDE SERPL-SCNC: 102 MMOL/L (ref 98–107)
CO2 SERPL-SCNC: 27 MMOL/L (ref 21–32)
CO2 SERPL-SCNC: 29 MMOL/L (ref 21–32)
CREAT SERPL-MCNC: 0.68 MG/DL (ref 0.5–1.3)
CREAT SERPL-MCNC: 0.7 MG/DL (ref 0.5–1.3)
EGFRCR SERPLBLD CKD-EPI 2021: >90 ML/MIN/1.73M*2
EGFRCR SERPLBLD CKD-EPI 2021: >90 ML/MIN/1.73M*2
ERYTHROCYTE [DISTWIDTH] IN BLOOD BY AUTOMATED COUNT: 13.9 % (ref 11.5–14.5)
GLUCOSE BLD MANUAL STRIP-MCNC: 91 MG/DL (ref 74–99)
GLUCOSE SERPL-MCNC: 73 MG/DL (ref 74–99)
GLUCOSE SERPL-MCNC: 95 MG/DL (ref 74–99)
HCT VFR BLD AUTO: 31.7 % (ref 41–52)
HGB BLD-MCNC: 10.2 G/DL (ref 13.5–17.5)
MAGNESIUM SERPL-MCNC: 1.99 MG/DL (ref 1.6–2.4)
MCH RBC QN AUTO: 31.2 PG (ref 26–34)
MCHC RBC AUTO-ENTMCNC: 32.2 G/DL (ref 32–36)
MCV RBC AUTO: 97 FL (ref 80–100)
NRBC BLD-RTO: 0 /100 WBCS (ref 0–0)
PHOSPHATE SERPL-MCNC: 2.4 MG/DL (ref 2.5–4.9)
PHOSPHATE SERPL-MCNC: 2.8 MG/DL (ref 2.5–4.9)
PLATELET # BLD AUTO: 162 X10*3/UL (ref 150–450)
POTASSIUM SERPL-SCNC: 2.9 MMOL/L (ref 3.5–5.3)
POTASSIUM SERPL-SCNC: 3 MMOL/L (ref 3.5–5.3)
RBC # BLD AUTO: 3.27 X10*6/UL (ref 4.5–5.9)
SODIUM SERPL-SCNC: 140 MMOL/L (ref 136–145)
SODIUM SERPL-SCNC: 141 MMOL/L (ref 136–145)
WBC # BLD AUTO: 7.6 X10*3/UL (ref 4.4–11.3)

## 2025-01-28 PROCEDURE — 2500000002 HC RX 250 W HCPCS SELF ADMINISTERED DRUGS (ALT 637 FOR MEDICARE OP, ALT 636 FOR OP/ED): Performed by: NURSE PRACTITIONER

## 2025-01-28 PROCEDURE — 2500000001 HC RX 250 WO HCPCS SELF ADMINISTERED DRUGS (ALT 637 FOR MEDICARE OP): Performed by: NURSE PRACTITIONER

## 2025-01-28 PROCEDURE — 2500000004 HC RX 250 GENERAL PHARMACY W/ HCPCS (ALT 636 FOR OP/ED): Performed by: NURSE PRACTITIONER

## 2025-01-28 PROCEDURE — 94640 AIRWAY INHALATION TREATMENT: CPT

## 2025-01-28 PROCEDURE — 71046 X-RAY EXAM CHEST 2 VIEWS: CPT

## 2025-01-28 PROCEDURE — 94668 MNPJ CHEST WALL SBSQ: CPT

## 2025-01-28 PROCEDURE — 36415 COLL VENOUS BLD VENIPUNCTURE: CPT

## 2025-01-28 PROCEDURE — 94669 MECHANICAL CHEST WALL OSCILL: CPT

## 2025-01-28 PROCEDURE — 97530 THERAPEUTIC ACTIVITIES: CPT | Mod: GP

## 2025-01-28 PROCEDURE — 2500000005 HC RX 250 GENERAL PHARMACY W/O HCPCS: Performed by: NURSE PRACTITIONER

## 2025-01-28 PROCEDURE — 92526 ORAL FUNCTION THERAPY: CPT | Mod: GN | Performed by: SPEECH-LANGUAGE PATHOLOGIST

## 2025-01-28 PROCEDURE — 1200000002 HC GENERAL ROOM WITH TELEMETRY DAILY

## 2025-01-28 PROCEDURE — 83735 ASSAY OF MAGNESIUM: CPT | Performed by: NURSE PRACTITIONER

## 2025-01-28 PROCEDURE — 85027 COMPLETE CBC AUTOMATED: CPT | Performed by: NURSE PRACTITIONER

## 2025-01-28 PROCEDURE — 80069 RENAL FUNCTION PANEL: CPT | Performed by: NURSE PRACTITIONER

## 2025-01-28 PROCEDURE — 2500000002 HC RX 250 W HCPCS SELF ADMINISTERED DRUGS (ALT 637 FOR MEDICARE OP, ALT 636 FOR OP/ED): Performed by: STUDENT IN AN ORGANIZED HEALTH CARE EDUCATION/TRAINING PROGRAM

## 2025-01-28 PROCEDURE — 99232 SBSQ HOSP IP/OBS MODERATE 35: CPT

## 2025-01-28 PROCEDURE — 36415 COLL VENOUS BLD VENIPUNCTURE: CPT | Performed by: NURSE PRACTITIONER

## 2025-01-28 PROCEDURE — 71046 X-RAY EXAM CHEST 2 VIEWS: CPT | Performed by: RADIOLOGY

## 2025-01-28 PROCEDURE — 2500000001 HC RX 250 WO HCPCS SELF ADMINISTERED DRUGS (ALT 637 FOR MEDICARE OP)

## 2025-01-28 PROCEDURE — 2500000004 HC RX 250 GENERAL PHARMACY W/ HCPCS (ALT 636 FOR OP/ED)

## 2025-01-28 PROCEDURE — 82947 ASSAY GLUCOSE BLOOD QUANT: CPT

## 2025-01-28 PROCEDURE — 80069 RENAL FUNCTION PANEL: CPT

## 2025-01-28 RX ORDER — POTASSIUM CHLORIDE 1.5 G/1.58G
20 POWDER, FOR SOLUTION ORAL ONCE
Status: COMPLETED | OUTPATIENT
Start: 2025-01-28 | End: 2025-01-28

## 2025-01-28 RX ORDER — POTASSIUM CHLORIDE 1.5 G/1.58G
40 POWDER, FOR SOLUTION ORAL ONCE
Status: COMPLETED | OUTPATIENT
Start: 2025-01-28 | End: 2025-01-28

## 2025-01-28 RX ORDER — MAGNESIUM SULFATE HEPTAHYDRATE 40 MG/ML
2 INJECTION, SOLUTION INTRAVENOUS ONCE
Status: COMPLETED | OUTPATIENT
Start: 2025-01-28 | End: 2025-01-28

## 2025-01-28 RX ORDER — POTASSIUM CHLORIDE 14.9 MG/ML
20 INJECTION INTRAVENOUS ONCE
Status: COMPLETED | OUTPATIENT
Start: 2025-01-28 | End: 2025-01-29

## 2025-01-28 RX ORDER — FUROSEMIDE 10 MG/ML
20 INJECTION INTRAMUSCULAR; INTRAVENOUS ONCE
Status: COMPLETED | OUTPATIENT
Start: 2025-01-28 | End: 2025-01-28

## 2025-01-28 RX ADMIN — BUDESONIDE 0.5 MG: 0.5 INHALANT RESPIRATORY (INHALATION) at 09:01

## 2025-01-28 RX ADMIN — MAGNESIUM SULFATE HEPTAHYDRATE 2 G: 40 INJECTION, SOLUTION INTRAVENOUS at 12:23

## 2025-01-28 RX ADMIN — PIPERACILLIN SODIUM AND TAZOBACTAM SODIUM 4.5 G: 4; .5 INJECTION, SOLUTION INTRAVENOUS at 00:29

## 2025-01-28 RX ADMIN — POTASSIUM CHLORIDE 20 MEQ: 1.5 POWDER, FOR SOLUTION ORAL at 12:23

## 2025-01-28 RX ADMIN — LAMOTRIGINE 200 MG: 150 TABLET ORAL at 17:53

## 2025-01-28 RX ADMIN — METOPROLOL TARTRATE 25 MG: 25 TABLET, FILM COATED ORAL at 21:38

## 2025-01-28 RX ADMIN — IPRATROPIUM BROMIDE AND ALBUTEROL SULFATE 3 ML: .5; 3 SOLUTION RESPIRATORY (INHALATION) at 09:02

## 2025-01-28 RX ADMIN — POTASSIUM CHLORIDE 20 MEQ: 14.9 INJECTION, SOLUTION INTRAVENOUS at 21:39

## 2025-01-28 RX ADMIN — PIPERACILLIN SODIUM AND TAZOBACTAM SODIUM 4.5 G: 4; .5 INJECTION, SOLUTION INTRAVENOUS at 17:53

## 2025-01-28 RX ADMIN — IPRATROPIUM BROMIDE AND ALBUTEROL SULFATE 3 ML: .5; 3 SOLUTION RESPIRATORY (INHALATION) at 20:48

## 2025-01-28 RX ADMIN — AMIODARONE HYDROCHLORIDE 200 MG: 200 TABLET ORAL at 08:21

## 2025-01-28 RX ADMIN — ATORVASTATIN CALCIUM 80 MG: 80 TABLET, FILM COATED ORAL at 21:38

## 2025-01-28 RX ADMIN — FUROSEMIDE 20 MG: 10 INJECTION, SOLUTION INTRAVENOUS at 08:21

## 2025-01-28 RX ADMIN — METOPROLOL TARTRATE 25 MG: 25 TABLET, FILM COATED ORAL at 08:21

## 2025-01-28 RX ADMIN — ACETAMINOPHEN 975 MG: 325 TABLET ORAL at 04:09

## 2025-01-28 RX ADMIN — ARIPIPRAZOLE 10 MG: 5 TABLET ORAL at 08:21

## 2025-01-28 RX ADMIN — Medication 1 L/MIN: at 08:38

## 2025-01-28 RX ADMIN — HEPARIN SODIUM 5000 UNITS: 5000 INJECTION, SOLUTION INTRAVENOUS; SUBCUTANEOUS at 14:59

## 2025-01-28 RX ADMIN — Medication 5 MG: at 21:38

## 2025-01-28 RX ADMIN — ACETAMINOPHEN 975 MG: 325 TABLET ORAL at 12:23

## 2025-01-28 RX ADMIN — COLCHICINE 0.6 MG: 0.6 TABLET ORAL at 08:21

## 2025-01-28 RX ADMIN — PIPERACILLIN SODIUM AND TAZOBACTAM SODIUM 4.5 G: 4; .5 INJECTION, SOLUTION INTRAVENOUS at 12:23

## 2025-01-28 RX ADMIN — HEPARIN SODIUM 5000 UNITS: 5000 INJECTION, SOLUTION INTRAVENOUS; SUBCUTANEOUS at 04:09

## 2025-01-28 RX ADMIN — ASPIRIN 81 MG CHEWABLE TABLET 81 MG: 81 TABLET CHEWABLE at 08:21

## 2025-01-28 RX ADMIN — PIPERACILLIN SODIUM AND TAZOBACTAM SODIUM 4.5 G: 4; .5 INJECTION, SOLUTION INTRAVENOUS at 06:26

## 2025-01-28 RX ADMIN — POTASSIUM CHLORIDE 40 MEQ: 1.5 POWDER, FOR SOLUTION ORAL at 12:23

## 2025-01-28 RX ADMIN — PANTOPRAZOLE SODIUM 40 MG: 40 TABLET, DELAYED RELEASE ORAL at 08:21

## 2025-01-28 RX ADMIN — LAMOTRIGINE 150 MG: 150 TABLET ORAL at 21:38

## 2025-01-28 RX ADMIN — ACETAMINOPHEN 975 MG: 325 TABLET ORAL at 21:38

## 2025-01-28 RX ADMIN — MIRTAZAPINE 15 MG: 15 TABLET, FILM COATED ORAL at 21:38

## 2025-01-28 RX ADMIN — IPRATROPIUM BROMIDE AND ALBUTEROL SULFATE 3 ML: .5; 3 SOLUTION RESPIRATORY (INHALATION) at 14:07

## 2025-01-28 ASSESSMENT — COGNITIVE AND FUNCTIONAL STATUS - GENERAL
MOBILITY SCORE: 13
MOVING FROM LYING ON BACK TO SITTING ON SIDE OF FLAT BED WITH BEDRAILS: A LITTLE
STANDING UP FROM CHAIR USING ARMS: A LOT
DRESSING REGULAR LOWER BODY CLOTHING: A LITTLE
MOBILITY SCORE: 17
TURNING FROM BACK TO SIDE WHILE IN FLAT BAD: A LOT
CLIMB 3 TO 5 STEPS WITH RAILING: A LOT
PERSONAL GROOMING: A LITTLE
WALKING IN HOSPITAL ROOM: A LOT
DRESSING REGULAR UPPER BODY CLOTHING: A LITTLE
MOVING TO AND FROM BED TO CHAIR: A LOT
STANDING UP FROM CHAIR USING ARMS: A LITTLE
CLIMB 3 TO 5 STEPS WITH RAILING: A LOT
WALKING IN HOSPITAL ROOM: A LITTLE
TOILETING: A LITTLE
HELP NEEDED FOR BATHING: A LITTLE
MOVING FROM LYING ON BACK TO SITTING ON SIDE OF FLAT BED WITH BEDRAILS: A LITTLE
MOVING TO AND FROM BED TO CHAIR: A LITTLE
TURNING FROM BACK TO SIDE WHILE IN FLAT BAD: A LITTLE
DAILY ACTIVITIY SCORE: 19

## 2025-01-28 ASSESSMENT — PAIN SCALES - GENERAL
PAINLEVEL_OUTOF10: 0 - NO PAIN
PAINLEVEL_OUTOF10: 0 - NO PAIN
PAINLEVEL_OUTOF10: 4
PAINLEVEL_OUTOF10: 0 - NO PAIN

## 2025-01-28 ASSESSMENT — PAIN - FUNCTIONAL ASSESSMENT
PAIN_FUNCTIONAL_ASSESSMENT: 0-10

## 2025-01-28 NOTE — SIGNIFICANT EVENT
Rapid Response Nurse Note:  [x] RADAR alert/Score 6    Pager time: 449  Arrival time: 450  Event end time: 452  Location: LT 3028  [] Phone triage     Rapid response initiated by:  [] Rapid Response RN [] Family [] Nursing Supervisor [] Physician   [x] RADAR auto-page [] Sepsis auto-page [] RN [] RT   [] NP/PA [] Other:     Primary reason for call:   [] BAT [] New CPAP/BiPAP [] Bleeding [] Change in mental status   [] Chest pain [] Code blue [] FiO2 >/= 50% [] HR </= 40 bpm   [] HR >/= 130 bpm [] Hyperglycemia [] Hypoglycemia [x] RADAR    [] RR </= 8 bpm [] RR >/= 30 bpm [] SBP </= 90 mmHg [] SpO2 < 90%   [] Seizure [] Sepsis [] Staff concern:     Initial VS and/or RADAR VS:  radar vitals below in bold    Vitals:    25 0000 25 0200 25 0400 25 0450   BP: 132/67 138/65 135/69    BP Location: Left arm Left arm Left arm    Patient Position: Lying Lying Lying    Pulse: 60 57 61    Resp: 19 20 23 20   Temp: 36.3 °C (97.3 °F)  36.4 °C (97.5 °F)    TempSrc: Temporal Temporal Temporal    SpO2: 99% 96% 92% 99%   Weight:       Height:            Interventions:  [x] None [] ABG [] Assist w/ICU transfer [] BAT paged    [] Bag mask [] Blood [] Cardioversion [] Code Blue   [] Code blue for intubation [] Code status changed [] Chest x-ray [] EKG   [] IV fluid/bolus [] KUB x-ray [] Labs/cultures [] Medication   [] Nebulizer treatment [] NIPPV (CPAP/BiPAP) [] Oxygen [] Oral airway   [] Peripheral IV [] Palliative care consult [] CT/MRI [] Sepsis protocol    [] Suctioned [] Other:       Outcome:  [] Coded and  [] Code blue for intubation [] Coded and transferred to ICU []  on division   [x] Remained on division (no change) [] Remained on division + additional monitoring [] Remained in ED [] Transferred to ED   [] Transferred to ICU [] Transferred to inpatient status [] Transferred for interventions (procedure) [] Transferred to ICU stepdown    [] Transferred to surgery [] Transferred to  telemetry [] Sepsis protocol [] STEMI protocol   [] Stroke protocol [x] Bedside nurse instructed to page rapid response for any concerns or acute change in condition/VS     Additional Comments: Spoke to RN regarding vital signs.  No concerns from RN at this time.

## 2025-01-28 NOTE — CARE PLAN
Problem: Discharge Planning  Goal: Discharge to home or other facility with appropriate resources  Outcome: Progressing     Problem: Chronic Conditions and Co-morbidities  Goal: Patient's chronic conditions and co-morbidity symptoms are monitored and maintained or improved  Outcome: Progressing     Problem: Skin  Goal: Decreased wound size/increased tissue granulation at next dressing change  Outcome: Progressing  Goal: Participates in plan/prevention/treatment measures  Outcome: Progressing  Goal: Prevent/manage excess moisture  Outcome: Progressing  Goal: Prevent/minimize sheer/friction injuries  Outcome: Progressing  Goal: Promote/optimize nutrition  Outcome: Progressing  Goal: Promote skin healing  Outcome: Progressing       The clinical goals for the shift include Patient will sit in the chair for two hours by end of shift.  Shani Fowler RN
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
## 2025-01-28 NOTE — PROGRESS NOTES
"Inpatient  Speech-Language Pathology Treatment     Patient Name: Parish Valerio \"Bret\"  MRN: [id number]  Today's Date: 1/28/2025  Time Calculation  Start Time: 1406  Stop Time: 1420  Time Calculation (min): 14 min           Assessment/Plan:     # Mild-Moderate oropharyngeal dysphagia  - Follow-up this afternoon for diet analysis and compensatory strategy training/education. Demonstrating diet toleration, with no reported difficulties, and implementing strategies independently  - set-up oral care for pt to complete independently  - SLP to continue per POC     Recommendations:  Regular Solids & thin liquids  Upright for all PO intake  Small bites/sips  Cued cough after sips of liquids  Daily oral care routine  Pills per pt preference  SLP to continue to follow to ensure diet tolerance/determine readiness for repeat assessment as pt appropriate/schedule permits  If pt presents with any changes to mental, medical, or respiratory status, please make NPO and alert SLP  Treatment:  - Effortful swallow maneuver  - education and review on swallow strategies and oral care  - consider repeat instrumental (MBS) if aspiration remains a concern)             Plan:  Inpatient/Swing Bed or Outpatient: Inpatient  SLP Plan: Skilled SLP  SLP Frequency: 2x per week  SLP Discharge Recommendations:  (TbD)      Subjective   Resting in bed. A/O x 4  Room air   Patient Seen During This Visit: Yes  Prior to Session Communication: Bedside nurse        Objective       Therapeutic Swallow:  Therapeutic Swallow Intervention : Compensatory Strategies, PO Trials (Thin liquids x straw, several sips with 1-2 ounces of water)  Swallow Comments: Pt utilized self cued cough to 100% accuracy    Oral phase:  WFL    Pharyngeal Phase: appeared WFL      Encounter Problems       Encounter Problems (Active)       Swallowing       LTG - Patient will tolerate the least restrictive diet without overt difficulty by time of discharge.       Start:  01/27/25    " Expected End:  02/03/25            STG - Patient/Family will verbalize/demonstrate comprehension of dysphagia education, strategies, recommendations and POC To 80% acc. With min cues  (Progressing)       Start:  01/27/25    Expected End:  02/03/25            STG - Patient will tolerate recommended food and liquid consistencies without clinical signs and symptoms of aspirations on 80% of trials w/ min cues (Progressing)       Start:  01/27/25    Expected End:  02/03/25               Swallowing       STG - Patient will complete effortable swallows 30-40 times per session (Progressing)       Start:  01/27/25    Expected End:  02/03/25                     Inpatient:  Education:  Dysphagia tx results  Compensatory strategies  _Pt verbalized understanding and was eager to learn
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
## 2025-01-29 LAB
ALBUMIN SERPL BCP-MCNC: 3.7 G/DL (ref 3.4–5)
ANION GAP SERPL CALC-SCNC: 14 MMOL/L (ref 10–20)
BUN SERPL-MCNC: 23 MG/DL (ref 6–23)
CALCIUM SERPL-MCNC: 8.7 MG/DL (ref 8.6–10.6)
CHLORIDE SERPL-SCNC: 103 MMOL/L (ref 98–107)
CO2 SERPL-SCNC: 28 MMOL/L (ref 21–32)
CREAT SERPL-MCNC: 0.73 MG/DL (ref 0.5–1.3)
EGFRCR SERPLBLD CKD-EPI 2021: >90 ML/MIN/1.73M*2
ERYTHROCYTE [DISTWIDTH] IN BLOOD BY AUTOMATED COUNT: 13.7 % (ref 11.5–14.5)
GLUCOSE SERPL-MCNC: 76 MG/DL (ref 74–99)
HCT VFR BLD AUTO: 31.7 % (ref 41–52)
HGB BLD-MCNC: 10.7 G/DL (ref 13.5–17.5)
MAGNESIUM SERPL-MCNC: 2.08 MG/DL (ref 1.6–2.4)
MCH RBC QN AUTO: 31.8 PG (ref 26–34)
MCHC RBC AUTO-ENTMCNC: 33.8 G/DL (ref 32–36)
MCV RBC AUTO: 94 FL (ref 80–100)
NRBC BLD-RTO: 0 /100 WBCS (ref 0–0)
PHOSPHATE SERPL-MCNC: 2.7 MG/DL (ref 2.5–4.9)
PLATELET # BLD AUTO: 191 X10*3/UL (ref 150–450)
POTASSIUM SERPL-SCNC: 3.5 MMOL/L (ref 3.5–5.3)
RBC # BLD AUTO: 3.36 X10*6/UL (ref 4.5–5.9)
SODIUM SERPL-SCNC: 141 MMOL/L (ref 136–145)
WBC # BLD AUTO: 6.9 X10*3/UL (ref 4.4–11.3)

## 2025-01-29 PROCEDURE — 2500000001 HC RX 250 WO HCPCS SELF ADMINISTERED DRUGS (ALT 637 FOR MEDICARE OP): Performed by: NURSE PRACTITIONER

## 2025-01-29 PROCEDURE — 36415 COLL VENOUS BLD VENIPUNCTURE: CPT | Performed by: NURSE PRACTITIONER

## 2025-01-29 PROCEDURE — 2500000004 HC RX 250 GENERAL PHARMACY W/ HCPCS (ALT 636 FOR OP/ED)

## 2025-01-29 PROCEDURE — 97110 THERAPEUTIC EXERCISES: CPT | Mod: GP

## 2025-01-29 PROCEDURE — 2500000002 HC RX 250 W HCPCS SELF ADMINISTERED DRUGS (ALT 637 FOR MEDICARE OP, ALT 636 FOR OP/ED)

## 2025-01-29 PROCEDURE — 2500000004 HC RX 250 GENERAL PHARMACY W/ HCPCS (ALT 636 FOR OP/ED): Performed by: NURSE PRACTITIONER

## 2025-01-29 PROCEDURE — 85027 COMPLETE CBC AUTOMATED: CPT | Performed by: NURSE PRACTITIONER

## 2025-01-29 PROCEDURE — 94668 MNPJ CHEST WALL SBSQ: CPT

## 2025-01-29 PROCEDURE — 1200000002 HC GENERAL ROOM WITH TELEMETRY DAILY

## 2025-01-29 PROCEDURE — 99232 SBSQ HOSP IP/OBS MODERATE 35: CPT

## 2025-01-29 PROCEDURE — 2500000005 HC RX 250 GENERAL PHARMACY W/O HCPCS: Performed by: NURSE PRACTITIONER

## 2025-01-29 PROCEDURE — 2500000002 HC RX 250 W HCPCS SELF ADMINISTERED DRUGS (ALT 637 FOR MEDICARE OP, ALT 636 FOR OP/ED): Performed by: STUDENT IN AN ORGANIZED HEALTH CARE EDUCATION/TRAINING PROGRAM

## 2025-01-29 PROCEDURE — 2500000002 HC RX 250 W HCPCS SELF ADMINISTERED DRUGS (ALT 637 FOR MEDICARE OP, ALT 636 FOR OP/ED): Performed by: NURSE PRACTITIONER

## 2025-01-29 PROCEDURE — 94640 AIRWAY INHALATION TREATMENT: CPT

## 2025-01-29 PROCEDURE — 83735 ASSAY OF MAGNESIUM: CPT | Performed by: NURSE PRACTITIONER

## 2025-01-29 PROCEDURE — 2500000001 HC RX 250 WO HCPCS SELF ADMINISTERED DRUGS (ALT 637 FOR MEDICARE OP)

## 2025-01-29 PROCEDURE — 80069 RENAL FUNCTION PANEL: CPT | Performed by: NURSE PRACTITIONER

## 2025-01-29 RX ORDER — FUROSEMIDE 10 MG/ML
20 INJECTION INTRAMUSCULAR; INTRAVENOUS ONCE
Status: COMPLETED | OUTPATIENT
Start: 2025-01-29 | End: 2025-01-29

## 2025-01-29 RX ORDER — POTASSIUM CHLORIDE 20 MEQ/1
40 TABLET, EXTENDED RELEASE ORAL ONCE
Status: DISCONTINUED | OUTPATIENT
Start: 2025-01-29 | End: 2025-01-29

## 2025-01-29 RX ORDER — POTASSIUM CHLORIDE 1.5 G/1.58G
40 POWDER, FOR SOLUTION ORAL ONCE
Status: DISCONTINUED | OUTPATIENT
Start: 2025-01-29 | End: 2025-01-30

## 2025-01-29 RX ADMIN — PIPERACILLIN SODIUM AND TAZOBACTAM SODIUM 4.5 G: 4; .5 INJECTION, SOLUTION INTRAVENOUS at 07:18

## 2025-01-29 RX ADMIN — AMIODARONE HYDROCHLORIDE 200 MG: 200 TABLET ORAL at 09:16

## 2025-01-29 RX ADMIN — METOPROLOL TARTRATE 25 MG: 25 TABLET, FILM COATED ORAL at 09:16

## 2025-01-29 RX ADMIN — IPRATROPIUM BROMIDE AND ALBUTEROL SULFATE 3 ML: .5; 3 SOLUTION RESPIRATORY (INHALATION) at 22:40

## 2025-01-29 RX ADMIN — BUDESONIDE 0.5 MG: 0.5 INHALANT RESPIRATORY (INHALATION) at 09:22

## 2025-01-29 RX ADMIN — FUROSEMIDE 20 MG: 10 INJECTION, SOLUTION INTRAVENOUS at 12:41

## 2025-01-29 RX ADMIN — Medication 5 MG: at 22:11

## 2025-01-29 RX ADMIN — HEPARIN SODIUM 5000 UNITS: 5000 INJECTION, SOLUTION INTRAVENOUS; SUBCUTANEOUS at 00:03

## 2025-01-29 RX ADMIN — IPRATROPIUM BROMIDE AND ALBUTEROL SULFATE 3 ML: .5; 3 SOLUTION RESPIRATORY (INHALATION) at 09:22

## 2025-01-29 RX ADMIN — IPRATROPIUM BROMIDE AND ALBUTEROL SULFATE 3 ML: .5; 3 SOLUTION RESPIRATORY (INHALATION) at 14:23

## 2025-01-29 RX ADMIN — PIPERACILLIN SODIUM AND TAZOBACTAM SODIUM 4.5 G: 4; .5 INJECTION, SOLUTION INTRAVENOUS at 12:41

## 2025-01-29 RX ADMIN — ATORVASTATIN CALCIUM 80 MG: 80 TABLET, FILM COATED ORAL at 22:11

## 2025-01-29 RX ADMIN — BUDESONIDE 0.5 MG: 0.5 INHALANT RESPIRATORY (INHALATION) at 22:41

## 2025-01-29 RX ADMIN — LAMOTRIGINE 200 MG: 150 TABLET ORAL at 16:44

## 2025-01-29 RX ADMIN — METOPROLOL TARTRATE 25 MG: 25 TABLET, FILM COATED ORAL at 22:11

## 2025-01-29 RX ADMIN — LAMOTRIGINE 150 MG: 150 TABLET ORAL at 22:11

## 2025-01-29 RX ADMIN — PANTOPRAZOLE SODIUM 40 MG: 40 TABLET, DELAYED RELEASE ORAL at 09:15

## 2025-01-29 RX ADMIN — Medication 2 L/MIN: at 09:16

## 2025-01-29 RX ADMIN — HEPARIN SODIUM 5000 UNITS: 5000 INJECTION, SOLUTION INTRAVENOUS; SUBCUTANEOUS at 16:43

## 2025-01-29 RX ADMIN — ARIPIPRAZOLE 10 MG: 5 TABLET ORAL at 09:16

## 2025-01-29 RX ADMIN — POTASSIUM CHLORIDE 40 MEQ: 1500 TABLET, EXTENDED RELEASE ORAL at 12:41

## 2025-01-29 RX ADMIN — HEPARIN SODIUM 5000 UNITS: 5000 INJECTION, SOLUTION INTRAVENOUS; SUBCUTANEOUS at 09:16

## 2025-01-29 RX ADMIN — PIPERACILLIN SODIUM AND TAZOBACTAM SODIUM 4.5 G: 4; .5 INJECTION, SOLUTION INTRAVENOUS at 00:03

## 2025-01-29 RX ADMIN — ACETAMINOPHEN 975 MG: 325 TABLET ORAL at 16:43

## 2025-01-29 RX ADMIN — MIRTAZAPINE 15 MG: 15 TABLET, FILM COATED ORAL at 22:11

## 2025-01-29 RX ADMIN — PIPERACILLIN SODIUM AND TAZOBACTAM SODIUM 4.5 G: 4; .5 INJECTION, SOLUTION INTRAVENOUS at 22:25

## 2025-01-29 RX ADMIN — ACETAMINOPHEN 975 MG: 325 TABLET ORAL at 09:16

## 2025-01-29 RX ADMIN — ASPIRIN 81 MG CHEWABLE TABLET 81 MG: 81 TABLET CHEWABLE at 09:16

## 2025-01-29 RX ADMIN — ACETAMINOPHEN 975 MG: 325 TABLET ORAL at 22:11

## 2025-01-29 ASSESSMENT — PAIN - FUNCTIONAL ASSESSMENT
PAIN_FUNCTIONAL_ASSESSMENT: 0-10
PAIN_FUNCTIONAL_ASSESSMENT: 0-10

## 2025-01-29 ASSESSMENT — COGNITIVE AND FUNCTIONAL STATUS - GENERAL
WALKING IN HOSPITAL ROOM: A LITTLE
MOVING FROM LYING ON BACK TO SITTING ON SIDE OF FLAT BED WITH BEDRAILS: A LITTLE
CLIMB 3 TO 5 STEPS WITH RAILING: A LOT
TURNING FROM BACK TO SIDE WHILE IN FLAT BAD: A LITTLE
STANDING UP FROM CHAIR USING ARMS: A LITTLE
MOVING TO AND FROM BED TO CHAIR: A LITTLE
MOBILITY SCORE: 17

## 2025-01-29 ASSESSMENT — PAIN SCALES - GENERAL
PAINLEVEL_OUTOF10: 0 - NO PAIN

## 2025-01-29 NOTE — DISCHARGE INSTRUCTIONS
Don't forget to “Keep Your Move in the Tube!!”     Please refer to the “Move in the Tube” handout.  -- Load bearing activities can be completed if you are “staying in the tube.” If you are attempting load bearing activities, let pain be your guide with when trying an activity “out of the tube”. If an activity hurts or is uncomfortable go back to doing it while you stay “in the tube”. There is no time limit to “stay in the tube”.     -- Non-load bearing activities, which are your activities of daily living, can be completed with your arms “out of the tube” as long as you remain pain free. Some activities of daily living examples are dressing, personal care, showering, washing hair, and toilet hygiene.     Don't forget to KEEP YOUR MOVE IN THE TUBE and think of a [employer]. [place]on dinosaur!                                     Additional Post-Operative Instructions:  - Remember to use your Incentive spirometer 10x/hr while awake. Remember to cough and deep breath.  - No NSAIDs (common over-the-counter NSAIDs are ibuprofen/Motrin/Advil, naproxen/Naprosyn/Aleve) for 3 months after cardiac surgery; if NSAIDs needed after 3 months, clear use with cardiologist before starting.       Your home medications may have changed after surgery. Carefully compare this list with your prescription bottles at home and set aside any medications you are told to not take so you do not confuse them. Do not dispose of any medications until your follow-up, since your doctors may restart some at your follow-up appointments. It is important to bring a complete, current list of your medications to any medical appointments or hospitalizations.    For any questions about your discharge, please call the cardiac surgery office at [phone]

## 2025-01-29 NOTE — PROGRESS NOTES
01/29/25 1003   Discharge Planning   Expected Discharge Disposition SNF   Does the patient need discharge transport arranged? Yes   RoundTrip coordination needed? Yes   Has discharge transport been arranged? No     Spoke to patient's SO Elizabeth by phone. She requested referrals be sent to [facility] and [facility]. Sent through [employer].   Addendum: Both facilities are off line. Left messages for admissions but did not receive a call back. Elizabeth called and was updated and gave additional choices. Referral was placed.

## 2025-01-29 NOTE — PROGRESS NOTES
"Physical Therapy    Physical Therapy Treatment    Patient Name: Parish Valerio \"Bret\"  MRN: [id number]  Department: [place] [address]  Room: [address] [place] [address]  Today's Date: 1/29/2025  Time Calculation  Start Time: 1535  Stop Time: 1552  Time Calculation (min): 17 min         Assessment/Plan   PT Assessment  Physical Needs: Ambulating household distances limited by function/safety, 24hr mobility assistance needed, High falls risk due to function or environment  End of Session Communication: Bedside nurse  Assessment Comment: Pt tolerated session with fatigue.  Able to amb 64ft this session with CGA and RW.  Pt able to complete ther ex with no difficulty.  Pt too tired to amb 2nd trial this session.  Pt continues to benefit from skilled PT and remains appropriate for moderate intensity PT upon discharge.  End of Session Patient Position: Up in chair, Alarm on  PT Plan  Inpatient/Swing Bed or Outpatient: Inpatient  PT Plan  Treatment/Interventions: Bed mobility, Transfer training, Gait training, Stair training, Balance training, Endurance training, Strengthening, Therapeutic activity, Therapeutic exercise, Home exercise program, Positioning  PT Plan: Ongoing PT  PT Frequency: 5 times per week  PT Discharge Recommendations: Moderate intensity level of continued care  Equipment Recommended upon Discharge:  (TBD at next level of care)  PT Recommended Transfer Status: Assistive device, Contact guard (RW)  PT - OK to Discharge: Yes (eval complete and discharge recommendations made)      General Visit Information:   PT  Visit  PT Received On: 01/29/25  General  Reason for Referral: MIDCAB x2 LIMA to LAD, PRINCE to Diag  Past Medical History Relevant to Rehab: CAD s/p PCI (diag; 2001), stage C systolic HF/ICM/HFrEF with moderate LV dysfunction currently without an ICD, pAF on AAD and DOAC therapies (amiodarone 200mg daily, eliquis 5mg BID), and remote h/o polio at 18mos.  Prior to Session Communication: Bedside nurse, Physician  Patient " "Position Received: Bed, 3 rail up, Alarm on  General Comment: Pt cleared for PT by RN.  Pt alert and agreeable to PT. +PIVx2    Subjective   Precautions:  Precautions  Medical Precautions: Fall precautions, Cardiac precautions     Date/Time Vitals Session Patient Position Pulse Resp SpO2 BP MAP (mmHg)    01/29/25 1535 Pre PT  Lying  66  --  --  --  --     01/29/25 1545 During PT  Ambulating  70  --  --  --  --     01/29/25 1552 Post PT  Sitting  63  --  --  --  --      Objective   Pain:  Pain Assessment  Pain Assessment: 0-10  0-10 (Numeric) Pain Score: 0 - No pain  Cognition:  Cognition  Overall Cognitive Status: Within Functional Limits  Orientation Level: Oriented X4  Coordination:  Movements are Fluid and Coordinated: Yes  Postural Control:  Postural Control  Postural Control: Within Functional Limits  Static Sitting Balance  Static Sitting-Balance Support: Bilateral upper extremity supported, Feet supported  Static Sitting-Level of Assistance: Close supervision  Dynamic Sitting Balance  Dynamic Sitting-Balance Support: Bilateral upper extremity supported, Feet supported  Dynamic Sitting-Level of Assistance: Close supervision  Dynamic Sitting-Balance: Forward lean  Static Standing Balance  Static Standing-Balance Support: Bilateral upper extremity supported (RW)  Static Standing-Level of Assistance: Contact guard  Dynamic Standing Balance  Dynamic Standing-Balance Support: Bilateral upper extremity supported (RW)  Dynamic Standing-Level of Assistance: Contact guard  Dynamic Standing-Balance: Turning  Activity Tolerance:  Activity Tolerance  Endurance: Tolerates 10 - 20 min exercise with multiple rests  Treatments:  Therapeutic Exercise  Therapeutic Exercise Performed: Yes  Therapeutic Exercise Activity 1: supine ankle pumps, heel slides, quad sets x3\" hold, hip abd/add, seated marches, LAQx3\" hold x10 each LE    Therapeutic Activity  Therapeutic Activity Performed: Yes  Therapeutic Activity 1: bed mobility, " transfers, short distance amb, pt edu on importance of nursing staff in room prior to getting out of bed or chair, HEP, and progress observed today    Bed Mobility  Bed Mobility: Yes  Bed Mobility 1  Bed Mobility 1: Supine to sitting, Sitting to supine  Level of Assistance 1: Close supervision  Bed Mobility Comments 1: 2x throughout session    Ambulation/Gait Training  Ambulation/Gait Training Performed: Yes  Ambulation/Gait Training 1  Surface 1: Level tile  Device 1: Rolling walker  Assistance 1: Contact guard  Quality of Gait 1: Narrow base of support, Forward flexed posture, Foot drop/steppage gait, Decreased step length, Diminished heel strike, Shuffling gait (R foot drop)  Comments/Distance (ft) 1: 64ft  Transfers  Transfer: Yes  Transfer 1  Transfer From 1: Bed to  Transfer to 1: Stand  Technique 1: Sit to stand, Stand to sit  Transfer Device 1: Walker  Transfer Level of Assistance 1: Contact guard  Trials/Comments 1: 2x throughout session, VCs for hand placement both prior to standing and prior to sitting    Stairs  Stairs: No    Outcome Measures:  [facility] Basic Mobility  Turning from your back to your side while in a flat bed without using bedrails: A little  Moving from lying on your back to sitting on the side of a flat bed without using bedrails: A little  Moving to and from bed to chair (including a wheelchair): A little  Standing up from a chair using your arms (e.g. wheelchair or bedside chair): A little  To walk in hospital room: A little  Climbing 3-5 steps with railing: A lot  Basic Mobility - Total Score: 17    Education Documentation  Home Exercise Program, taught by Leah Swan, PT at 1/29/2025  4:03 PM.  Learner: Patient  Readiness: Acceptance  Method: Explanation  Response: Verbalizes Understanding    Body Mechanics, taught by Leah Swan, PT at 1/29/2025  4:03 PM.  Learner: Patient  Readiness: Acceptance  Method: Explanation  Response: Verbalizes Understanding    Mobility Training, taught  by Leah Swan, PT at 1/29/2025  4:03 PM.  Learner: Patient  Readiness: Acceptance  Method: Explanation  Response: Verbalizes Understanding    Education Comments  No comments found.        Encounter Problems       Encounter Problems (Active)       Balance       Pt will demonstrate ability to complete standing static/dynamic balance activities with unilateral UE support and no LOB for increase in safety up D/C.  (Progressing)       Start:  01/22/25    Expected End:  02/05/25               Mobility       Pt will demonstrated ability to ambulate >/=150ft with proper form and no balance deficits for safe home going.   (Progressing)       Start:  01/22/25    Expected End:  02/05/25            Pt will demonstrate ability to ascend/descend 2 stairs with unilateral rail and no balance deficits for safe home going.  (Not Progressing)       Start:  01/22/25    Expected End:  02/05/25               PT Transfers       Pt will demonstrated ability to complete bed mobility and sit<>stand transfers without assistance and use of assistive device to safely return home.  (Progressing)       Start:  01/22/25    Expected End:  02/05/25               Pain - Adult
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
## 2025-01-30 ENCOUNTER — APPOINTMENT (OUTPATIENT)
Dept: CARDIOLOGY | Facility: HOSPITAL | Age: 75
DRG: 235 | End: 2025-01-30
Payer: MEDICARE

## 2025-01-30 LAB
ALBUMIN SERPL BCP-MCNC: 3.8 G/DL (ref 3.4–5)
ANION GAP SERPL CALC-SCNC: 13 MMOL/L (ref 10–20)
ATRIAL RATE: 56 BPM
BUN SERPL-MCNC: 23 MG/DL (ref 6–23)
CALCIUM SERPL-MCNC: 9 MG/DL (ref 8.6–10.6)
CHLORIDE SERPL-SCNC: 103 MMOL/L (ref 98–107)
CO2 SERPL-SCNC: 30 MMOL/L (ref 21–32)
CREAT SERPL-MCNC: 0.77 MG/DL (ref 0.5–1.3)
EGFRCR SERPLBLD CKD-EPI 2021: >90 ML/MIN/1.73M*2
ERYTHROCYTE [DISTWIDTH] IN BLOOD BY AUTOMATED COUNT: 13.7 % (ref 11.5–14.5)
GLUCOSE SERPL-MCNC: 89 MG/DL (ref 74–99)
HCT VFR BLD AUTO: 32.2 % (ref 41–52)
HGB BLD-MCNC: 10.9 G/DL (ref 13.5–17.5)
MAGNESIUM SERPL-MCNC: 1.97 MG/DL (ref 1.6–2.4)
MCH RBC QN AUTO: 31.8 PG (ref 26–34)
MCHC RBC AUTO-ENTMCNC: 33.9 G/DL (ref 32–36)
MCV RBC AUTO: 94 FL (ref 80–100)
NRBC BLD-RTO: 0 /100 WBCS (ref 0–0)
P AXIS: 43 DEGREES
P OFFSET: 188 MS
P ONSET: 134 MS
PHOSPHATE SERPL-MCNC: 2.9 MG/DL (ref 2.5–4.9)
PLATELET # BLD AUTO: 217 X10*3/UL (ref 150–450)
POTASSIUM SERPL-SCNC: 3.9 MMOL/L (ref 3.5–5.3)
PR INTERVAL: 164 MS
Q ONSET: 216 MS
QRS COUNT: 9 BEATS
QRS DURATION: 98 MS
QT INTERVAL: 500 MS
QTC CALCULATION(BAZETT): 482 MS
QTC FREDERICIA: 489 MS
R AXIS: 8 DEGREES
RBC # BLD AUTO: 3.43 X10*6/UL (ref 4.5–5.9)
SODIUM SERPL-SCNC: 142 MMOL/L (ref 136–145)
T AXIS: 65 DEGREES
T OFFSET: 466 MS
VENTRICULAR RATE: 56 BPM
WBC # BLD AUTO: 6.1 X10*3/UL (ref 4.4–11.3)

## 2025-01-30 PROCEDURE — 2500000001 HC RX 250 WO HCPCS SELF ADMINISTERED DRUGS (ALT 637 FOR MEDICARE OP)

## 2025-01-30 PROCEDURE — 2500000001 HC RX 250 WO HCPCS SELF ADMINISTERED DRUGS (ALT 637 FOR MEDICARE OP): Performed by: NURSE PRACTITIONER

## 2025-01-30 PROCEDURE — 99233 SBSQ HOSP IP/OBS HIGH 50: CPT

## 2025-01-30 PROCEDURE — 94640 AIRWAY INHALATION TREATMENT: CPT

## 2025-01-30 PROCEDURE — 85027 COMPLETE CBC AUTOMATED: CPT | Performed by: NURSE PRACTITIONER

## 2025-01-30 PROCEDURE — 1200000002 HC GENERAL ROOM WITH TELEMETRY DAILY

## 2025-01-30 PROCEDURE — 2500000005 HC RX 250 GENERAL PHARMACY W/O HCPCS

## 2025-01-30 PROCEDURE — 93005 ELECTROCARDIOGRAM TRACING: CPT

## 2025-01-30 PROCEDURE — 2500000004 HC RX 250 GENERAL PHARMACY W/ HCPCS (ALT 636 FOR OP/ED)

## 2025-01-30 PROCEDURE — 83735 ASSAY OF MAGNESIUM: CPT | Performed by: NURSE PRACTITIONER

## 2025-01-30 PROCEDURE — 2500000002 HC RX 250 W HCPCS SELF ADMINISTERED DRUGS (ALT 637 FOR MEDICARE OP, ALT 636 FOR OP/ED): Performed by: STUDENT IN AN ORGANIZED HEALTH CARE EDUCATION/TRAINING PROGRAM

## 2025-01-30 PROCEDURE — 2500000002 HC RX 250 W HCPCS SELF ADMINISTERED DRUGS (ALT 637 FOR MEDICARE OP, ALT 636 FOR OP/ED): Performed by: NURSE PRACTITIONER

## 2025-01-30 PROCEDURE — 2500000004 HC RX 250 GENERAL PHARMACY W/ HCPCS (ALT 636 FOR OP/ED): Performed by: NURSE PRACTITIONER

## 2025-01-30 PROCEDURE — 80069 RENAL FUNCTION PANEL: CPT | Performed by: NURSE PRACTITIONER

## 2025-01-30 PROCEDURE — 36415 COLL VENOUS BLD VENIPUNCTURE: CPT | Performed by: NURSE PRACTITIONER

## 2025-01-30 RX ORDER — TALC
6 POWDER (GRAM) TOPICAL NIGHTLY
Status: DISCONTINUED | OUTPATIENT
Start: 2025-01-30 | End: 2025-02-04 | Stop reason: HOSPADM

## 2025-01-30 RX ORDER — FUROSEMIDE 10 MG/ML
40 INJECTION INTRAMUSCULAR; INTRAVENOUS 2 TIMES DAILY
Status: DISPENSED | OUTPATIENT
Start: 2025-01-30 | End: 2025-02-02

## 2025-01-30 RX ORDER — METOLAZONE 5 MG/1
2.5 TABLET ORAL ONCE
Status: COMPLETED | OUTPATIENT
Start: 2025-01-30 | End: 2025-01-30

## 2025-01-30 RX ORDER — FUROSEMIDE 10 MG/ML
20 INJECTION INTRAMUSCULAR; INTRAVENOUS DAILY
Status: DISCONTINUED | OUTPATIENT
Start: 2025-01-30 | End: 2025-01-30

## 2025-01-30 RX ORDER — SACUBITRIL AND VALSARTAN 24; 26 MG/1; MG/1
0.5 TABLET, FILM COATED ORAL 2 TIMES DAILY
Status: DISCONTINUED | OUTPATIENT
Start: 2025-01-30 | End: 2025-02-04 | Stop reason: HOSPADM

## 2025-01-30 RX ORDER — BUDESONIDE 0.5 MG/2ML
0.5 INHALANT ORAL
Status: DISCONTINUED | OUTPATIENT
Start: 2025-01-31 | End: 2025-02-04 | Stop reason: HOSPADM

## 2025-01-30 RX ADMIN — PIPERACILLIN SODIUM AND TAZOBACTAM SODIUM 4.5 G: 4; .5 INJECTION, SOLUTION INTRAVENOUS at 17:26

## 2025-01-30 RX ADMIN — HEPARIN SODIUM 5000 UNITS: 5000 INJECTION, SOLUTION INTRAVENOUS; SUBCUTANEOUS at 00:28

## 2025-01-30 RX ADMIN — METOPROLOL TARTRATE 25 MG: 25 TABLET, FILM COATED ORAL at 09:07

## 2025-01-30 RX ADMIN — FUROSEMIDE 20 MG: 10 INJECTION, SOLUTION INTRAVENOUS at 09:06

## 2025-01-30 RX ADMIN — PIPERACILLIN SODIUM AND TAZOBACTAM SODIUM 4.5 G: 4; .5 INJECTION, SOLUTION INTRAVENOUS at 04:32

## 2025-01-30 RX ADMIN — MELATONIN 6 MG: 3 TAB ORAL at 20:36

## 2025-01-30 RX ADMIN — ACETAMINOPHEN 975 MG: 325 TABLET ORAL at 22:03

## 2025-01-30 RX ADMIN — IPRATROPIUM BROMIDE AND ALBUTEROL SULFATE 3 ML: .5; 3 SOLUTION RESPIRATORY (INHALATION) at 14:38

## 2025-01-30 RX ADMIN — ASPIRIN 81 MG CHEWABLE TABLET 81 MG: 81 TABLET CHEWABLE at 09:07

## 2025-01-30 RX ADMIN — METOLAZONE 2.5 MG: 5 TABLET ORAL at 11:05

## 2025-01-30 RX ADMIN — ACETAMINOPHEN 975 MG: 325 TABLET ORAL at 17:26

## 2025-01-30 RX ADMIN — AMIODARONE HYDROCHLORIDE 200 MG: 200 TABLET ORAL at 09:07

## 2025-01-30 RX ADMIN — ARIPIPRAZOLE 10 MG: 5 TABLET ORAL at 09:07

## 2025-01-30 RX ADMIN — HEPARIN SODIUM 5000 UNITS: 5000 INJECTION, SOLUTION INTRAVENOUS; SUBCUTANEOUS at 09:07

## 2025-01-30 RX ADMIN — PIPERACILLIN SODIUM AND TAZOBACTAM SODIUM 4.5 G: 4; .5 INJECTION, SOLUTION INTRAVENOUS at 22:03

## 2025-01-30 RX ADMIN — FUROSEMIDE 40 MG: 10 INJECTION, SOLUTION INTRAVENOUS at 17:28

## 2025-01-30 RX ADMIN — LAMOTRIGINE 200 MG: 150 TABLET ORAL at 17:28

## 2025-01-30 RX ADMIN — IPRATROPIUM BROMIDE AND ALBUTEROL SULFATE 3 ML: .5; 3 SOLUTION RESPIRATORY (INHALATION) at 08:53

## 2025-01-30 RX ADMIN — SACUBITRIL AND VALSARTAN 0.5 TABLET: 24; 26 TABLET, FILM COATED ORAL at 09:30

## 2025-01-30 RX ADMIN — PIPERACILLIN SODIUM AND TAZOBACTAM SODIUM 4.5 G: 4; .5 INJECTION, SOLUTION INTRAVENOUS at 11:05

## 2025-01-30 RX ADMIN — ATORVASTATIN CALCIUM 80 MG: 80 TABLET, FILM COATED ORAL at 20:37

## 2025-01-30 RX ADMIN — HEPARIN SODIUM 5000 UNITS: 5000 INJECTION, SOLUTION INTRAVENOUS; SUBCUTANEOUS at 17:28

## 2025-01-30 RX ADMIN — LAMOTRIGINE 150 MG: 150 TABLET ORAL at 22:03

## 2025-01-30 RX ADMIN — METOPROLOL TARTRATE 25 MG: 25 TABLET, FILM COATED ORAL at 20:38

## 2025-01-30 RX ADMIN — MIRTAZAPINE 15 MG: 15 TABLET, FILM COATED ORAL at 20:37

## 2025-01-30 RX ADMIN — PANTOPRAZOLE SODIUM 40 MG: 40 TABLET, DELAYED RELEASE ORAL at 09:07

## 2025-01-30 ASSESSMENT — COGNITIVE AND FUNCTIONAL STATUS - GENERAL
MOBILITY SCORE: 19
DRESSING REGULAR LOWER BODY CLOTHING: A LITTLE
CLIMB 3 TO 5 STEPS WITH RAILING: A LOT
MOVING TO AND FROM BED TO CHAIR: A LITTLE
DAILY ACTIVITIY SCORE: 19
WALKING IN HOSPITAL ROOM: A LITTLE
DRESSING REGULAR UPPER BODY CLOTHING: A LITTLE
HELP NEEDED FOR BATHING: A LITTLE
STANDING UP FROM CHAIR USING ARMS: A LITTLE
PERSONAL GROOMING: A LITTLE
TOILETING: A LITTLE

## 2025-01-30 ASSESSMENT — PAIN SCALES - GENERAL
PAINLEVEL_OUTOF10: 0 - NO PAIN

## 2025-01-30 ASSESSMENT — PAIN - FUNCTIONAL ASSESSMENT
PAIN_FUNCTIONAL_ASSESSMENT: 0-10
PAIN_FUNCTIONAL_ASSESSMENT: 0-10
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
## 2025-01-31 LAB
ALBUMIN SERPL BCP-MCNC: 4.1 G/DL (ref 3.4–5)
ANION GAP SERPL CALC-SCNC: 18 MMOL/L (ref 10–20)
BUN SERPL-MCNC: 19 MG/DL (ref 6–23)
CALCIUM SERPL-MCNC: 9.3 MG/DL (ref 8.6–10.6)
CHLORIDE SERPL-SCNC: 92 MMOL/L (ref 98–107)
CO2 SERPL-SCNC: 29 MMOL/L (ref 21–32)
CREAT SERPL-MCNC: 0.75 MG/DL (ref 0.5–1.3)
EGFRCR SERPLBLD CKD-EPI 2021: >90 ML/MIN/1.73M*2
ERYTHROCYTE [DISTWIDTH] IN BLOOD BY AUTOMATED COUNT: 13.5 % (ref 11.5–14.5)
GLUCOSE SERPL-MCNC: 81 MG/DL (ref 74–99)
HCT VFR BLD AUTO: 33.6 % (ref 41–52)
HGB BLD-MCNC: 11.3 G/DL (ref 13.5–17.5)
MAGNESIUM SERPL-MCNC: 1.9 MG/DL (ref 1.6–2.4)
MCH RBC QN AUTO: 31.2 PG (ref 26–34)
MCHC RBC AUTO-ENTMCNC: 33.6 G/DL (ref 32–36)
MCV RBC AUTO: 93 FL (ref 80–100)
NRBC BLD-RTO: 0 /100 WBCS (ref 0–0)
PHOSPHATE SERPL-MCNC: 3.4 MG/DL (ref 2.5–4.9)
PLATELET # BLD AUTO: 249 X10*3/UL (ref 150–450)
POTASSIUM SERPL-SCNC: 2.7 MMOL/L (ref 3.5–5.3)
RBC # BLD AUTO: 3.62 X10*6/UL (ref 4.5–5.9)
SODIUM SERPL-SCNC: 136 MMOL/L (ref 136–145)
WBC # BLD AUTO: 7 X10*3/UL (ref 4.4–11.3)

## 2025-01-31 PROCEDURE — 2500000004 HC RX 250 GENERAL PHARMACY W/ HCPCS (ALT 636 FOR OP/ED)

## 2025-01-31 PROCEDURE — 99232 SBSQ HOSP IP/OBS MODERATE 35: CPT

## 2025-01-31 PROCEDURE — 83735 ASSAY OF MAGNESIUM: CPT | Performed by: NURSE PRACTITIONER

## 2025-01-31 PROCEDURE — 97116 GAIT TRAINING THERAPY: CPT | Mod: GP

## 2025-01-31 PROCEDURE — 2500000005 HC RX 250 GENERAL PHARMACY W/O HCPCS: Performed by: NURSE PRACTITIONER

## 2025-01-31 PROCEDURE — 2500000005 HC RX 250 GENERAL PHARMACY W/O HCPCS

## 2025-01-31 PROCEDURE — 97110 THERAPEUTIC EXERCISES: CPT | Mod: GP

## 2025-01-31 PROCEDURE — 2500000001 HC RX 250 WO HCPCS SELF ADMINISTERED DRUGS (ALT 637 FOR MEDICARE OP): Performed by: NURSE PRACTITIONER

## 2025-01-31 PROCEDURE — 36415 COLL VENOUS BLD VENIPUNCTURE: CPT | Performed by: NURSE PRACTITIONER

## 2025-01-31 PROCEDURE — 97535 SELF CARE MNGMENT TRAINING: CPT | Mod: GO

## 2025-01-31 PROCEDURE — 97530 THERAPEUTIC ACTIVITIES: CPT | Mod: GO

## 2025-01-31 PROCEDURE — 2500000004 HC RX 250 GENERAL PHARMACY W/ HCPCS (ALT 636 FOR OP/ED): Performed by: NURSE PRACTITIONER

## 2025-01-31 PROCEDURE — 85027 COMPLETE CBC AUTOMATED: CPT | Performed by: NURSE PRACTITIONER

## 2025-01-31 PROCEDURE — 94669 MECHANICAL CHEST WALL OSCILL: CPT

## 2025-01-31 PROCEDURE — 1200000002 HC GENERAL ROOM WITH TELEMETRY DAILY

## 2025-01-31 PROCEDURE — 2500000002 HC RX 250 W HCPCS SELF ADMINISTERED DRUGS (ALT 637 FOR MEDICARE OP, ALT 636 FOR OP/ED): Performed by: STUDENT IN AN ORGANIZED HEALTH CARE EDUCATION/TRAINING PROGRAM

## 2025-01-31 PROCEDURE — 94668 MNPJ CHEST WALL SBSQ: CPT

## 2025-01-31 PROCEDURE — 2500000002 HC RX 250 W HCPCS SELF ADMINISTERED DRUGS (ALT 637 FOR MEDICARE OP, ALT 636 FOR OP/ED): Performed by: NURSE PRACTITIONER

## 2025-01-31 PROCEDURE — 94640 AIRWAY INHALATION TREATMENT: CPT

## 2025-01-31 PROCEDURE — 80069 RENAL FUNCTION PANEL: CPT | Performed by: NURSE PRACTITIONER

## 2025-01-31 PROCEDURE — 2500000002 HC RX 250 W HCPCS SELF ADMINISTERED DRUGS (ALT 637 FOR MEDICARE OP, ALT 636 FOR OP/ED)

## 2025-01-31 RX ORDER — POTASSIUM CHLORIDE 14.9 MG/ML
20 INJECTION INTRAVENOUS
Status: DISPENSED | OUTPATIENT
Start: 2025-01-31 | End: 2025-01-31

## 2025-01-31 RX ORDER — POTASSIUM CHLORIDE 20 MEQ/1
40 TABLET, EXTENDED RELEASE ORAL ONCE
Status: COMPLETED | OUTPATIENT
Start: 2025-01-31 | End: 2025-01-31

## 2025-01-31 RX ORDER — POTASSIUM CHLORIDE 1.5 G/1.58G
40 POWDER, FOR SOLUTION ORAL ONCE
Status: DISCONTINUED | OUTPATIENT
Start: 2025-01-31 | End: 2025-01-31

## 2025-01-31 RX ORDER — LANOLIN ALCOHOL/MO/W.PET/CERES
400 CREAM (GRAM) TOPICAL ONCE
Status: COMPLETED | OUTPATIENT
Start: 2025-01-31 | End: 2025-01-31

## 2025-01-31 RX ADMIN — ASPIRIN 81 MG CHEWABLE TABLET 81 MG: 81 TABLET CHEWABLE at 09:06

## 2025-01-31 RX ADMIN — HEPARIN SODIUM 5000 UNITS: 5000 INJECTION, SOLUTION INTRAVENOUS; SUBCUTANEOUS at 23:58

## 2025-01-31 RX ADMIN — HEPARIN SODIUM 5000 UNITS: 5000 INJECTION, SOLUTION INTRAVENOUS; SUBCUTANEOUS at 17:14

## 2025-01-31 RX ADMIN — IPRATROPIUM BROMIDE AND ALBUTEROL SULFATE 3 ML: .5; 3 SOLUTION RESPIRATORY (INHALATION) at 09:26

## 2025-01-31 RX ADMIN — MELATONIN 6 MG: 3 TAB ORAL at 20:35

## 2025-01-31 RX ADMIN — METOPROLOL TARTRATE 25 MG: 25 TABLET, FILM COATED ORAL at 09:07

## 2025-01-31 RX ADMIN — POTASSIUM CHLORIDE 40 MEQ: 1500 TABLET, EXTENDED RELEASE ORAL at 10:57

## 2025-01-31 RX ADMIN — POTASSIUM CHLORIDE 40 MEQ: 1500 TABLET, EXTENDED RELEASE ORAL at 09:15

## 2025-01-31 RX ADMIN — ACETAMINOPHEN 975 MG: 325 TABLET ORAL at 17:06

## 2025-01-31 RX ADMIN — PIPERACILLIN SODIUM AND TAZOBACTAM SODIUM 4.5 G: 4; .5 INJECTION, SOLUTION INTRAVENOUS at 10:54

## 2025-01-31 RX ADMIN — AMIODARONE HYDROCHLORIDE 200 MG: 200 TABLET ORAL at 09:07

## 2025-01-31 RX ADMIN — HEPARIN SODIUM 5000 UNITS: 5000 INJECTION, SOLUTION INTRAVENOUS; SUBCUTANEOUS at 09:05

## 2025-01-31 RX ADMIN — POTASSIUM CHLORIDE 40 MEQ: 1500 TABLET, EXTENDED RELEASE ORAL at 17:07

## 2025-01-31 RX ADMIN — ACETAMINOPHEN 975 MG: 325 TABLET ORAL at 04:19

## 2025-01-31 RX ADMIN — LAMOTRIGINE 200 MG: 150 TABLET ORAL at 17:07

## 2025-01-31 RX ADMIN — ATORVASTATIN CALCIUM 80 MG: 80 TABLET, FILM COATED ORAL at 20:35

## 2025-01-31 RX ADMIN — ACETAMINOPHEN 975 MG: 325 TABLET ORAL at 22:07

## 2025-01-31 RX ADMIN — MIRTAZAPINE 15 MG: 15 TABLET, FILM COATED ORAL at 20:36

## 2025-01-31 RX ADMIN — MAGNESIUM OXIDE TAB 400 MG (241.3 MG ELEMENTAL MG) 400 MG: 400 (241.3 MG) TAB at 17:07

## 2025-01-31 RX ADMIN — PIPERACILLIN SODIUM AND TAZOBACTAM SODIUM 4.5 G: 4; .5 INJECTION, SOLUTION INTRAVENOUS at 04:19

## 2025-01-31 RX ADMIN — PIPERACILLIN SODIUM AND TAZOBACTAM SODIUM 4.5 G: 4; .5 INJECTION, SOLUTION INTRAVENOUS at 22:07

## 2025-01-31 RX ADMIN — PIPERACILLIN SODIUM AND TAZOBACTAM SODIUM 4.5 G: 4; .5 INJECTION, SOLUTION INTRAVENOUS at 17:10

## 2025-01-31 RX ADMIN — Medication 21 PERCENT: at 09:26

## 2025-01-31 RX ADMIN — ACETAMINOPHEN 975 MG: 325 TABLET ORAL at 09:07

## 2025-01-31 RX ADMIN — PANTOPRAZOLE SODIUM 40 MG: 40 TABLET, DELAYED RELEASE ORAL at 09:06

## 2025-01-31 RX ADMIN — BUDESONIDE 0.5 MG: 0.5 INHALANT RESPIRATORY (INHALATION) at 20:15

## 2025-01-31 RX ADMIN — LAMOTRIGINE 150 MG: 150 TABLET ORAL at 20:35

## 2025-01-31 RX ADMIN — IPRATROPIUM BROMIDE AND ALBUTEROL SULFATE 3 ML: .5; 3 SOLUTION RESPIRATORY (INHALATION) at 15:49

## 2025-01-31 RX ADMIN — IPRATROPIUM BROMIDE AND ALBUTEROL SULFATE 3 ML: .5; 3 SOLUTION RESPIRATORY (INHALATION) at 20:15

## 2025-01-31 RX ADMIN — ARIPIPRAZOLE 10 MG: 5 TABLET ORAL at 09:05

## 2025-01-31 RX ADMIN — HEPARIN SODIUM 5000 UNITS: 5000 INJECTION, SOLUTION INTRAVENOUS; SUBCUTANEOUS at 00:40

## 2025-01-31 RX ADMIN — BUDESONIDE 0.5 MG: 0.5 INHALANT RESPIRATORY (INHALATION) at 09:26

## 2025-01-31 ASSESSMENT — COGNITIVE AND FUNCTIONAL STATUS - GENERAL
MOBILITY SCORE: 17
TURNING FROM BACK TO SIDE WHILE IN FLAT BAD: A LITTLE
DRESSING REGULAR LOWER BODY CLOTHING: A LITTLE
PERSONAL GROOMING: A LITTLE
TOILETING: A LITTLE
CLIMB 3 TO 5 STEPS WITH RAILING: A LOT
MOVING TO AND FROM BED TO CHAIR: A LITTLE
WALKING IN HOSPITAL ROOM: A LITTLE
DAILY ACTIVITIY SCORE: 19
STANDING UP FROM CHAIR USING ARMS: A LITTLE
DRESSING REGULAR UPPER BODY CLOTHING: A LITTLE
MOVING FROM LYING ON BACK TO SITTING ON SIDE OF FLAT BED WITH BEDRAILS: A LITTLE
HELP NEEDED FOR BATHING: A LITTLE

## 2025-01-31 ASSESSMENT — PAIN - FUNCTIONAL ASSESSMENT
PAIN_FUNCTIONAL_ASSESSMENT: 0-10

## 2025-01-31 ASSESSMENT — PAIN SCALES - GENERAL
PAINLEVEL_OUTOF10: 0 - NO PAIN
PAINLEVEL_OUTOF10: 1
PAINLEVEL_OUTOF10: 3
PAINLEVEL_OUTOF10: 0 - NO PAIN

## 2025-01-31 ASSESSMENT — ACTIVITIES OF DAILY LIVING (ADL)
BATHING_LEVEL_OF_ASSISTANCE: MINIMUM ASSISTANCE
HOME_MANAGEMENT_TIME_ENTRY: 17

## 2025-01-31 NOTE — PROGRESS NOTES
Transitional Care Coordination Progress Note:  Patient was discussed during interdisciplinary rounds.  Team members present: Cardiac surgery NP's, FAM and TCC  Plan per surgical team: Potassium low. Monitoring rhythm.  Payer: [employer]  Status: Inpatient  Discharge disposition: SNF  Potential barriers: abnormal lab  ADOD: 1-2 days  Spoke to patient's SO Elizabeth to let her know that [facility] does not have a bed. Referral was sent to [facility] at her request.  Amanda Wells RN
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
## 2025-01-31 NOTE — PROGRESS NOTES
"  Occupational Therapy    Occupational Therapy Treatment    Name: Parish Valerio \"Bret\"  MRN: [id number]  : 1950  Date: 25  Room: [address] [place] [address]A    Time Calculation  Start Time: 1019  Stop Time: 1046  Time Calculation (min): 27 min    Assessment:  OT Assessment: Decreased I/ADLs, functional transfers and mobility. Pt would benefit from mod intensity therapy to address functional balance & endurance to facilitate safe homegoing/decrease risk of falls.  Prognosis: Good  Barriers to Discharge Home: Physical needs  Physical Needs: Ambulating household distances limited by function/safety, Intermittent mobility assistance needed, High falls risk due to function or environment  Evaluation/Treatment Tolerance: Patient tolerated treatment well  Medical Staff Made Aware: Yes  End of Session Communication: Bedside nurse  End of Session Patient Position: Up in chair, Alarm on  Plan:  Treatment Interventions: ADL retraining, Functional transfer training, Endurance training, Patient/family training, Equipment evaluation/education, Compensatory technique education  OT Frequency: 2 times per week  OT Discharge Recommendations: Moderate intensity level of continued care  Equipment Recommended upon Discharge: Wheeled walker  OT Recommended Transfer Status: Assist of 1, Assistive equipment (Comment) (wheeled walker)  OT - OK to Discharge: Yes    Subjective   General:  OT Last Visit  OT Received On: 25  Reason for Referral: MIDCAB x2 LIMA to LAD, PRINCE to Diag  Referred By: Dr. Carrasco  Past Medical History Relevant to Rehab: CAD s/p PCI (diag; ), stage C systolic HF/ICM/HFrEF with moderate LV dysfunction currently without an ICD, pAF on AAD and DOAC therapies (amiodarone 200mg daily, eliquis 5mg BID), and remote h/o polio at 18mos.  Prior to Session Communication: Bedside nurse  Patient Position Received: Bed, 3 rail up, Alarm on  Family/Caregiver Present: No  General Comment: Pt cleared for OT. Pt pleasant & " receptive to therapy, participates fully.   Precautions:  Medical Precautions: Fall precautions, Cardiac precautions  Post-Surgical Precautions: Abdominal surgery precautions  Vitals:   Date/Time Vitals Session Patient Position Pulse Resp SpO2 BP MAP (mmHg)               01/31/25 1019 During OT  --  70  --  --  --  --                Cognition:  Overall Cognitive Status: Within Functional Limits  Orientation Level: Oriented X4    Pain Assessment:  Pain Assessment  Pain Assessment: 0-10  0-10 (Numeric) Pain Score: 0 - No pain     Objective   Activities of Daily Living:      Grooming  Grooming Level of Assistance: Close supervision  Grooming Where Assessed: Standing sinkside  Grooming Comments: Pt combed hair & completed oral hygiene.  UE Bathing  UE Bathing Level of Assistance: Close supervision, Setup  UE Bathing Where Assessed: Edge of bed  LE Bathing  LE Bathing Level of Assistance: Minimum assistance  LE Bathing Where Assessed:  (sitting/standing at edge of bed)  LE Bathing Comments: Assist required for thorough posterior bathing with bath wipe.  UE Dressing  UE Dressing Level of Assistance: Close supervision, Setup  UE Dressing Where Assessed: Edge of bed  UE Dressing Comments: doff/don gown  LE Dressing  LE Dressing: Yes  Sock Level of Assistance: Close supervision  LE Dressing Where Assessed: Edge of bed  LE Dressing Comments: doff/don socks via figure 4 techniques. Increased time needed to complete.       Functional Standing Tolerance:  Functional Standing Tolerance  Time: ~3 minutes  Activity: walking to/from bathroom & perfoming standing grooming activities at sink.  Functional Mobility  Functional Mobility Performed: Yes  Functional Mobility 1  Surface 1: Level tile  Device 1: Rolling walker  Assistance 1: Contact guard  Quality of Functional Mobility 1:  (R foot drop)  Comments 1: Pt performed functional mobility chair>bathroom, min verbal cues for safe walker management & how to safely manage threshold  in/out of bathroom for fall prevention.  Bed Mobility/Transfers:   Bed Mobility  Bed Mobility: Yes  Bed Mobility 1  Bed Mobility 1: Supine to sitting  Level of Assistance 1: Close supervision  Bed Mobility Comments 1: HOB minially elevated  Transfers  Transfer: Yes  Transfer 1  Transfer From 1: Bed to, Chair with arms to  Transfer to 1: Stand  Technique 1: Sit to stand  Transfer Device 1: Walker  Transfer Level of Assistance 1: Contact guard  Trials/Comments 1: x3 from EOB, x2 from chair  Transfers 2  Transfer From 2: Stand to  Transfer to 2: Bed, Chair with arms  Technique 2: Stand to sit  Transfer Device 2: Walker  Transfer Level of Assistance 2: Contact guard  Trials/Comments 2: cued for hand placement  Transfers 3  Transfer From 3: Bed to  Transfer to 3: Chair with arms  Technique 3: Stand pivot  Transfer Device 3: Walker  Transfer Level of Assistance 3: Contact guard  Trials/Comments 3: verbal cues for safe walker placement prior to initiating sit.      Outcome Measures:  [facility] Daily Activity  Putting on and taking off regular lower body clothing: A little  Bathing (including washing, rinsing, drying): A little  Putting on and taking off regular upper body clothing: A little  Toileting, which includes using toilet, bedpan or urinal: A little  Taking care of personal grooming such as brushing teeth: A little  Eating Meals: None  Daily Activity - Total Score: 19     Education Documentation  Body Mechanics, taught by Sue Mcclure OT at 1/31/2025 11:07 AM.  Learner: Patient  Readiness: Acceptance  Method: Explanation, Demonstration  Response: Verbalizes Understanding  Comment: OT POC, fall prevention, energy conservation, safe AD management with transfers/mobility    Precautions, taught by Sue Mcclure OT at 1/31/2025 11:07 AM.  Learner: Patient  Readiness: Acceptance  Method: Explanation, Demonstration  Response: Verbalizes Understanding  Comment: OT POC, fall prevention, energy conservation, safe AD  management with transfers/mobility    ADL Training, taught by Sue Mcclure OT at 1/31/2025 11:07 AM.  Learner: Patient  Readiness: Acceptance  Method: Explanation, Demonstration  Response: Verbalizes Understanding  Comment: OT POC, fall prevention, energy conservation, safe AD management with transfers/mobility    Education Comments  No comments found.      Goals:  Encounter Problems       Encounter Problems (Active)       ADLs       Patient will complete lower body dressing with SBA  for donning and doffing all LE clothes in order to increase Indep with task participation.  (Progressing)       Start:  01/24/25    Expected End:  02/07/25            Patient will complete toileting, including clothing management and hygiene, with SBA in order to maximize functional Indep with task completion.  (Progressing)       Start:  01/24/25    Expected End:  02/07/25               BALANCE       Pt will increase static/dynamic stand to Good to increase safety and indep with functional task completion.   (Progressing)       Start:  01/24/25    Expected End:  02/07/25               COGNITION/SAFETY       Pt will identify and incorporate 3 EC/WS strategies into daily routines for increased safety and Indep.  (Progressing)       Start:  01/24/25    Expected End:  02/07/25               EXERCISE/STRENGTHENING       Pt will increase overall BUE strength to 4/5 in order to increase functional task participation.  (Progressing)       Start:  01/24/25    Expected End:  02/07/25            Pt will increase endurance to tolerate 15-20min of activity with no more than 1 rest break in order to increase ability to engage in ADL completion.  (Progressing)       Start:  01/24/25    Expected End:  02/07/25               MOBILITY       Pt will demo increased functional mobility to tolerate tasks necessary to complete ADL routine with SBA. (Progressing)       Start:  01/24/25    Expected End:  02/07/25               TRANSFERS       Patient will  complete functional transfers using least restrictive device with SBA   in order to maximize functional potential and increase safety.  (Progressing)       Start:  01/24/25    Expected End:  02/07/25 01/31/25 at 11:08 AM   Sue Mcclure, OT   [phone]

## 2025-01-31 NOTE — PROGRESS NOTES
"Physical Therapy    Physical Therapy Treatment    Patient Name: Parish Valerio \"Bret\"  MRN: [id number]  Department: [place] [id number]  Room: [address] [place] [address]  Today's Date: 1/31/2025  Time Calculation  Start Time: 1150  Stop Time: 1213  Time Calculation (min): 23 min         Assessment/Plan   PT Assessment  Physical Needs: Ambulating household distances limited by function/safety, 24hr mobility assistance needed, High falls risk due to function or environment, Stair navigation into home limited by function/safety  End of Session Communication: Bedside nurse  Assessment Comment: Pt tolerated session well.  Able to amb increased distances this session with multiple seated rest breaks.  Pt continues to require CGA for amb due to soft knees and R foot inversion intermittently putting pt at risk for rolling R ankle.  Pt completed ther ex with no difficulty.  Pt continues to benefit from skilled PT and remains appropriate for moderate intensity PT upon discharge.  End of Session Patient Position: Bed, 3 rail up, Alarm on  PT Plan  Inpatient/Swing Bed or Outpatient: Inpatient  PT Plan  Treatment/Interventions: Bed mobility, Transfer training, Gait training, Stair training, Balance training, Endurance training, Strengthening, Therapeutic activity, Therapeutic exercise, Home exercise program, Positioning  PT Plan: Ongoing PT  PT Frequency: 5 times per week  PT Discharge Recommendations: Moderate intensity level of continued care  Equipment Recommended upon Discharge:  (TBD at next level of care)  PT Recommended Transfer Status: Assistive device, Contact guard (RW)  PT - OK to Discharge: Yes (eval complete and discharge recommendations made)      General Visit Information:   PT  Visit  PT Received On: 01/31/25  General  Reason for Referral: MIDCAB x2 LIMA to LAD, PRINCE to Diag  Past Medical History Relevant to Rehab: CAD s/p PCI (diag; 2001), stage C systolic HF/ICM/HFrEF with moderate LV dysfunction currently without an ICD, pAF on AAD " and DOAC therapies (amiodarone 200mg daily, eliquis 5mg BID), and remote h/o polio at 18mos.  Prior to Session Communication: Bedside nurse  Patient Position Received: Up in chair, Alarm on  General Comment: Pt cleared for PT by RN.  Pt alert and agreeable to PT. +PIVx2, tele    Subjective   Precautions:  Precautions  Medical Precautions: Fall precautions, Cardiac precautions     Date/Time Vitals Session Patient Position Pulse Resp SpO2 BP MAP (mmHg)    01/31/25 1150 Pre PT  Sitting  58  --  --  --  --     01/31/25 1204 During PT  Ambulating  72  --  --  --  --     01/31/25 1213 Post PT  Lying  58  --  --  --  --      Objective   Pain:  Pain Assessment  Pain Assessment: 0-10  0-10 (Numeric) Pain Score: 0 - No pain  Cognition:  Cognition  Overall Cognitive Status: Within Functional Limits  Coordination:  Movements are Fluid and Coordinated: Yes  Postural Control:  Postural Control  Postural Control: Within Functional Limits  Static Sitting Balance  Static Sitting-Balance Support: Bilateral upper extremity supported, Feet supported  Static Sitting-Level of Assistance: Close supervision  Dynamic Sitting Balance  Dynamic Sitting-Balance Support: Bilateral upper extremity supported, Feet supported  Dynamic Sitting-Level of Assistance: Close supervision  Dynamic Sitting-Balance: Forward lean  Static Standing Balance  Static Standing-Balance Support: Bilateral upper extremity supported (RW)  Static Standing-Level of Assistance: Contact guard  Dynamic Standing Balance  Dynamic Standing-Balance Support: Bilateral upper extremity supported (RW)  Dynamic Standing-Level of Assistance: Contact guard  Dynamic Standing-Balance: Turning  Activity Tolerance:  Activity Tolerance  Endurance: Tolerates 10 - 20 min exercise with multiple rests  Rate of Perceived Dyspnea (RPD): 3/10 with amb, resolving with 1-2 minute seated rest break each time  Treatments:  Therapeutic Exercise  Therapeutic Exercise Performed: Yes  Therapeutic Exercise  "Activity 1: seated marches, LAQx3\" hold, supine ankle pumps, heel slides, quad sets x3\" hold, hip abd/add x10 each LE    Bed Mobility  Bed Mobility: Yes  Bed Mobility 1  Bed Mobility 1: Sitting to supine  Level of Assistance 1: Close supervision  Bed Mobility Comments 1: HOB slightly elevated  Bed Mobility 2  Bed Mobility  2: Scooting (posteriorly to HOB in longsit)  Level of Assistance 2: Close supervision    Ambulation/Gait Training  Ambulation/Gait Training Performed: Yes  Ambulation/Gait Training 1  Surface 1: Level tile  Device 1: Rolling walker  Assistance 1: Contact guard  Quality of Gait 1: Narrow base of support, Forward flexed posture, Foot drop/steppage gait, Decreased step length, Diminished heel strike, Shuffling gait (R foot drop, R foot inversion intermittently-pt denies wearing brace on R, slightly soft knees)  Comments/Distance (ft) 1: 45ft, 100ft, 75ft, 45ft  Transfers  Transfer: Yes  Transfer 1  Transfer From 1: Chair with arms to  Transfer to 1: Stand  Technique 1: Sit to stand  Transfer Device 1: Walker  Transfer Level of Assistance 1: Contact guard  Transfers 2  Transfer From 2: Stand to  Transfer to 2:  (bench without arms)  Technique 2: Stand to sit, Sit to stand  Transfer Device 2: Walker  Transfer Level of Assistance 2: Contact guard  Trials/Comments 2: VCs for hand placement when sitting down, VCs to bring walker all the way with pt before sitting down.  3x throughout session    Stairs  Stairs: No    Outcome Measures:  [facility] Basic Mobility  Turning from your back to your side while in a flat bed without using bedrails: A little  Moving from lying on your back to sitting on the side of a flat bed without using bedrails: A little  Moving to and from bed to chair (including a wheelchair): A little  Standing up from a chair using your arms (e.g. wheelchair or bedside chair): A little  To walk in hospital room: A little  Climbing 3-5 steps with railing: A lot  Basic Mobility - Total Score: " 17    Education Documentation  Home Exercise Program, taught by Leah Swan, PT at 1/31/2025 12:31 PM.  Learner: Patient  Readiness: Acceptance  Method: Explanation  Response: Verbalizes Understanding    Body Mechanics, taught by Leah Swan PT at 1/31/2025 12:31 PM.  Learner: Patient  Readiness: Acceptance  Method: Explanation  Response: Verbalizes Understanding    Mobility Training, taught by Leah Swan, PT at 1/31/2025 12:31 PM.  Learner: Patient  Readiness: Acceptance  Method: Explanation  Response: Verbalizes Understanding    Education Comments  No comments found.      Encounter Problems       Encounter Problems (Active)       Balance       Pt will demonstrate ability to complete standing static/dynamic balance activities with unilateral UE support and no LOB for increase in safety up D/C.  (Progressing)       Start:  01/22/25    Expected End:  02/05/25               Mobility       Pt will demonstrated ability to ambulate >/=150ft with proper form and no balance deficits for safe home going.   (Progressing)       Start:  01/22/25    Expected End:  02/05/25            Pt will demonstrate ability to ascend/descend 2 stairs with unilateral rail and no balance deficits for safe home going.  (Not Progressing)       Start:  01/22/25    Expected End:  02/05/25               PT Transfers       Pt will demonstrated ability to complete bed mobility and sit<>stand transfers without assistance and use of assistive device to safely return home.  (Progressing)       Start:  01/22/25    Expected End:  02/05/25               Pain - Adult

## 2025-01-31 NOTE — PROGRESS NOTES
01/31/25 1419   Discharge Planning   Expected Discharge Disposition SNF  ([facility])     [facility] is able to accept patient on Saturday or Sunday. Patient and his SO Elizabeth were notified. Requested Discharge Support initiate precert process.

## 2025-02-01 LAB
ALBUMIN SERPL BCP-MCNC: 4.2 G/DL (ref 3.4–5)
ANION GAP SERPL CALC-SCNC: 15 MMOL/L (ref 10–20)
BUN SERPL-MCNC: 21 MG/DL (ref 6–23)
CALCIUM SERPL-MCNC: 9.6 MG/DL (ref 8.6–10.6)
CHLORIDE SERPL-SCNC: 96 MMOL/L (ref 98–107)
CO2 SERPL-SCNC: 30 MMOL/L (ref 21–32)
CREAT SERPL-MCNC: 1 MG/DL (ref 0.5–1.3)
EGFRCR SERPLBLD CKD-EPI 2021: 79 ML/MIN/1.73M*2
ERYTHROCYTE [DISTWIDTH] IN BLOOD BY AUTOMATED COUNT: 13.8 % (ref 11.5–14.5)
GLUCOSE SERPL-MCNC: 92 MG/DL (ref 74–99)
HCT VFR BLD AUTO: 33.8 % (ref 41–52)
HGB BLD-MCNC: 11.6 G/DL (ref 13.5–17.5)
MAGNESIUM SERPL-MCNC: 2.01 MG/DL (ref 1.6–2.4)
MCH RBC QN AUTO: 31.6 PG (ref 26–34)
MCHC RBC AUTO-ENTMCNC: 34.3 G/DL (ref 32–36)
MCV RBC AUTO: 92 FL (ref 80–100)
NRBC BLD-RTO: 0 /100 WBCS (ref 0–0)
PHOSPHATE SERPL-MCNC: 3.5 MG/DL (ref 2.5–4.9)
PLATELET # BLD AUTO: 283 X10*3/UL (ref 150–450)
POTASSIUM SERPL-SCNC: 3.4 MMOL/L (ref 3.5–5.3)
RBC # BLD AUTO: 3.67 X10*6/UL (ref 4.5–5.9)
SODIUM SERPL-SCNC: 138 MMOL/L (ref 136–145)
WBC # BLD AUTO: 6.8 X10*3/UL (ref 4.4–11.3)

## 2025-02-01 PROCEDURE — 85027 COMPLETE CBC AUTOMATED: CPT | Performed by: NURSE PRACTITIONER

## 2025-02-01 PROCEDURE — 2500000002 HC RX 250 W HCPCS SELF ADMINISTERED DRUGS (ALT 637 FOR MEDICARE OP, ALT 636 FOR OP/ED): Performed by: STUDENT IN AN ORGANIZED HEALTH CARE EDUCATION/TRAINING PROGRAM

## 2025-02-01 PROCEDURE — 2500000001 HC RX 250 WO HCPCS SELF ADMINISTERED DRUGS (ALT 637 FOR MEDICARE OP)

## 2025-02-01 PROCEDURE — 83735 ASSAY OF MAGNESIUM: CPT | Performed by: NURSE PRACTITIONER

## 2025-02-01 PROCEDURE — 2500000002 HC RX 250 W HCPCS SELF ADMINISTERED DRUGS (ALT 637 FOR MEDICARE OP, ALT 636 FOR OP/ED)

## 2025-02-01 PROCEDURE — 2500000005 HC RX 250 GENERAL PHARMACY W/O HCPCS: Performed by: NURSE PRACTITIONER

## 2025-02-01 PROCEDURE — 36415 COLL VENOUS BLD VENIPUNCTURE: CPT | Performed by: NURSE PRACTITIONER

## 2025-02-01 PROCEDURE — 94640 AIRWAY INHALATION TREATMENT: CPT

## 2025-02-01 PROCEDURE — 2500000002 HC RX 250 W HCPCS SELF ADMINISTERED DRUGS (ALT 637 FOR MEDICARE OP, ALT 636 FOR OP/ED): Performed by: NURSE PRACTITIONER

## 2025-02-01 PROCEDURE — 1200000002 HC GENERAL ROOM WITH TELEMETRY DAILY

## 2025-02-01 PROCEDURE — 2500000004 HC RX 250 GENERAL PHARMACY W/ HCPCS (ALT 636 FOR OP/ED): Performed by: NURSE PRACTITIONER

## 2025-02-01 PROCEDURE — 94760 N-INVAS EAR/PLS OXIMETRY 1: CPT

## 2025-02-01 PROCEDURE — 99232 SBSQ HOSP IP/OBS MODERATE 35: CPT

## 2025-02-01 PROCEDURE — 94669 MECHANICAL CHEST WALL OSCILL: CPT

## 2025-02-01 PROCEDURE — 2500000001 HC RX 250 WO HCPCS SELF ADMINISTERED DRUGS (ALT 637 FOR MEDICARE OP): Performed by: NURSE PRACTITIONER

## 2025-02-01 PROCEDURE — 80069 RENAL FUNCTION PANEL: CPT | Performed by: NURSE PRACTITIONER

## 2025-02-01 PROCEDURE — 2500000004 HC RX 250 GENERAL PHARMACY W/ HCPCS (ALT 636 FOR OP/ED)

## 2025-02-01 PROCEDURE — 2500000005 HC RX 250 GENERAL PHARMACY W/O HCPCS

## 2025-02-01 PROCEDURE — 94668 MNPJ CHEST WALL SBSQ: CPT

## 2025-02-01 RX ORDER — POTASSIUM CHLORIDE 20 MEQ/1
40 TABLET, EXTENDED RELEASE ORAL ONCE
Status: COMPLETED | OUTPATIENT
Start: 2025-02-01 | End: 2025-02-01

## 2025-02-01 RX ORDER — METOPROLOL TARTRATE 25 MG/1
25 TABLET, FILM COATED ORAL 2 TIMES DAILY
Status: DISCONTINUED | OUTPATIENT
Start: 2025-02-01 | End: 2025-02-04 | Stop reason: HOSPADM

## 2025-02-01 RX ORDER — METOPROLOL TARTRATE 25 MG/1
25 TABLET, FILM COATED ORAL ONCE
Status: DISCONTINUED | OUTPATIENT
Start: 2025-02-01 | End: 2025-02-01

## 2025-02-01 RX ORDER — METOPROLOL SUCCINATE 25 MG/1
25 TABLET, EXTENDED RELEASE ORAL DAILY
Status: DISCONTINUED | OUTPATIENT
Start: 2025-02-02 | End: 2025-02-01

## 2025-02-01 RX ORDER — COLCHICINE 0.6 MG/1
0.3 TABLET ORAL DAILY
Status: DISCONTINUED | OUTPATIENT
Start: 2025-02-02 | End: 2025-02-04 | Stop reason: HOSPADM

## 2025-02-01 RX ORDER — METOPROLOL SUCCINATE 25 MG/1
25 TABLET, EXTENDED RELEASE ORAL DAILY
Status: DISCONTINUED | OUTPATIENT
Start: 2025-02-01 | End: 2025-02-01

## 2025-02-01 RX ADMIN — HEPARIN SODIUM 5000 UNITS: 5000 INJECTION, SOLUTION INTRAVENOUS; SUBCUTANEOUS at 09:45

## 2025-02-01 RX ADMIN — LAMOTRIGINE 200 MG: 150 TABLET ORAL at 16:16

## 2025-02-01 RX ADMIN — MELATONIN 6 MG: 3 TAB ORAL at 20:38

## 2025-02-01 RX ADMIN — BUDESONIDE 0.5 MG: 0.5 INHALANT RESPIRATORY (INHALATION) at 10:35

## 2025-02-01 RX ADMIN — AMIODARONE HYDROCHLORIDE 200 MG: 200 TABLET ORAL at 09:45

## 2025-02-01 RX ADMIN — MIRTAZAPINE 15 MG: 15 TABLET, FILM COATED ORAL at 20:39

## 2025-02-01 RX ADMIN — ACETAMINOPHEN 975 MG: 325 TABLET ORAL at 20:40

## 2025-02-01 RX ADMIN — HEPARIN SODIUM 5000 UNITS: 5000 INJECTION, SOLUTION INTRAVENOUS; SUBCUTANEOUS at 17:46

## 2025-02-01 RX ADMIN — FUROSEMIDE 40 MG: 10 INJECTION, SOLUTION INTRAVENOUS at 17:46

## 2025-02-01 RX ADMIN — ATORVASTATIN CALCIUM 80 MG: 80 TABLET, FILM COATED ORAL at 20:39

## 2025-02-01 RX ADMIN — METOPROLOL TARTRATE 25 MG: 25 TABLET, FILM COATED ORAL at 09:45

## 2025-02-01 RX ADMIN — PIPERACILLIN SODIUM AND TAZOBACTAM SODIUM 4.5 G: 4; .5 INJECTION, SOLUTION INTRAVENOUS at 03:54

## 2025-02-01 RX ADMIN — PANTOPRAZOLE SODIUM 40 MG: 40 TABLET, DELAYED RELEASE ORAL at 06:09

## 2025-02-01 RX ADMIN — IPRATROPIUM BROMIDE AND ALBUTEROL SULFATE 3 ML: .5; 3 SOLUTION RESPIRATORY (INHALATION) at 21:01

## 2025-02-01 RX ADMIN — PIPERACILLIN SODIUM AND TAZOBACTAM SODIUM 4.5 G: 4; .5 INJECTION, SOLUTION INTRAVENOUS at 09:46

## 2025-02-01 RX ADMIN — ARIPIPRAZOLE 10 MG: 5 TABLET ORAL at 09:45

## 2025-02-01 RX ADMIN — IPRATROPIUM BROMIDE AND ALBUTEROL SULFATE 3 ML: .5; 3 SOLUTION RESPIRATORY (INHALATION) at 10:32

## 2025-02-01 RX ADMIN — ACETAMINOPHEN 975 MG: 325 TABLET ORAL at 03:54

## 2025-02-01 RX ADMIN — IPRATROPIUM BROMIDE AND ALBUTEROL SULFATE 3 ML: .5; 3 SOLUTION RESPIRATORY (INHALATION) at 15:57

## 2025-02-01 RX ADMIN — ACETAMINOPHEN 975 MG: 325 TABLET ORAL at 09:45

## 2025-02-01 RX ADMIN — BUDESONIDE 0.5 MG: 0.5 INHALANT RESPIRATORY (INHALATION) at 21:01

## 2025-02-01 RX ADMIN — ASPIRIN 81 MG CHEWABLE TABLET 81 MG: 81 TABLET CHEWABLE at 09:45

## 2025-02-01 RX ADMIN — FUROSEMIDE 40 MG: 10 INJECTION, SOLUTION INTRAVENOUS at 06:09

## 2025-02-01 RX ADMIN — SACUBITRIL AND VALSARTAN 0.5 TABLET: 24; 26 TABLET, FILM COATED ORAL at 20:39

## 2025-02-01 RX ADMIN — Medication 21 PERCENT: at 09:00

## 2025-02-01 RX ADMIN — POTASSIUM CHLORIDE 40 MEQ: 1500 TABLET, EXTENDED RELEASE ORAL at 11:49

## 2025-02-01 RX ADMIN — HEPARIN SODIUM 5000 UNITS: 5000 INJECTION, SOLUTION INTRAVENOUS; SUBCUTANEOUS at 23:50

## 2025-02-01 RX ADMIN — METOPROLOL TARTRATE 25 MG: 25 TABLET, FILM COATED ORAL at 20:39

## 2025-02-01 RX ADMIN — SACUBITRIL AND VALSARTAN 0.5 TABLET: 24; 26 TABLET, FILM COATED ORAL at 09:45

## 2025-02-01 RX ADMIN — LAMOTRIGINE 150 MG: 150 TABLET ORAL at 20:38

## 2025-02-01 ASSESSMENT — PAIN SCALES - GENERAL
PAINLEVEL_OUTOF10: 0 - NO PAIN
PAINLEVEL_OUTOF10: 4
PAINLEVEL_OUTOF10: 0 - NO PAIN
PAINLEVEL_OUTOF10: 0 - NO PAIN

## 2025-02-01 ASSESSMENT — COGNITIVE AND FUNCTIONAL STATUS - GENERAL
TURNING FROM BACK TO SIDE WHILE IN FLAT BAD: A LITTLE
STANDING UP FROM CHAIR USING ARMS: A LITTLE
MOVING FROM LYING ON BACK TO SITTING ON SIDE OF FLAT BED WITH BEDRAILS: A LITTLE
CLIMB 3 TO 5 STEPS WITH RAILING: A LITTLE
STANDING UP FROM CHAIR USING ARMS: A LITTLE
MOBILITY SCORE: 18
DRESSING REGULAR UPPER BODY CLOTHING: A LITTLE
MOVING FROM LYING ON BACK TO SITTING ON SIDE OF FLAT BED WITH BEDRAILS: A LITTLE
MOVING TO AND FROM BED TO CHAIR: A LITTLE
MOVING TO AND FROM BED TO CHAIR: A LITTLE
HELP NEEDED FOR BATHING: A LITTLE
WALKING IN HOSPITAL ROOM: A LITTLE
CLIMB 3 TO 5 STEPS WITH RAILING: A LITTLE
DRESSING REGULAR LOWER BODY CLOTHING: A LITTLE
TURNING FROM BACK TO SIDE WHILE IN FLAT BAD: A LITTLE
TOILETING: A LITTLE
DAILY ACTIVITIY SCORE: 20
WALKING IN HOSPITAL ROOM: A LITTLE
MOBILITY SCORE: 18

## 2025-02-01 ASSESSMENT — PAIN - FUNCTIONAL ASSESSMENT
PAIN_FUNCTIONAL_ASSESSMENT: 0-10
PAIN_FUNCTIONAL_ASSESSMENT: 0-10

## 2025-02-01 ASSESSMENT — PAIN SCALES - WONG BAKER
WONGBAKER_NUMERICALRESPONSE: HURTS LITTLE BIT
WONGBAKER_NUMERICALRESPONSE: NO HURT

## 2025-02-01 NOTE — CARE PLAN
Problem: Discharge Planning  Goal: Discharge to home or other facility with appropriate resources  Outcome: Progressing     Problem: Chronic Conditions and Co-morbidities  Goal: Patient's chronic conditions and co-morbidity symptoms are monitored and maintained or improved  Outcome: Progressing     Problem: Skin  Goal: Decreased wound size/increased tissue granulation at next dressing change  Outcome: Progressing  Goal: Participates in plan/prevention/treatment measures  Outcome: Progressing  Goal: Prevent/manage excess moisture  Outcome: Progressing  Goal: Prevent/minimize sheer/friction injuries  Outcome: Progressing  Goal: Promote/optimize nutrition  Outcome: Progressing  Goal: Promote skin healing  Outcome: Progressing    The clinical goals for the shift include Pt will remain HDS throughout shift
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
## 2025-02-01 NOTE — CARE PLAN
The patient's goals for the shift include Eat some food    The clinical goals for the shift include pt will increase activity this shift    Over the shift, the patient did not make progress toward the following goals. Barriers to progression include . Recommendations to address these barriers include .

## 2025-02-02 VITALS
WEIGHT: 122.6 LBS | OXYGEN SATURATION: 95 % | RESPIRATION RATE: 18 BRPM | HEART RATE: 67 BPM | SYSTOLIC BLOOD PRESSURE: 92 MMHG | DIASTOLIC BLOOD PRESSURE: 54 MMHG | HEIGHT: 69 IN | BODY MASS INDEX: 18.16 KG/M2 | TEMPERATURE: 98.2 F

## 2025-02-02 LAB
ALBUMIN SERPL BCP-MCNC: 4.6 G/DL (ref 3.4–5)
ANION GAP SERPL CALC-SCNC: 16 MMOL/L (ref 10–20)
BUN SERPL-MCNC: 26 MG/DL (ref 6–23)
CALCIUM SERPL-MCNC: 10.2 MG/DL (ref 8.6–10.6)
CHLORIDE SERPL-SCNC: 95 MMOL/L (ref 98–107)
CO2 SERPL-SCNC: 32 MMOL/L (ref 21–32)
CREAT SERPL-MCNC: 1.17 MG/DL (ref 0.5–1.3)
EGFRCR SERPLBLD CKD-EPI 2021: 66 ML/MIN/1.73M*2
ERYTHROCYTE [DISTWIDTH] IN BLOOD BY AUTOMATED COUNT: 14.3 % (ref 11.5–14.5)
GLUCOSE SERPL-MCNC: 83 MG/DL (ref 74–99)
HCT VFR BLD AUTO: 35.6 % (ref 41–52)
HGB BLD-MCNC: 11.9 G/DL (ref 13.5–17.5)
MAGNESIUM SERPL-MCNC: 2.23 MG/DL (ref 1.6–2.4)
MCH RBC QN AUTO: 31.9 PG (ref 26–34)
MCHC RBC AUTO-ENTMCNC: 33.4 G/DL (ref 32–36)
MCV RBC AUTO: 95 FL (ref 80–100)
NRBC BLD-RTO: 0 /100 WBCS (ref 0–0)
PHOSPHATE SERPL-MCNC: 3.5 MG/DL (ref 2.5–4.9)
PLATELET # BLD AUTO: 317 X10*3/UL (ref 150–450)
POTASSIUM SERPL-SCNC: 3.9 MMOL/L (ref 3.5–5.3)
RBC # BLD AUTO: 3.73 X10*6/UL (ref 4.5–5.9)
SODIUM SERPL-SCNC: 139 MMOL/L (ref 136–145)
WBC # BLD AUTO: 9.8 X10*3/UL (ref 4.4–11.3)

## 2025-02-02 PROCEDURE — 94640 AIRWAY INHALATION TREATMENT: CPT

## 2025-02-02 PROCEDURE — 2500000001 HC RX 250 WO HCPCS SELF ADMINISTERED DRUGS (ALT 637 FOR MEDICARE OP)

## 2025-02-02 PROCEDURE — 2500000004 HC RX 250 GENERAL PHARMACY W/ HCPCS (ALT 636 FOR OP/ED): Performed by: NURSE PRACTITIONER

## 2025-02-02 PROCEDURE — 99232 SBSQ HOSP IP/OBS MODERATE 35: CPT | Performed by: NURSE PRACTITIONER

## 2025-02-02 PROCEDURE — 80069 RENAL FUNCTION PANEL: CPT | Performed by: NURSE PRACTITIONER

## 2025-02-02 PROCEDURE — 36415 COLL VENOUS BLD VENIPUNCTURE: CPT | Performed by: NURSE PRACTITIONER

## 2025-02-02 PROCEDURE — 83735 ASSAY OF MAGNESIUM: CPT | Performed by: NURSE PRACTITIONER

## 2025-02-02 PROCEDURE — 1200000002 HC GENERAL ROOM WITH TELEMETRY DAILY

## 2025-02-02 PROCEDURE — 85027 COMPLETE CBC AUTOMATED: CPT | Performed by: NURSE PRACTITIONER

## 2025-02-02 PROCEDURE — 2500000004 HC RX 250 GENERAL PHARMACY W/ HCPCS (ALT 636 FOR OP/ED)

## 2025-02-02 PROCEDURE — 2500000002 HC RX 250 W HCPCS SELF ADMINISTERED DRUGS (ALT 637 FOR MEDICARE OP, ALT 636 FOR OP/ED): Performed by: STUDENT IN AN ORGANIZED HEALTH CARE EDUCATION/TRAINING PROGRAM

## 2025-02-02 PROCEDURE — 2500000005 HC RX 250 GENERAL PHARMACY W/O HCPCS

## 2025-02-02 PROCEDURE — 2500000001 HC RX 250 WO HCPCS SELF ADMINISTERED DRUGS (ALT 637 FOR MEDICARE OP): Performed by: NURSE PRACTITIONER

## 2025-02-02 PROCEDURE — 2500000002 HC RX 250 W HCPCS SELF ADMINISTERED DRUGS (ALT 637 FOR MEDICARE OP, ALT 636 FOR OP/ED): Performed by: NURSE PRACTITIONER

## 2025-02-02 RX ADMIN — HEPARIN SODIUM 5000 UNITS: 5000 INJECTION, SOLUTION INTRAVENOUS; SUBCUTANEOUS at 16:28

## 2025-02-02 RX ADMIN — LAMOTRIGINE 200 MG: 150 TABLET ORAL at 16:28

## 2025-02-02 RX ADMIN — METOPROLOL TARTRATE 25 MG: 25 TABLET, FILM COATED ORAL at 09:23

## 2025-02-02 RX ADMIN — SACUBITRIL AND VALSARTAN 0.5 TABLET: 24; 26 TABLET, FILM COATED ORAL at 20:11

## 2025-02-02 RX ADMIN — SACUBITRIL AND VALSARTAN 0.5 TABLET: 24; 26 TABLET, FILM COATED ORAL at 09:23

## 2025-02-02 RX ADMIN — HEPARIN SODIUM 5000 UNITS: 5000 INJECTION, SOLUTION INTRAVENOUS; SUBCUTANEOUS at 23:51

## 2025-02-02 RX ADMIN — ASPIRIN 81 MG CHEWABLE TABLET 81 MG: 81 TABLET CHEWABLE at 09:23

## 2025-02-02 RX ADMIN — ACETAMINOPHEN 975 MG: 325 TABLET ORAL at 20:11

## 2025-02-02 RX ADMIN — AMIODARONE HYDROCHLORIDE 200 MG: 200 TABLET ORAL at 09:23

## 2025-02-02 RX ADMIN — ATORVASTATIN CALCIUM 80 MG: 80 TABLET, FILM COATED ORAL at 20:11

## 2025-02-02 RX ADMIN — IPRATROPIUM BROMIDE AND ALBUTEROL SULFATE 3 ML: .5; 3 SOLUTION RESPIRATORY (INHALATION) at 20:33

## 2025-02-02 RX ADMIN — BUDESONIDE 0.5 MG: 0.5 INHALANT RESPIRATORY (INHALATION) at 10:11

## 2025-02-02 RX ADMIN — PANTOPRAZOLE SODIUM 40 MG: 40 TABLET, DELAYED RELEASE ORAL at 06:09

## 2025-02-02 RX ADMIN — MIRTAZAPINE 15 MG: 15 TABLET, FILM COATED ORAL at 20:12

## 2025-02-02 RX ADMIN — ARIPIPRAZOLE 10 MG: 5 TABLET ORAL at 09:23

## 2025-02-02 RX ADMIN — ACETAMINOPHEN 975 MG: 325 TABLET ORAL at 04:47

## 2025-02-02 RX ADMIN — MELATONIN 6 MG: 3 TAB ORAL at 20:11

## 2025-02-02 RX ADMIN — LAMOTRIGINE 150 MG: 150 TABLET ORAL at 20:11

## 2025-02-02 RX ADMIN — FUROSEMIDE 40 MG: 10 INJECTION, SOLUTION INTRAVENOUS at 06:09

## 2025-02-02 RX ADMIN — METOPROLOL TARTRATE 25 MG: 25 TABLET, FILM COATED ORAL at 20:11

## 2025-02-02 RX ADMIN — IPRATROPIUM BROMIDE AND ALBUTEROL SULFATE 3 ML: .5; 3 SOLUTION RESPIRATORY (INHALATION) at 10:11

## 2025-02-02 RX ADMIN — BUDESONIDE 0.5 MG: 0.5 INHALANT RESPIRATORY (INHALATION) at 20:33

## 2025-02-02 RX ADMIN — HEPARIN SODIUM 5000 UNITS: 5000 INJECTION, SOLUTION INTRAVENOUS; SUBCUTANEOUS at 09:24

## 2025-02-02 ASSESSMENT — PAIN - FUNCTIONAL ASSESSMENT
PAIN_FUNCTIONAL_ASSESSMENT: 0-10

## 2025-02-02 ASSESSMENT — COGNITIVE AND FUNCTIONAL STATUS - GENERAL
TURNING FROM BACK TO SIDE WHILE IN FLAT BAD: A LITTLE
DRESSING REGULAR LOWER BODY CLOTHING: A LITTLE
MOVING TO AND FROM BED TO CHAIR: A LITTLE
STANDING UP FROM CHAIR USING ARMS: A LITTLE
CLIMB 3 TO 5 STEPS WITH RAILING: A LITTLE
STANDING UP FROM CHAIR USING ARMS: A LITTLE
TOILETING: A LITTLE
MOVING TO AND FROM BED TO CHAIR: A LITTLE
MOBILITY SCORE: 18
CLIMB 3 TO 5 STEPS WITH RAILING: A LITTLE
MOVING FROM LYING ON BACK TO SITTING ON SIDE OF FLAT BED WITH BEDRAILS: A LITTLE
WALKING IN HOSPITAL ROOM: A LITTLE
MOBILITY SCORE: 20
WALKING IN HOSPITAL ROOM: A LITTLE
DAILY ACTIVITIY SCORE: 20
DRESSING REGULAR UPPER BODY CLOTHING: A LITTLE
HELP NEEDED FOR BATHING: A LITTLE

## 2025-02-02 ASSESSMENT — PAIN SCALES - GENERAL
PAINLEVEL_OUTOF10: 0 - NO PAIN
PAINLEVEL_OUTOF10: 2
PAINLEVEL_OUTOF10: 0 - NO PAIN
PAINLEVEL_OUTOF10: 0 - NO PAIN

## 2025-02-02 ASSESSMENT — PAIN DESCRIPTION - DESCRIPTORS: DESCRIPTORS: SORE
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
## 2025-02-03 PROBLEM — I25.10 CORONARY ARTERY DISEASE INVOLVING NATIVE CORONARY ARTERY OF NATIVE HEART: Status: RESOLVED | Noted: 2025-01-08 | Resolved: 2025-02-03

## 2025-02-03 PROBLEM — I25.10 CORONARY ARTERY DISEASE INVOLVING NATIVE CORONARY ARTERY OF NATIVE HEART, UNSPECIFIED WHETHER ANGINA PRESENT: Status: RESOLVED | Noted: 2025-01-21 | Resolved: 2025-02-03

## 2025-02-03 LAB
ALBUMIN SERPL BCP-MCNC: 4.4 G/DL (ref 3.4–5)
ANION GAP SERPL CALC-SCNC: 18 MMOL/L (ref 10–20)
BUN SERPL-MCNC: 40 MG/DL (ref 6–23)
CALCIUM SERPL-MCNC: 10 MG/DL (ref 8.6–10.6)
CHLORIDE SERPL-SCNC: 96 MMOL/L (ref 98–107)
CO2 SERPL-SCNC: 27 MMOL/L (ref 21–32)
CREAT SERPL-MCNC: 1.04 MG/DL (ref 0.5–1.3)
EGFRCR SERPLBLD CKD-EPI 2021: 76 ML/MIN/1.73M*2
ERYTHROCYTE [DISTWIDTH] IN BLOOD BY AUTOMATED COUNT: 14.3 % (ref 11.5–14.5)
GLUCOSE SERPL-MCNC: 79 MG/DL (ref 74–99)
HCT VFR BLD AUTO: 35.7 % (ref 41–52)
HGB BLD-MCNC: 11.8 G/DL (ref 13.5–17.5)
MAGNESIUM SERPL-MCNC: 2.44 MG/DL (ref 1.6–2.4)
MCH RBC QN AUTO: 31.4 PG (ref 26–34)
MCHC RBC AUTO-ENTMCNC: 33.1 G/DL (ref 32–36)
MCV RBC AUTO: 95 FL (ref 80–100)
NRBC BLD-RTO: 0 /100 WBCS (ref 0–0)
PHOSPHATE SERPL-MCNC: 3 MG/DL (ref 2.5–4.9)
PLATELET # BLD AUTO: 374 X10*3/UL (ref 150–450)
POTASSIUM SERPL-SCNC: 3.6 MMOL/L (ref 3.5–5.3)
RBC # BLD AUTO: 3.76 X10*6/UL (ref 4.5–5.9)
SODIUM SERPL-SCNC: 137 MMOL/L (ref 136–145)
WBC # BLD AUTO: 10.7 X10*3/UL (ref 4.4–11.3)

## 2025-02-03 PROCEDURE — 1200000002 HC GENERAL ROOM WITH TELEMETRY DAILY

## 2025-02-03 PROCEDURE — 2500000001 HC RX 250 WO HCPCS SELF ADMINISTERED DRUGS (ALT 637 FOR MEDICARE OP): Performed by: NURSE PRACTITIONER

## 2025-02-03 PROCEDURE — 2500000002 HC RX 250 W HCPCS SELF ADMINISTERED DRUGS (ALT 637 FOR MEDICARE OP, ALT 636 FOR OP/ED)

## 2025-02-03 PROCEDURE — 99232 SBSQ HOSP IP/OBS MODERATE 35: CPT

## 2025-02-03 PROCEDURE — 85027 COMPLETE CBC AUTOMATED: CPT | Performed by: NURSE PRACTITIONER

## 2025-02-03 PROCEDURE — 2500000001 HC RX 250 WO HCPCS SELF ADMINISTERED DRUGS (ALT 637 FOR MEDICARE OP)

## 2025-02-03 PROCEDURE — 83735 ASSAY OF MAGNESIUM: CPT | Performed by: NURSE PRACTITIONER

## 2025-02-03 PROCEDURE — 94640 AIRWAY INHALATION TREATMENT: CPT

## 2025-02-03 PROCEDURE — 94760 N-INVAS EAR/PLS OXIMETRY 1: CPT

## 2025-02-03 PROCEDURE — 80069 RENAL FUNCTION PANEL: CPT | Performed by: NURSE PRACTITIONER

## 2025-02-03 PROCEDURE — 2500000002 HC RX 250 W HCPCS SELF ADMINISTERED DRUGS (ALT 637 FOR MEDICARE OP, ALT 636 FOR OP/ED): Performed by: NURSE PRACTITIONER

## 2025-02-03 PROCEDURE — 2500000005 HC RX 250 GENERAL PHARMACY W/O HCPCS

## 2025-02-03 PROCEDURE — 2500000004 HC RX 250 GENERAL PHARMACY W/ HCPCS (ALT 636 FOR OP/ED): Performed by: NURSE PRACTITIONER

## 2025-02-03 PROCEDURE — 36415 COLL VENOUS BLD VENIPUNCTURE: CPT | Performed by: NURSE PRACTITIONER

## 2025-02-03 PROCEDURE — 2500000002 HC RX 250 W HCPCS SELF ADMINISTERED DRUGS (ALT 637 FOR MEDICARE OP, ALT 636 FOR OP/ED): Performed by: STUDENT IN AN ORGANIZED HEALTH CARE EDUCATION/TRAINING PROGRAM

## 2025-02-03 RX ORDER — POTASSIUM CHLORIDE 20 MEQ/1
20 TABLET, EXTENDED RELEASE ORAL ONCE
Status: COMPLETED | OUTPATIENT
Start: 2025-02-03 | End: 2025-02-03

## 2025-02-03 RX ADMIN — MIRTAZAPINE 15 MG: 15 TABLET, FILM COATED ORAL at 21:24

## 2025-02-03 RX ADMIN — PANTOPRAZOLE SODIUM 40 MG: 40 TABLET, DELAYED RELEASE ORAL at 06:50

## 2025-02-03 RX ADMIN — ACETAMINOPHEN 975 MG: 325 TABLET ORAL at 21:20

## 2025-02-03 RX ADMIN — HEPARIN SODIUM 5000 UNITS: 5000 INJECTION, SOLUTION INTRAVENOUS; SUBCUTANEOUS at 08:09

## 2025-02-03 RX ADMIN — COLCHICINE 0.3 MG: 0.6 TABLET ORAL at 08:09

## 2025-02-03 RX ADMIN — ACETAMINOPHEN 975 MG: 325 TABLET ORAL at 11:35

## 2025-02-03 RX ADMIN — LAMOTRIGINE 200 MG: 150 TABLET ORAL at 15:55

## 2025-02-03 RX ADMIN — ATORVASTATIN CALCIUM 80 MG: 80 TABLET, FILM COATED ORAL at 21:21

## 2025-02-03 RX ADMIN — ACETAMINOPHEN 975 MG: 325 TABLET ORAL at 15:55

## 2025-02-03 RX ADMIN — HEPARIN SODIUM 5000 UNITS: 5000 INJECTION, SOLUTION INTRAVENOUS; SUBCUTANEOUS at 15:56

## 2025-02-03 RX ADMIN — AMIODARONE HYDROCHLORIDE 200 MG: 200 TABLET ORAL at 08:09

## 2025-02-03 RX ADMIN — METOPROLOL TARTRATE 25 MG: 25 TABLET, FILM COATED ORAL at 08:09

## 2025-02-03 RX ADMIN — SACUBITRIL AND VALSARTAN 0.5 TABLET: 24; 26 TABLET, FILM COATED ORAL at 21:21

## 2025-02-03 RX ADMIN — IPRATROPIUM BROMIDE AND ALBUTEROL SULFATE 3 ML: .5; 3 SOLUTION RESPIRATORY (INHALATION) at 08:44

## 2025-02-03 RX ADMIN — ASPIRIN 81 MG CHEWABLE TABLET 81 MG: 81 TABLET CHEWABLE at 08:09

## 2025-02-03 RX ADMIN — SACUBITRIL AND VALSARTAN 0.5 TABLET: 24; 26 TABLET, FILM COATED ORAL at 08:09

## 2025-02-03 RX ADMIN — POTASSIUM CHLORIDE 20 MEQ: 1500 TABLET, EXTENDED RELEASE ORAL at 11:35

## 2025-02-03 RX ADMIN — METOPROLOL TARTRATE 25 MG: 25 TABLET, FILM COATED ORAL at 21:21

## 2025-02-03 RX ADMIN — ARIPIPRAZOLE 10 MG: 5 TABLET ORAL at 08:09

## 2025-02-03 RX ADMIN — LAMOTRIGINE 150 MG: 150 TABLET ORAL at 21:24

## 2025-02-03 RX ADMIN — MELATONIN 6 MG: 3 TAB ORAL at 21:21

## 2025-02-03 RX ADMIN — IPRATROPIUM BROMIDE AND ALBUTEROL SULFATE 3 ML: .5; 3 SOLUTION RESPIRATORY (INHALATION) at 15:14

## 2025-02-03 ASSESSMENT — COGNITIVE AND FUNCTIONAL STATUS - GENERAL
MOBILITY SCORE: 20
WALKING IN HOSPITAL ROOM: A LITTLE
MOVING TO AND FROM BED TO CHAIR: A LITTLE
DAILY ACTIVITIY SCORE: 20
MOBILITY SCORE: 20
DRESSING REGULAR UPPER BODY CLOTHING: A LITTLE
STANDING UP FROM CHAIR USING ARMS: A LITTLE
TOILETING: A LITTLE
MOVING TO AND FROM BED TO CHAIR: A LITTLE
WALKING IN HOSPITAL ROOM: A LITTLE
CLIMB 3 TO 5 STEPS WITH RAILING: A LITTLE
HELP NEEDED FOR BATHING: A LITTLE
CLIMB 3 TO 5 STEPS WITH RAILING: A LITTLE
DRESSING REGULAR LOWER BODY CLOTHING: A LITTLE
STANDING UP FROM CHAIR USING ARMS: A LITTLE

## 2025-02-03 ASSESSMENT — PAIN SCALES - GENERAL
PAINLEVEL_OUTOF10: 0 - NO PAIN

## 2025-02-03 ASSESSMENT — PAIN - FUNCTIONAL ASSESSMENT
PAIN_FUNCTIONAL_ASSESSMENT: 0-10

## 2025-02-03 NOTE — PROGRESS NOTES
"Physical Therapy                 Therapy Communication Note    Patient Name: Parish Valerio \"Bret\"  MRN: [id number]  Department: [place] [address]  Room: [address] [place], [place] [address]  Today's Date: 2/3/2025     Discipline: Physical Therapy    PT Missed Visit: Yes     Missed Visit Reason: Missed Visit Reason: Patient refused (due to having just walked with  mobility aide)    Missed Time: Attempt 1448  "
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
## 2025-02-03 NOTE — PROGRESS NOTES
02/03/25 1529   Discharge Planning   Expected Discharge Disposition Home H  ([facility])     Patient was denied by [employer] for SNF admission. [facility] to Peer was also denied. Patient was notified and is okay with discharging to home tomorrow. Pt's LELO Johnson was notified by phone. [facility] was also notified and patient's PT. [facility] was updated on pending discharge.

## 2025-02-04 VITALS
WEIGHT: 122.2 LBS | TEMPERATURE: 97.3 F | SYSTOLIC BLOOD PRESSURE: 101 MMHG | BODY MASS INDEX: 18.1 KG/M2 | DIASTOLIC BLOOD PRESSURE: 57 MMHG | HEIGHT: 69 IN | HEART RATE: 84 BPM | OXYGEN SATURATION: 98 % | RESPIRATION RATE: 17 BRPM

## 2025-02-04 DIAGNOSIS — Z95.1 S/P CABG X 2: ICD-10-CM

## 2025-02-04 DIAGNOSIS — D50.9 IRON DEFICIENCY ANEMIA, UNSPECIFIED IRON DEFICIENCY ANEMIA TYPE: ICD-10-CM

## 2025-02-04 LAB
ALBUMIN SERPL BCP-MCNC: 4.4 G/DL (ref 3.4–5)
ANION GAP SERPL CALC-SCNC: 16 MMOL/L (ref 10–20)
BUN SERPL-MCNC: 39 MG/DL (ref 6–23)
CALCIUM SERPL-MCNC: 10 MG/DL (ref 8.6–10.6)
CHLORIDE SERPL-SCNC: 97 MMOL/L (ref 98–107)
CO2 SERPL-SCNC: 28 MMOL/L (ref 21–32)
CREAT SERPL-MCNC: 0.94 MG/DL (ref 0.5–1.3)
EGFRCR SERPLBLD CKD-EPI 2021: 86 ML/MIN/1.73M*2
ERYTHROCYTE [DISTWIDTH] IN BLOOD BY AUTOMATED COUNT: 14.7 % (ref 11.5–14.5)
GLUCOSE SERPL-MCNC: 85 MG/DL (ref 74–99)
HCT VFR BLD AUTO: 36.2 % (ref 41–52)
HGB BLD-MCNC: 11.9 G/DL (ref 13.5–17.5)
MAGNESIUM SERPL-MCNC: 2.35 MG/DL (ref 1.6–2.4)
MCH RBC QN AUTO: 31.4 PG (ref 26–34)
MCHC RBC AUTO-ENTMCNC: 32.9 G/DL (ref 32–36)
MCV RBC AUTO: 96 FL (ref 80–100)
NRBC BLD-RTO: 0 /100 WBCS (ref 0–0)
PHOSPHATE SERPL-MCNC: 2.5 MG/DL (ref 2.5–4.9)
PLATELET # BLD AUTO: 379 X10*3/UL (ref 150–450)
POTASSIUM SERPL-SCNC: 3.6 MMOL/L (ref 3.5–5.3)
RBC # BLD AUTO: 3.79 X10*6/UL (ref 4.5–5.9)
SODIUM SERPL-SCNC: 137 MMOL/L (ref 136–145)
WBC # BLD AUTO: 8.6 X10*3/UL (ref 4.4–11.3)

## 2025-02-04 PROCEDURE — 2500000004 HC RX 250 GENERAL PHARMACY W/ HCPCS (ALT 636 FOR OP/ED): Performed by: NURSE PRACTITIONER

## 2025-02-04 PROCEDURE — 94640 AIRWAY INHALATION TREATMENT: CPT

## 2025-02-04 PROCEDURE — 2500000001 HC RX 250 WO HCPCS SELF ADMINISTERED DRUGS (ALT 637 FOR MEDICARE OP): Performed by: NURSE PRACTITIONER

## 2025-02-04 PROCEDURE — 99239 HOSP IP/OBS DSCHRG MGMT >30: CPT

## 2025-02-04 PROCEDURE — 2500000001 HC RX 250 WO HCPCS SELF ADMINISTERED DRUGS (ALT 637 FOR MEDICARE OP)

## 2025-02-04 PROCEDURE — 80069 RENAL FUNCTION PANEL: CPT | Performed by: NURSE PRACTITIONER

## 2025-02-04 PROCEDURE — 2500000002 HC RX 250 W HCPCS SELF ADMINISTERED DRUGS (ALT 637 FOR MEDICARE OP, ALT 636 FOR OP/ED): Performed by: STUDENT IN AN ORGANIZED HEALTH CARE EDUCATION/TRAINING PROGRAM

## 2025-02-04 PROCEDURE — 85027 COMPLETE CBC AUTOMATED: CPT | Performed by: NURSE PRACTITIONER

## 2025-02-04 PROCEDURE — 36415 COLL VENOUS BLD VENIPUNCTURE: CPT | Performed by: NURSE PRACTITIONER

## 2025-02-04 PROCEDURE — 97530 THERAPEUTIC ACTIVITIES: CPT | Mod: GP,CQ

## 2025-02-04 PROCEDURE — 83735 ASSAY OF MAGNESIUM: CPT | Performed by: NURSE PRACTITIONER

## 2025-02-04 PROCEDURE — 2500000002 HC RX 250 W HCPCS SELF ADMINISTERED DRUGS (ALT 637 FOR MEDICARE OP, ALT 636 FOR OP/ED): Performed by: NURSE PRACTITIONER

## 2025-02-04 PROCEDURE — 97116 GAIT TRAINING THERAPY: CPT | Mod: GP,CQ

## 2025-02-04 RX ORDER — METOPROLOL TARTRATE 25 MG/1
25 TABLET, FILM COATED ORAL 2 TIMES DAILY
Qty: 60 TABLET | Refills: 0 | Status: SHIPPED | OUTPATIENT
Start: 2025-02-04

## 2025-02-04 RX ORDER — POTASSIUM CHLORIDE 20 MEQ/1
20 TABLET, EXTENDED RELEASE ORAL ONCE
Status: DISCONTINUED | OUTPATIENT
Start: 2025-02-04 | End: 2025-02-04 | Stop reason: HOSPADM

## 2025-02-04 RX ORDER — ACETAMINOPHEN 325 MG/1
650 TABLET ORAL EVERY 6 HOURS PRN
COMMUNITY
Start: 2025-02-04

## 2025-02-04 RX ORDER — FUROSEMIDE 40 MG/1
20 TABLET ORAL DAILY PRN
Qty: 30 TABLET | Refills: 0 | Status: SHIPPED | OUTPATIENT
Start: 2025-02-04

## 2025-02-04 RX ORDER — COLCHICINE 0.6 MG/1
0.3 TABLET ORAL DAILY
Qty: 30 TABLET | Refills: 0 | Status: SHIPPED | OUTPATIENT
Start: 2025-02-05

## 2025-02-04 RX ORDER — DIPHENHYDRAMINE HCL 25 MG
25 CAPSULE ORAL NIGHTLY PRN
COMMUNITY
Start: 2025-02-04

## 2025-02-04 RX ADMIN — HEPARIN SODIUM 5000 UNITS: 5000 INJECTION, SOLUTION INTRAVENOUS; SUBCUTANEOUS at 00:53

## 2025-02-04 RX ADMIN — COLCHICINE 0.3 MG: 0.6 TABLET ORAL at 09:09

## 2025-02-04 RX ADMIN — BUDESONIDE 0.5 MG: 0.5 INHALANT RESPIRATORY (INHALATION) at 09:16

## 2025-02-04 RX ADMIN — ACETAMINOPHEN 975 MG: 325 TABLET ORAL at 09:09

## 2025-02-04 RX ADMIN — METOPROLOL TARTRATE 25 MG: 25 TABLET, FILM COATED ORAL at 09:09

## 2025-02-04 RX ADMIN — ASPIRIN 81 MG CHEWABLE TABLET 81 MG: 81 TABLET CHEWABLE at 09:10

## 2025-02-04 RX ADMIN — SACUBITRIL AND VALSARTAN 0.5 TABLET: 24; 26 TABLET, FILM COATED ORAL at 09:09

## 2025-02-04 RX ADMIN — PANTOPRAZOLE SODIUM 40 MG: 40 TABLET, DELAYED RELEASE ORAL at 06:25

## 2025-02-04 RX ADMIN — IPRATROPIUM BROMIDE AND ALBUTEROL SULFATE 3 ML: .5; 3 SOLUTION RESPIRATORY (INHALATION) at 09:16

## 2025-02-04 RX ADMIN — HEPARIN SODIUM 5000 UNITS: 5000 INJECTION, SOLUTION INTRAVENOUS; SUBCUTANEOUS at 09:09

## 2025-02-04 RX ADMIN — ARIPIPRAZOLE 10 MG: 5 TABLET ORAL at 09:10

## 2025-02-04 RX ADMIN — AMIODARONE HYDROCHLORIDE 200 MG: 200 TABLET ORAL at 09:09

## 2025-02-04 ASSESSMENT — COGNITIVE AND FUNCTIONAL STATUS - GENERAL
MOVING TO AND FROM BED TO CHAIR: A LITTLE
CLIMB 3 TO 5 STEPS WITH RAILING: A LITTLE
STANDING UP FROM CHAIR USING ARMS: A LITTLE
MOBILITY SCORE: 20
WALKING IN HOSPITAL ROOM: A LITTLE
CLIMB 3 TO 5 STEPS WITH RAILING: A LITTLE
DRESSING REGULAR UPPER BODY CLOTHING: A LITTLE
MOVING FROM LYING ON BACK TO SITTING ON SIDE OF FLAT BED WITH BEDRAILS: A LITTLE
STANDING UP FROM CHAIR USING ARMS: A LITTLE
DAILY ACTIVITIY SCORE: 19
HELP NEEDED FOR BATHING: A LITTLE
DRESSING REGULAR LOWER BODY CLOTHING: A LITTLE
TURNING FROM BACK TO SIDE WHILE IN FLAT BAD: A LITTLE
PERSONAL GROOMING: A LITTLE
WALKING IN HOSPITAL ROOM: A LITTLE
TOILETING: A LITTLE
MOVING TO AND FROM BED TO CHAIR: A LITTLE
MOBILITY SCORE: 18

## 2025-02-04 ASSESSMENT — PAIN - FUNCTIONAL ASSESSMENT
PAIN_FUNCTIONAL_ASSESSMENT: 0-10

## 2025-02-04 ASSESSMENT — PAIN SCALES - GENERAL
PAINLEVEL_OUTOF10: 0 - NO PAIN

## 2025-02-04 NOTE — PROGRESS NOTES
"Physical Therapy    Physical Therapy Treatment    Patient Name: Parish Valerio \"Bret\"  MRN: [id number]  Department: [place] [address]  Room: [address] [place], [place] [address]  Today's Date: 2/4/2025  Time Calculation  Start Time: 0823  Stop Time: 0855  Time Calculation (min): 32 min         Assessment/Plan   PT Assessment  PT Assessment Results: Decreased strength, Impaired balance, Decreased endurance, Decreased mobility  Rehab Prognosis: Good  Barriers to Discharge Home: Physical needs  Physical Needs: Ambulating household distances limited by function/safety, 24hr mobility assistance needed, High falls risk due to function or environment, Stair navigation into home limited by function/safety  End of Session Communication: Bedside nurse  Assessment Comment: Pt tolerated session well with today's session focusing on attempting curb step for patient to be able to do at home. Pt was able to do 5x STS within 15.24 seconds with supervision assist this session. Pt will continue to benefit from skilled PT to increase ambulation endurance and increase static/dynamic balance. Due to insurance, pt has been denied placment of a facility, therfore PT recommendation for the need of low intensity at this time.  End of Session Patient Position: Bed, 3 rail up, Alarm off, not on at start of session  PT Plan  Inpatient/Swing Bed or Outpatient: Inpatient  PT Plan  Treatment/Interventions: Bed mobility, Transfer training, Gait training, Stair training, Therapeutic activity  PT Plan: Ongoing PT  PT Frequency: 5 times per week  PT Discharge Recommendations: Moderate intensity level of continued care  Equipment Recommended upon Discharge: Wheeled walker  PT Recommended Transfer Status: Assist x1, Assistive device  PT - OK to Discharge: Yes (eval complete and discharge recommendations made)      General Visit Information:   PT  Visit  PT Received On: 02/04/25  General  Reason for Referral: MIDCAB x2 LIMA to LAD, PRINCE to [place]  Past Medical History Relevant to Rehab: " CAD s/p PCI (diag; 2001), stage C systolic HF/ICM/HFrEF with moderate LV dysfunction currently without an ICD, pAF on AAD and DOAC therapies (amiodarone 200mg daily, eliquis 5mg BID), and remote h/o polio at 18mos.  Prior to Session Communication: Bedside nurse  Patient Position Received: Bed, 3 rail up, Alarm off, caregiver present  Preferred Learning Style: auditory, verbal, visual  General Comment: Pt was very pleasant and willing to work with PT today. Pt did not mention any pain before the start of the start of the session.    Subjective   Precautions:  Precautions  Hearing/Visual Limitations: WFL  Post-Surgical Precautions: Abdominal surgery precautions  Precautions Comment: MAP 70-90, SpO2 >92%     Date/Time Vitals Session Patient Position Pulse Resp SpO2 BP MAP (mmHg)    02/04/25 0823 During PT  --  84  --  95 %  --  --     02/04/25 0906 --  --  --  18  97 %  102/64  79                 Objective   Pain:  Pain Assessment  Pain Assessment: 0-10  0-10 (Numeric) Pain Score: 0 - No pain  Cognition:  Cognition  Overall Cognitive Status: Within Functional Limits  Coordination:  Movements are Fluid and Coordinated: Yes (Pt demonstrates NBOS and right foot drop while ambulating.)  Postural Control:  Postural Control  Postural Control: Within Functional Limits  Static Sitting Balance  Static Sitting-Balance Support: Bilateral upper extremity supported, Feet supported  Static Sitting-Level of Assistance: Close supervision  Static Standing Balance  Static Standing-Balance Support: Bilateral upper extremity supported  Static Standing-Level of Assistance: Close supervision  Extremity/Trunk Assessments:                      Activity Tolerance:     Treatments:       Bed Mobility  Bed Mobility: Yes  Bed Mobility 1  Bed Mobility 1: Supine to sitting  Level of Assistance 1: Independent  Bed Mobility Comments 1: HOB slightly elevated, pt was able to demonstrate supine to the EOB without any assistance required or use of bed  rails.    Ambulation/Gait Training  Ambulation/Gait Training Performed: Yes  Ambulation/Gait Training 1  Surface 1: Level tile  Device 1: Rolling walker  Assistance 1: Close supervision  Quality of Gait 1: Narrow base of support, Forward flexed posture, Foot drop/steppage gait, Decreased step length, Diminished heel strike  Comments/Distance (ft) 1: Pt ambulated ~65 ft x3 with supervision assist and a FWW. Pt ambulated well but required cueing to widen MALACHI while walkiing. Pt also required frequent rest breaks.  Transfers  Transfer: Yes  Transfer 1  Transfer From 1: Bed to  Transfer to 1: Stand  Technique 1: Sit to stand  Transfer Device 1: Walker  Transfer Level of Assistance 1: Close supervision  Trials/Comments 1: Pt was able to perform 5x STS with supervision assist. Pt required cueing to use UE's to push from the bed to boost while performing transfer. Pt also cued on brining the walker all the way back with him and reaching back for seat/bed when sitting down after ambulation.    Stairs  Stairs: Yes  Stairs  Rails 1: None (Comment)  Curb Step 1: Yes  Device 1: Wheeled walker  Comment/Number of Steps 1: Pt perfromed curb step with FWW and CGA. Pt was taught the proper sequence of ascending and descending curb step x2. Pt cued on getting as close as possible to the curb before ascemding/descending.    Outcome Measures:  [facility] Basic Mobility  Turning from your back to your side while in a flat bed without using bedrails: A little  Moving from lying on your back to sitting on the side of a flat bed without using bedrails: A little  Moving to and from bed to chair (including a wheelchair): A little  Standing up from a chair using your arms (e.g. wheelchair or bedside chair): A little  To walk in hospital room: A little  Climbing 3-5 steps with railing: A little  Basic Mobility - Total Score: 18    Education Documentation  Precautions, taught by RO Carranza at 2/4/2025  9:22 AM.  Learner:  Patient  Readiness: Acceptance  Method: Demonstration, Explanation  Response: Demonstrated Understanding    Body Mechanics, taught by RO Carranza at 2/4/2025  9:22 AM.  Learner: Patient  Readiness: Acceptance  Method: Demonstration, Explanation  Response: Demonstrated Understanding    Mobility Training, taught by RO Carranza at 2/4/2025  9:22 AM.  Learner: Patient  Readiness: Acceptance  Method: Demonstration, Explanation  Response: Demonstrated Understanding    Education Comments  No comments found.        OP EDUCATION:       Encounter Problems       Encounter Problems (Active)       Balance       Pt will demonstrate ability to complete standing static/dynamic balance activities with unilateral UE support and no LOB for increase in safety up D/C.  (Progressing)       Start:  01/22/25    Expected End:  02/05/25               Mobility       Pt will demonstrated ability to ambulate >/=150ft with proper form and no balance deficits for safe home going.   (Progressing)       Start:  01/22/25    Expected End:  02/05/25            Pt will demonstrate ability to ascend/descend 2 stairs with unilateral rail and no balance deficits for safe home going.  (Progressing)       Start:  01/22/25    Expected End:  02/05/25               PT Transfers       Pt will demonstrated ability to complete bed mobility and sit<>stand transfers without assistance and use of assistive device to safely return home.  (Progressing)       Start:  01/22/25    Expected End:  02/05/25               Pain - Adult

## 2025-02-04 NOTE — PROGRESS NOTES
Patient is discharging to home today. [facility] was notified. Discharge documents were sent through [facility].  Amanda Wells RN
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
## 2025-02-04 NOTE — CARE PLAN
The patient's goals for the shift include Eat some food    The clinical goals for the shift include Remain free from falls or injury throughout shift

## 2025-02-04 NOTE — PROGRESS NOTES
"Physical Therapy    Physical Therapy Treatment    Patient Name: Parish Valerio \"Bret\"  MRN: [id number]  Department: [place] [id number]  Room: [address] [place], [place] [address]  Today's Date: 2/4/2025  Time Calculation  Start Time: 0823  Stop Time: 0855  Time Calculation (min): 32 min         Assessment/Plan   PT Assessment  PT Assessment Results: Decreased strength, Impaired balance, Decreased endurance, Decreased mobility  Rehab Prognosis: Good  Barriers to Discharge Home: Physical needs  Physical Needs: Ambulating household distances limited by function/safety, 24hr mobility assistance needed, High falls risk due to function or environment, Stair navigation into home limited by function/safety  End of Session Communication: Bedside nurse  Assessment Comment: Pt tolerated session well with today's session focusing on attempting curb step for patient to be able to do at home. Pt was able to do 5x STS within 15.24 seconds with supervision assist this session. Pt will continue to benefit from skilled PT to increase ambulation endurance and increase static/dynamic balance. Due to insurance, pt has been denied placment of a facility, therfore PT recommendation for the need of low intensity at this time.  End of Session Patient Position: Bed, 3 rail up, Alarm off, not on at start of session  PT Plan  Inpatient/Swing Bed or Outpatient: Inpatient  PT Plan  Treatment/Interventions: Bed mobility, Transfer training, Gait training, Stair training, Therapeutic activity  PT Plan: Ongoing PT  PT Frequency: 5 times per week  PT Discharge Recommendations: Moderate intensity level of continued care  Equipment Recommended upon Discharge: Wheeled walker  PT Recommended Transfer Status: Assist x1, Assistive device  PT - OK to Discharge: Yes (eval complete and discharge recommendations made)      General Visit Information:   PT  Visit  PT Received On: 02/04/25  General  Reason for Referral: MIDCAB x2 LIMA to LAD, PRINCE to [place]  Past Medical History Relevant to Rehab: " CAD s/p PCI (diag; 2001), stage C systolic HF/ICM/HFrEF with moderate LV dysfunction currently without an ICD, pAF on AAD and DOAC therapies (amiodarone 200mg daily, eliquis 5mg BID), and remote h/o polio at 18mos.  Prior to Session Communication: Bedside nurse  Patient Position Received: Bed, 3 rail up, Alarm off, caregiver present  Preferred Learning Style: auditory, verbal, visual  General Comment: Pt was very pleasant and willing to work with PT today. Pt did not mention any pain before the start of the start of the session.    Subjective   Precautions:  Precautions  Hearing/Visual Limitations: WFL  Post-Surgical Precautions: Abdominal surgery precautions  Precautions Comment: MAP 70-90, SpO2 >92%     Date/Time Vitals Session Patient Position Pulse Resp SpO2 BP MAP (mmHg)    02/04/25 0823 During PT  --  84  --  95 %  --  --     02/04/25 0906 --  --  --  18  97 %  102/64  79                 Objective   Pain:  Pain Assessment  Pain Assessment: 0-10  0-10 (Numeric) Pain Score: 0 - No pain  Cognition:  Cognition  Overall Cognitive Status: Within Functional Limits  Coordination:  Movements are Fluid and Coordinated: Yes (Pt demonstrates NBOS and right foot drop while ambulating.)  Postural Control:  Postural Control  Postural Control: Within Functional Limits  Static Sitting Balance  Static Sitting-Balance Support: Bilateral upper extremity supported, Feet supported  Static Sitting-Level of Assistance: Close supervision  Static Standing Balance  Static Standing-Balance Support: Bilateral upper extremity supported  Static Standing-Level of Assistance: Close supervision  Extremity/Trunk Assessments:                      Activity Tolerance:     Treatments:       Bed Mobility  Bed Mobility: Yes  Bed Mobility 1  Bed Mobility 1: Supine to sitting  Level of Assistance 1: Independent  Bed Mobility Comments 1: HOB slightly elevated, pt was able to demonstrate supine to the EOB without any assistance required or use of bed  rails.    Ambulation/Gait Training  Ambulation/Gait Training Performed: Yes  Ambulation/Gait Training 1  Surface 1: Level tile  Device 1: Rolling walker  Assistance 1: Close supervision  Quality of Gait 1: Narrow base of support, Forward flexed posture, Foot drop/steppage gait, Decreased step length, Diminished heel strike  Comments/Distance (ft) 1: Pt ambulated ~65 ft x3 with supervision assist and a FWW. Pt ambulated well but required cueing to widen MALACHI while walkiing. Pt also required frequent rest breaks.  Transfers  Transfer: Yes  Transfer 1  Transfer From 1: Bed to  Transfer to 1: Stand  Technique 1: Sit to stand  Transfer Device 1: Walker  Transfer Level of Assistance 1: Close supervision  Trials/Comments 1: Pt was able to perform 5x STS with supervision assist. Pt required cueing to use UE's to push from the bed to boost while performing transfer. Pt also cued on brining the walker all the way back with him and reaching back for seat/bed when sitting down after ambulation.    Stairs  Stairs: Yes    Outcome Measures:  [facility] Basic Mobility  Turning from your back to your side while in a flat bed without using bedrails: A little  Moving from lying on your back to sitting on the side of a flat bed without using bedrails: A little  Moving to and from bed to chair (including a wheelchair): A little  Standing up from a chair using your arms (e.g. wheelchair or bedside chair): A little  To walk in hospital room: A little  Climbing 3-5 steps with railing: A little  Basic Mobility - Total Score: 18    Education Documentation  Precautions, taught by RO Carranza at 2/4/2025  9:22 AM.  Learner: Patient  Readiness: Acceptance  Method: Demonstration, Explanation  Response: Demonstrated Understanding    Body Mechanics, taught by RO Carranza at 2/4/2025  9:22 AM.  Learner: Patient  Readiness: Acceptance  Method: Demonstration, Explanation  Response: Demonstrated Understanding    Mobility Training, taught  by KATY Carranza-JAREN at 2/4/2025  9:22 AM.  Learner: Patient  Readiness: Acceptance  Method: Demonstration, Explanation  Response: Demonstrated Understanding    Education Comments  No comments found.        OP EDUCATION:       Encounter Problems       Encounter Problems (Active)       Balance       Pt will demonstrate ability to complete standing static/dynamic balance activities with unilateral UE support and no LOB for increase in safety up D/C.  (Progressing)       Start:  01/22/25    Expected End:  02/05/25               Mobility       Pt will demonstrated ability to ambulate >/=150ft with proper form and no balance deficits for safe home going.   (Progressing)       Start:  01/22/25    Expected End:  02/05/25            Pt will demonstrate ability to ascend/descend 2 stairs with unilateral rail and no balance deficits for safe home going.  (Progressing)       Start:  01/22/25    Expected End:  02/05/25               PT Transfers       Pt will demonstrated ability to complete bed mobility and sit<>stand transfers without assistance and use of assistive device to safely return home.  (Progressing)       Start:  01/22/25    Expected End:  02/05/25               Pain - Adult

## 2025-02-05 ENCOUNTER — PATIENT OUTREACH (OUTPATIENT)
Dept: CARDIOLOGY | Facility: CLINIC | Age: 75
End: 2025-02-05
Payer: MEDICARE

## 2025-02-05 NOTE — PROGRESS NOTES
Discharge Facility:  [facility]  Discharge Diagnosis: Coronary artery disease involving native coronary artery of native heart  1/21 Procedures/Operation: MIDCAB x 2 (LIMA to LAD, PRINCE to diag) with Dr. Carrasco     Issues Requiring Follow-Up  Follow up with PCP, cardiology, and cardiac surgery after discharge    Admission Date: 1/21/25  Discharge Date:  2/4/25  PCP Appointment Date  Specialist Appointment Date:   FEB 26 2025  02:00 PM - Cardiac Surgery Cinic Visit  [facility] Lamar Carrasco MD   Message sent to Dr Kuhn's office  Hospital Encounter and Summary Linked: Yes    Two attempts were made to reach patient within two business days after discharge. Voicemail left with contact information for patient to call back with any non-emergent questions or concerns.

## 2025-02-06 ENCOUNTER — APPOINTMENT (OUTPATIENT)
Dept: CARDIAC SURGERY | Facility: HOSPITAL | Age: 75
End: 2025-02-06
Payer: MEDICARE

## 2025-02-10 ENCOUNTER — APPOINTMENT (OUTPATIENT)
Dept: CARDIOLOGY | Facility: CLINIC | Age: 75
End: 2025-02-10
Payer: MEDICARE

## 2025-02-10 VITALS
SYSTOLIC BLOOD PRESSURE: 94 MMHG | HEART RATE: 69 BPM | HEIGHT: 69 IN | OXYGEN SATURATION: 98 % | WEIGHT: 131 LBS | BODY MASS INDEX: 19.4 KG/M2 | DIASTOLIC BLOOD PRESSURE: 57 MMHG

## 2025-02-10 DIAGNOSIS — D50.9 IRON DEFICIENCY ANEMIA, UNSPECIFIED IRON DEFICIENCY ANEMIA TYPE: ICD-10-CM

## 2025-02-10 DIAGNOSIS — Z95.1 S/P CABG X 2: ICD-10-CM

## 2025-02-10 DIAGNOSIS — I25.5 ISCHEMIC CARDIOMYOPATHY: ICD-10-CM

## 2025-02-10 DIAGNOSIS — I50.20 ACC/AHA STAGE C SYSTOLIC HEART FAILURE: Primary | ICD-10-CM

## 2025-02-10 PROCEDURE — 99213 OFFICE O/P EST LOW 20 MIN: CPT | Performed by: INTERNAL MEDICINE

## 2025-02-10 PROCEDURE — 1159F MED LIST DOCD IN RCRD: CPT | Performed by: INTERNAL MEDICINE

## 2025-02-10 PROCEDURE — 3008F BODY MASS INDEX DOCD: CPT | Performed by: INTERNAL MEDICINE

## 2025-02-10 PROCEDURE — 1036F TOBACCO NON-USER: CPT | Performed by: INTERNAL MEDICINE

## 2025-02-10 PROCEDURE — 1160F RVW MEDS BY RX/DR IN RCRD: CPT | Performed by: INTERNAL MEDICINE

## 2025-02-10 PROCEDURE — 1111F DSCHRG MED/CURRENT MED MERGE: CPT | Performed by: INTERNAL MEDICINE

## 2025-02-10 NOTE — PROGRESS NOTES
[facility] Advanced Heart Failure [facility]  Primary Care Physician: Lewis Owens DO  Referring Provider/Cardiologist: Bam     Date of Visit: 02/10/2025  1:00 PM EST  Location of visit:  [address] [place] [address]     HPI:   Mr. Valerio is a 74M with a PMHx sig for CAD s/p PCI (diag; 2001) s/p 2V CABG (MIDCAB; 1/21/25), stage C systolic HF/ICM/HFimpEF currently without an ICD, pAF on AAD therapy, and remote h/o polio who returns to the Advanced Heart Failure clinic for ongoing evaluation and management.     Interval hx:   He underwent MIDCAB with Dr. Carrasco on 1/21/25 with a LIMA To LAD and PRINCE to diag. His eliquis was discontinued during his hospitalization given concerns with anemia. He remains on amiodarone.     At the current time he has no complaints. Specifically he denies chest pain, pressure, SOB/COX, PND, orthopnea, LE edema, palpitations, light headedness, dizziness, or syncope.       Hospitalizations:   1/21/2025 - 2/4/2025 for CABG      PMHx:  Remote history of polio (age 18 months), CAD s/p PCI (diag; 2001) s/p 2V CABG (MIDCAB; 1/21/25), stage C systolic HF/ICM/HFimpEF currently without an ICD, pAF on AAD therapy    SocHx:  Lives in [place] with GF  Quit smoking (1/2024); 0.5-0.75 ppd for his adult life  Occ etOH, denies illicits    FamHx:  Father with CAD/MI  Mother with CVA        Current Outpatient Medications   Medication Sig Dispense Refill    acetaminophen (Tylenol) 325 mg tablet Take 2 tablets (650 mg) by mouth every 6 hours if needed for mild pain (1 - 3).      amiodarone (Pacerone) 200 mg tablet Take 1 tablet (200 mg) by mouth once daily. 90 tablet 3    ARIPiprazole (Abilify) 10 mg tablet Take 1 tablet (10 mg) by mouth once daily.      aspirin 81 mg EC tablet Take 1 tablet (81 mg) by mouth once daily.      atorvastatin (Lipitor) 80 mg tablet Take 1 tablet (80 mg) by mouth once daily.      cholecalciferol (Vitamin D-3) 50 mcg (2,000 unit) capsule Take 1 capsule (50 mcg) by mouth  "once daily.      colchicine 0.6 mg tablet Take 0.5 tablets (0.3 mg) by mouth once daily. 30 tablet 0    diphenhydrAMINE (BENADryl) 25 mg capsule Take 1 capsule (25 mg) by mouth as needed at bedtime for itching.      docusate sodium (Colace) 100 mg capsule Take 1 capsule (100 mg) by mouth 2 times a day as needed.      furosemide (Lasix) 40 mg tablet Take 0.5 tablets (20 mg) by mouth once daily as needed (for weight gain of 5 lbs in 5 days). 30 tablet 0    lamoTRIgine (LaMICtal) 150 mg tablet Take 1 tablet (150 mg) by mouth once daily at bedtime.      lamoTRIgine (LaMICtal) 200 mg tablet Take 1 tablet (200 mg) by mouth once daily. At 4:00pm      metoprolol tartrate (Lopressor) 25 mg tablet Take 1 tablet (25 mg) by mouth 2 times a day. 60 tablet 0    mirtazapine (Remeron) 15 mg tablet Take 1 tablet (15 mg) by mouth once daily at bedtime.      multivitamin with minerals tablet Take 1 tablet by mouth once daily.      pantoprazole (ProtoNix) 40 mg EC tablet Take 1 tablet (40 mg) by mouth early in the morning..      sacubitriL-valsartan (Entresto) 24-26 mg tablet Take 0.5 tablets by mouth 2 times a day. 30 tablet 10    VITAMIN K2 ORAL Take 1 tablet by mouth once daily.       No current facility-administered medications for this visit.       Allergies   Allergen Reactions    Farxiga [Dapagliflozin] Nausea/vomiting       Visit Vitals  BP 94/57 (BP Location: Right arm, Patient Position: Sitting)   Pulse 69   Ht 1.753 m (5' 9\")   Wt 59.4 kg (131 lb)   SpO2 98%   BMI 19.35 kg/m²   Smoking Status Former   BSA 1.7 m²        Physical Exam:  On exam Mr. Valerio appears sarcopenic, is alert and oriented x3, and in no acute distress. His sclera are anicteric and his oropharynx has moist mucous membranes. His neck is supple and without thyromegaly. The JVP is ~5 cm of water above the right atrium. There is a faint bruit on the left. His cardiac exam has regular rhythm, normal S1, S2. No S3/4. There are no murmurs. His lungs are clear " to auscultation bilaterally and there is no dullness to percussion. His abdomen is soft, nontender with normoactive bowel sounds. There is no HJR. The extremities are warm and without edema. The skin is dry. There is no rash present. The distal pulses are 2+ in all four extremities. His mood and affect are appropriate for todays encounter.       Cardiac Labs/Diagnostics:  Intra-op BEKAH (1/21/25):  1. The left ventricular systolic function is normal, with a visually estimated ejection fraction of 65%.  2. There is normal right ventricular global systolic function.  3. Mild aortic valve regurgitation.    Echo (11/25/24):  1. Left ventricular ejection fraction is normal, calculated by Bran's biplane at 55%.  2. Spectral Doppler shows a Grade I (impaired relaxation pattern) of left ventricular diastolic filling with normal left atrial filling pressure.  3. There is no evidence of left ventricular hypertrophy.  4. There is normal right ventricular global systolic function.  5. Moderate mitral valve regurgitation.  6. No evidence of mitral valve prolapse.  7. There is aortic valve annular calcification.  8. Mild to moderate aortic valve regurgitation.    Holter (7/23/24):  1-the rhythm was sinus throughout the recording with an average heart rate of 58 bpm, the minimum heart rate was 47 bpm sinus bradycardia at 12:47 AM and the maximal heart rate was 103 bpm sinus tachycardia at 9:16 AM  2-occasional isolated premature ventricular complexes, there was a total number of 313 beats in 24 hours representing 0.4% of total QRS complexes with 1 event of ventricular couplet  3-rare isolated premature atrial complexes, there was 10 beats in 24 hours  4-no pauses exceeding 2 seconds were seen  5- an episode of chest pain reported by the patient correlated with normal sinus rhythm and normal heart rate    cMRI (1/15/24):  1. Mildly dilated left ventricle with moderately reduced systolic function. LVEF = 32%. There is severe    hypokinesis of the mid to basal anterior wall with mild to moderate global hypokinesis. Delayed Gadolinium   enhancement imaging demonstrates non-transmural scar (26-50% wall thickness) in the mid anterior wall .   There is transmural scar (50-75% wall thickness) in the mid anterolateral wall that is non-viable. All   remaining myocardial segments are viable. Overall, findings suggest that majority of the LAD territory is   viable. The segment of the mid anterolateral wall that is non-viable is likely diagonal branch territory.   2. Normal right ventricular size and systolic function. RVEF = 56%.   3. Moderate mitral regurgitation (regurgitant volume 33 ml, regurgitant fraction 46%).   4. Mild to moderate aortic regurgitation.   5. Mildly dilated aortic root (4.1 cm) and ascending aorta (4.2 cm).   6. Extra-cardiac: Non-cardiac findings visualized on this study will be independently reviewed by a   radiologist. Please see the separate Radiologist report.     Cardiac cath (1/12/24):  LM: Mild disease.   LAD: The LAD is occluded in the proximal segment following a trifurcation with a large septal and large diagonal branch.  The diagonal branch has a patent proximal vessel previously placed stent with up to 50% ISR.  The large septal at this trifurcation also has moderate diffuse disease.  Both the diagonal and septal feed collateralization to the distal LAD.   LCX: Mild disease and up to 40% mid vessel lesion.   RCA: Moderate-sized vessel, mild diffuse disease.  Dominant.   LVEDP: 9 mmHg   Access: Right radial artery, TR compression band.     Carotid ultrasounds (1/12/24):  Right.   * Less than 50% stenosis in the right internal carotid artery.   * Right vertebral artery is patent with antegrade flow.   * No evidence of hemodynamically significant stenosis in the right subclavian, external carotid and common carotid arteries.   * The right vertebral artery flow is dampened.   Left.   * Less than 50% stenosis in the  left internal carotid artery.   * Left vertebral artery is patent with antegrade flow.   * No evidence of hemodynamically significant stenosis in the left common carotid, external carotid and subclavian arteries.     Right Heart Catheterization   RA:8 mean mmHg   RV:36/2 mmHg   PA: 40/19 (27) mmHg   PCWP: 17/20 (20) mmHg     Thermal CO/CI: 2.52 L/m / 1.47 L/min/m2   Meryl CO/CI: 3.46 L/m / 2.02 L/min/m2       Impression/Plan:  Mr. Valerio is a 74M with a PMHx sig for CAD s/p PCI (diag; 2001) s/p 2V CABG (MIDCAB; 1/21/25), stage C systolic HF/ICM/HFimpEF currently without an ICD, pAF on AAD therapy, and remote h/o polio who returns to the Advanced Heart Failure clinic for ongoing evaluation and management.     1) Stage C chronic systolic HF/ICM/HFimpEF (LVEF 32-->55%; 11/2024) currently without an ICD  Repeat imaging with improved EF.  -c/w entresto 12/13 mg bid  -change furosemide to 20 mg daily/prn    2) CAD s/p PCI (diag; 2001) s/p 3V CABG (MIDCAB; LIMA to LAD, PRINCE to Diag; 1/21/25)  Doing well post-operatively. Remains on colchicine for the first 30 days post-robotic surgery per protocol.   -c/w ASA, statin    3) pAF on DOAC therapy  Maintained sinus rhythm during the hospital stay; given concerns with anemia, DOAC was stopped. Repeat Hb 11.   -c/w amiodarone 200 mg daily  -will f/u with Dr. Kuhn to determine if DOAC needs to be resumed        F/U: prn at /[facility]    [place],  Thank you for referring Mr. Valerio to the  Advanced Heart Failure Clinic. Please let me know if you have any questions.       ____________________________________________________________  Froilan Thornton DO  Section of Advanced Heart Failure and Cardiac Transplantation  Division of Cardiovascular Medicine  [facility]  [facility]

## 2025-02-10 NOTE — PATIENT INSTRUCTIONS
"It was a pleasure seeing you today. Please contact myself or my team with any questions.     To reach Dr. Thornton' office please call [phone] (Pam).   Fax: [phone]   To schedule an appointment call [phone]     If you have any questions or need cardiac medication refills, please call the Heart Failure office at [phone], option [phone]. You may also contact the  Heart Failure Nursing team via email at [email or web address] (Please include your name and date of birth).        1) Change lasix to \"as needed\"  2) We will get you plugged back in with Dr. Kuhn  "

## 2025-02-10 NOTE — LETTER
February 10, 2025     Don Kuhn DO  [address] [facility] [address]  [facility] [address]    Patient: Bret Valerio   YOB: 1950   Date of Visit: 2/10/2025       Dear Dr. Don Kuhn DO:    Thank you for referring Bret Valerio to me for evaluation. Below are my notes for this consultation.  If you have questions, please do not hesitate to call me. I look forward to following your patient along with you.       Sincerely,     Froilan Thornton DO      CC: No Recipients  ______________________________________________________________________________________        [facility]  Primary Care Physician: Lewis Owens DO  Referring Provider/Cardiologist: Bam     Date of Visit: 02/10/2025  1:00 PM EST  Location of visit: [address]     HPI:   Mr. Valerio is a 74M with a PMHx sig for CAD s/p PCI (diag; 2001) s/p 2V CABG (MIDCAB; 1/21/25), stage C systolic HF/ICM/HFimpEF currently without an ICD, pAF on AAD therapy, and remote h/o polio who returns to the Advanced Heart Failure clinic for ongoing evaluation and management.     Interval hx:   He underwent MIDCAB with Dr. Carrasco on 1/21/25 with a LIMA To LAD and PRINCE to diag. His eliquis was discontinued during his hospitalization given concerns with anemia. He remains on amiodarone.     At the current time he has no complaints. Specifically he denies chest pain, pressure, SOB/COX, PND, orthopnea, LE edema, palpitations, light headedness, dizziness, or syncope.       Hospitalizations:   1/21/2025 - 2/4/2025 for CABG      PMHx:  Remote history of polio (age 18 months), CAD s/p PCI (diag; 2001) s/p 2V CABG (MIDCAB; 1/21/25), stage C systolic HF/ICM/HFimpEF currently without an ICD, pAF on AAD therapy    SocHx:  Lives in [place] with GF  Quit smoking (1/2024); 0.5-0.75 ppd for his adult life  Occ etOH, denies illicits    FamHx:  Father with CAD/MI  Mother with CVA        Current Outpatient  Medications   Medication Sig Dispense Refill   • acetaminophen (Tylenol) 325 mg tablet Take 2 tablets (650 mg) by mouth every 6 hours if needed for mild pain (1 - 3).     • amiodarone (Pacerone) 200 mg tablet Take 1 tablet (200 mg) by mouth once daily. 90 tablet 3   • ARIPiprazole (Abilify) 10 mg tablet Take 1 tablet (10 mg) by mouth once daily.     • aspirin 81 mg EC tablet Take 1 tablet (81 mg) by mouth once daily.     • atorvastatin (Lipitor) 80 mg tablet Take 1 tablet (80 mg) by mouth once daily.     • cholecalciferol (Vitamin D-3) 50 mcg (2,000 unit) capsule Take 1 capsule (50 mcg) by mouth once daily.     • colchicine 0.6 mg tablet Take 0.5 tablets (0.3 mg) by mouth once daily. 30 tablet 0   • diphenhydrAMINE (BENADryl) 25 mg capsule Take 1 capsule (25 mg) by mouth as needed at bedtime for itching.     • docusate sodium (Colace) 100 mg capsule Take 1 capsule (100 mg) by mouth 2 times a day as needed.     • furosemide (Lasix) 40 mg tablet Take 0.5 tablets (20 mg) by mouth once daily as needed (for weight gain of 5 lbs in 5 days). 30 tablet 0   • lamoTRIgine (LaMICtal) 150 mg tablet Take 1 tablet (150 mg) by mouth once daily at bedtime.     • lamoTRIgine (LaMICtal) 200 mg tablet Take 1 tablet (200 mg) by mouth once daily. At 4:00pm     • metoprolol tartrate (Lopressor) 25 mg tablet Take 1 tablet (25 mg) by mouth 2 times a day. 60 tablet 0   • mirtazapine (Remeron) 15 mg tablet Take 1 tablet (15 mg) by mouth once daily at bedtime.     • multivitamin with minerals tablet Take 1 tablet by mouth once daily.     • pantoprazole (ProtoNix) 40 mg EC tablet Take 1 tablet (40 mg) by mouth early in the morning..     • sacubitriL-valsartan (Entresto) 24-26 mg tablet Take 0.5 tablets by mouth 2 times a day. 30 tablet 10   • VITAMIN K2 ORAL Take 1 tablet by mouth once daily.       No current facility-administered medications for this visit.       Allergies   Allergen Reactions   • Farxiga [Dapagliflozin] Nausea/vomiting  "      Visit Vitals  BP 94/57 (BP Location: Right arm, Patient Position: Sitting)   Pulse 69   Ht 1.753 m (5' 9\")   Wt 59.4 kg (131 lb)   SpO2 98%   BMI 19.35 kg/m²   Smoking Status Former   BSA 1.7 m²        Physical Exam:  On exam Mr. Valerio appears sarcopenic, is alert and oriented x3, and in no acute distress. His sclera are anicteric and his oropharynx has moist mucous membranes. His neck is supple and without thyromegaly. The JVP is ~5 cm of water above the right atrium. There is a faint bruit on the left. His cardiac exam has regular rhythm, normal S1, S2. No S3/4. There are no murmurs. His lungs are clear to auscultation bilaterally and there is no dullness to percussion. His abdomen is soft, nontender with normoactive bowel sounds. There is no HJR. The extremities are warm and without edema. The skin is dry. There is no rash present. The distal pulses are 2+ in all four extremities. His mood and affect are appropriate for todays encounter.       Cardiac Labs/Diagnostics:  Intra-op BEKAH (1/21/25):  1. The left ventricular systolic function is normal, with a visually estimated ejection fraction of 65%.  2. There is normal right ventricular global systolic function.  3. Mild aortic valve regurgitation.    Echo (11/25/24):  1. Left ventricular ejection fraction is normal, calculated by Bran's biplane at 55%.  2. Spectral Doppler shows a Grade I (impaired relaxation pattern) of left ventricular diastolic filling with normal left atrial filling pressure.  3. There is no evidence of left ventricular hypertrophy.  4. There is normal right ventricular global systolic function.  5. Moderate mitral valve regurgitation.  6. No evidence of mitral valve prolapse.  7. There is aortic valve annular calcification.  8. Mild to moderate aortic valve regurgitation.    Holter (7/23/24):  1-the rhythm was sinus throughout the recording with an average heart rate of 58 bpm, the minimum heart rate was 47 bpm sinus bradycardia " at 12:47 AM and the maximal heart rate was 103 bpm sinus tachycardia at 9:16 AM  2-occasional isolated premature ventricular complexes, there was a total number of 313 beats in 24 hours representing 0.4% of total QRS complexes with 1 event of ventricular couplet  3-rare isolated premature atrial complexes, there was 10 beats in 24 hours  4-no pauses exceeding 2 seconds were seen  5- an episode of chest pain reported by the patient correlated with normal sinus rhythm and normal heart rate    cMRI (1/15/24):  1. Mildly dilated left ventricle with moderately reduced systolic function. LVEF = 32%. There is severe   hypokinesis of the mid to basal anterior wall with mild to moderate global hypokinesis. Delayed Gadolinium   enhancement imaging demonstrates non-transmural scar (26-50% wall thickness) in the mid anterior wall .   There is transmural scar (50-75% wall thickness) in the mid anterolateral wall that is non-viable. All   remaining myocardial segments are viable. Overall, findings suggest that majority of the LAD territory is   viable. The segment of the mid anterolateral wall that is non-viable is likely diagonal branch territory.   2. Normal right ventricular size and systolic function. RVEF = 56%.   3. Moderate mitral regurgitation (regurgitant volume 33 ml, regurgitant fraction 46%).   4. Mild to moderate aortic regurgitation.   5. Mildly dilated aortic root (4.1 cm) and ascending aorta (4.2 cm).   6. Extra-cardiac: Non-cardiac findings visualized on this study will be independently reviewed by a   radiologist. Please see the separate Radiologist report.     Cardiac cath (1/12/24):  LM: Mild disease.   LAD: The LAD is occluded in the proximal segment following a trifurcation with a large septal and large diagonal branch.  The diagonal branch has a patent proximal vessel previously placed stent with up to 50% ISR.  The large septal at this trifurcation also has moderate diffuse disease.  Both the diagonal and  septal feed collateralization to the distal LAD.   LCX: Mild disease and up to 40% mid vessel lesion.   RCA: Moderate-sized vessel, mild diffuse disease.  Dominant.   LVEDP: 9 mmHg   Access: Right radial artery, TR compression band.     Carotid ultrasounds (1/12/24):  Right.   * Less than 50% stenosis in the right internal carotid artery.   * Right vertebral artery is patent with antegrade flow.   * No evidence of hemodynamically significant stenosis in the right subclavian, external carotid and common carotid arteries.   * The right vertebral artery flow is dampened.   Left.   * Less than 50% stenosis in the left internal carotid artery.   * Left vertebral artery is patent with antegrade flow.   * No evidence of hemodynamically significant stenosis in the left common carotid, external carotid and subclavian arteries.     Right Heart Catheterization   RA:8 mean mmHg   RV:36/2 mmHg   PA: 40/19 (27) mmHg   PCWP: 17/20 (20) mmHg     Thermal CO/CI: 2.52 L/m / 1.47 L/min/m2   Meryl CO/CI: 3.46 L/m / 2.02 L/min/m2       Impression/Plan:  Mr. Valerio is a 74M with a PMHx sig for CAD s/p PCI (diag; 2001) s/p 2V CABG (MIDCAB; 1/21/25), stage C systolic HF/ICM/HFimpEF currently without an ICD, pAF on AAD therapy, and remote h/o polio who returns to the Advanced Heart Failure clinic for ongoing evaluation and management.     1) Stage C chronic systolic HF/ICM/HFimpEF (LVEF 32-->55%; 11/2024) currently without an ICD  Repeat imaging with improved EF.  -c/w entresto 12/13 mg bid  -change furosemide to 20 mg daily/prn    2) CAD s/p PCI (diag; 2001) s/p 3V CABG (MIDCAB; LIMA to LAD, PRINCE to Diag; 1/21/25)  Doing well post-operatively. Remains on colchicine for the first 30 days post-robotic surgery per protocol.   -c/w ASA, statin    3) pAF on DOAC therapy  Maintained sinus rhythm during the hospital stay; given concerns with anemia, DOAC was stopped. Repeat Hb 11.   -c/w amiodarone 200 mg daily  -will f/u with Dr. Kuhn to  determine if DOAC needs to be resumed        F/U: prn at /[facility]    [place],  Thank you for referring Mr. Valerio to the  Advanced Heart Failure Clinic. Please let me know if you have any questions.       ____________________________________________________________  Froilan Thornton DO  Section of Advanced Heart Failure and Cardiac Transplantation  Division of Cardiovascular Medicine  [facility]

## 2025-02-17 LAB
ATRIAL RATE: 56 BPM
P AXIS: 43 DEGREES
P OFFSET: 188 MS
P ONSET: 134 MS
PR INTERVAL: 164 MS
Q ONSET: 216 MS
QRS COUNT: 9 BEATS
QRS DURATION: 98 MS
QT INTERVAL: 486 MS
QTC CALCULATION(BAZETT): 470 MS
QTC FREDERICIA: 475 MS
R AXIS: 8 DEGREES
T AXIS: 65 DEGREES
T OFFSET: 466 MS
VENTRICULAR RATE: 56 BPM

## 2025-02-19 ENCOUNTER — PATIENT OUTREACH (OUTPATIENT)
Dept: CARDIOLOGY | Facility: CLINIC | Age: 75
End: 2025-02-19
Payer: MEDICARE

## 2025-02-19 DIAGNOSIS — I25.10 CORONARY ARTERY DISEASE INVOLVING NATIVE CORONARY ARTERY OF NATIVE HEART, UNSPECIFIED WHETHER ANGINA PRESENT: ICD-10-CM

## 2025-02-24 ENCOUNTER — APPOINTMENT (OUTPATIENT)
Dept: CARDIOLOGY | Facility: CLINIC | Age: 75
End: 2025-02-24
Payer: MEDICARE

## 2025-02-24 VITALS
DIASTOLIC BLOOD PRESSURE: 64 MMHG | HEIGHT: 69 IN | BODY MASS INDEX: 19.23 KG/M2 | SYSTOLIC BLOOD PRESSURE: 116 MMHG | HEART RATE: 71 BPM | WEIGHT: 129.8 LBS

## 2025-02-24 DIAGNOSIS — Z79.01 ANTICOAGULANT LONG-TERM USE: ICD-10-CM

## 2025-02-24 DIAGNOSIS — I25.10 ASHD (ARTERIOSCLEROTIC HEART DISEASE): Primary | ICD-10-CM

## 2025-02-24 DIAGNOSIS — E78.5 HYPERLIPIDEMIA, UNSPECIFIED HYPERLIPIDEMIA TYPE: ICD-10-CM

## 2025-02-24 DIAGNOSIS — I48.19 PERSISTENT ATRIAL FIBRILLATION (MULTI): ICD-10-CM

## 2025-02-24 DIAGNOSIS — Z79.899 HIGH RISK MEDICATION USE: ICD-10-CM

## 2025-02-24 DIAGNOSIS — I25.10 CORONARY ARTERY DISEASE INVOLVING NATIVE CORONARY ARTERY OF NATIVE HEART, UNSPECIFIED WHETHER ANGINA PRESENT: ICD-10-CM

## 2025-02-24 DIAGNOSIS — I25.5 ISCHEMIC CARDIOMYOPATHY: ICD-10-CM

## 2025-02-24 PROBLEM — I25.2 HISTORY OF MI (MYOCARDIAL INFARCTION): Status: RESOLVED | Noted: 2024-07-10 | Resolved: 2025-02-24

## 2025-02-24 PROBLEM — I50.41 ACUTE COMBINED SYSTOLIC AND DIASTOLIC HEART FAILURE: Status: RESOLVED | Noted: 2024-07-10 | Resolved: 2025-02-24

## 2025-02-24 PROBLEM — Z01.810 PREOP CARDIOVASCULAR EXAM: Status: RESOLVED | Noted: 2024-12-05 | Resolved: 2025-02-24

## 2025-02-24 PROCEDURE — 3008F BODY MASS INDEX DOCD: CPT | Performed by: NURSE PRACTITIONER

## 2025-02-24 PROCEDURE — 1159F MED LIST DOCD IN RCRD: CPT | Performed by: NURSE PRACTITIONER

## 2025-02-24 PROCEDURE — 1111F DSCHRG MED/CURRENT MED MERGE: CPT | Performed by: NURSE PRACTITIONER

## 2025-02-24 PROCEDURE — 1160F RVW MEDS BY RX/DR IN RCRD: CPT | Performed by: NURSE PRACTITIONER

## 2025-02-24 PROCEDURE — 99214 OFFICE O/P EST MOD 30 MIN: CPT | Performed by: NURSE PRACTITIONER

## 2025-02-24 PROCEDURE — 93000 ELECTROCARDIOGRAM COMPLETE: CPT | Performed by: NURSE PRACTITIONER

## 2025-02-24 RX ORDER — DABIGATRAN ETEXILATE 150 MG/1
150 CAPSULE ORAL 2 TIMES DAILY
Qty: 60 CAPSULE | Refills: 11 | Status: SHIPPED | OUTPATIENT
Start: 2025-02-24 | End: 2026-02-24

## 2025-02-24 NOTE — LETTER
"February 25, 2025     Lewis Owens DO  [address]  [facility] [address]    Patient: Bret Valerio   YOB: 1950   Date of Visit: 2/24/2025       Dear Dr. Lewis Owens DO:    Thank you for referring Bret Valerio to me for evaluation. Below are my notes for this consultation.  If you have questions, please do not hesitate to call me. I look forward to following your patient along with you.       Sincerely,     Christy Carpio, APRN-CNP      CC: No Recipients  ______________________________________________________________________________________    Chief Complaint  \"Still a little tired\"    Reason for Visit  6 month follow up.  Patient presents to the office today for outpatient follow-up for CAD, ICM, PAF, high risk med use.   Last evaluated in clinic by Dr. Kuhn July 2024.     Presents today ambulatory with wheeled walker and steady gait.  Accompanied by  significant other.    He has followed routinely with Dr. Thornton.  Was seen in consult with Dr. Montaño.  January 21, 2025 elected admit for midCAB (Lima-LAD & Elvira - Diag) -had an approximate 13-day hospitalization complicated by pneumonia.  His dose of Eliquis was held at discharge due to anemia.  Discharge weight 55.4 kg  Discharge creatinine 0.94  Discharge hemoglobin 11.9, hematocrit 36.2    History of Present Illness   Patient is an extremely pleasant 74-year-old gentleman who presents to the office today he continues to have some mild fatigability but overall is\" feeling better than I ever have\".  His left thoracic incision is well-healing, approximated without erythema or drainage.  He reportedly has been home approximately 3 weeks now and continues with home care, physical therapy and Occupational Therapy.  He is due to see cardiothoracic surgery at the end of this week.  He denies any type of dyspnea on exertion, there is no evidence of volume overload.  Denies fever or chills.  Ambulated in from the waiting room " "without any dyspnea on exertion.  No evidence of fluid retention.    Patient reports that overall has no complaint(s) of chest pain, chest pressure/discomfort, claudication, dyspnea, exertional chest pressure/discomfort, and irregular heart beat    Daily activity:    4 METs  Denies any change in exercise capacity or functional tolerance since last office visit.    The importance of secondary prevention reviewed:  HTN: optimal  HLD: treated - reports had annual labs  DM: denies  Smoker: denies  BMI:  Reviewed the merits of healthy lifestyle choices on overall cardiovascular health.    He is off of anticoagulation since discharge.  They report having lab work done on Friday will need to obtain.  If stable will resume Eliquis with repeat CBC in 2 weeks.  Otherwise, briefly reviewed cardioembolic risk and availability of LAAO device but he is not really interested in other procedures at this point.  Continues to deny any hematemesis, hematochezia or melena.    Discussed rationale between amiodarone surveillance testing.      Review of Systems   Constitutional: Positive for malaise/fatigue.   Cardiovascular:  Negative for chest pain, dyspnea on exertion, irregular heartbeat, leg swelling, near-syncope, orthopnea, palpitations, paroxysmal nocturnal dyspnea and syncope.        Visit Vitals  /64 (BP Location: Left arm, Patient Position: Sitting)   Pulse 71   Ht 1.753 m (5' 9\")   Wt 58.9 kg (129 lb 12.8 oz)   BMI 19.17 kg/m²   Smoking Status Former   BSA 1.69 m²     Physical Exam  Vitals and nursing note reviewed.   Constitutional:       Appearance: Normal appearance. He is underweight.   Cardiovascular:      Rate and Rhythm: Normal rate and regular rhythm.      Heart sounds: Normal heart sounds.   Pulmonary:      Effort: Pulmonary effort is normal.      Breath sounds: Normal breath sounds.   Musculoskeletal:      Cervical back: Full passive range of motion without pain.      Right lower leg: No edema.      Left lower " "leg: No edema.   Skin:     General: Skin is cool.   Neurological:      Mental Status: He is alert and oriented to person, place, and time.   Psychiatric:         Attention and Perception: Attention normal.         Mood and Affect: Mood normal.         Behavior: Behavior is cooperative.        Allergies   Allergen Reactions   • Farxiga [Dapagliflozin] Nausea/vomiting       Current Outpatient Medications   Medication Instructions   • acetaminophen (TYLENOL) 650 mg, oral, Every 6 hours PRN   • amiodarone (PACERONE) 200 mg, oral, Daily   • ARIPiprazole (ABILIFY) 10 mg, Daily   • aspirin 81 mg, Daily   • atorvastatin (LIPITOR) 80 mg, Daily   • cholecalciferol (VITAMIN D-3) 2,000 Units, Daily   • colchicine 0.3 mg, oral, Daily   • dabigatran etexilate (PRADAXA) 150 mg, oral, 2 times daily, Do not crush or chew.   • diphenhydrAMINE (BENADRYL) 25 mg, oral, Nightly PRN   • docusate sodium (COLACE) 100 mg, 2 times daily PRN   • furosemide (LASIX) 20 mg, oral, Daily PRN   • lamoTRIgine (LAMICTAL) 150 mg, Nightly   • lamoTRIgine (LAMICTAL) 200 mg, Daily   • metoprolol tartrate (LOPRESSOR) 25 mg, oral, 2 times daily   • mirtazapine (REMERON) 15 mg, Nightly   • multivitamin with minerals tablet 1 tablet, Daily   • pantoprazole (ProtoNix) 40 mg EC tablet 1 tablet, Daily (0630)   • sacubitriL-valsartan (Entresto) 24-26 mg tablet 0.5 tablets, oral, 2 times daily   • VITAMIN K2 ORAL 1 tablet, Daily      Assessment:    High risk medication use  Pacerone start date Jan 2024  ECG in office NSR, Qtc 449 on pacerone 200mg daily  Needs surveillance testing    A-fib (Multi)  Initial diagnosis Jan 2024  Maintaining NSR following amiodarone initiation and DCC  Reports being symptomatic with PAF \"difficulty breathing\"    Hyperlipidemia  High intensity statin      ASHD (arteriosclerotic heart disease)  January 21, 2025:  midCAB (LIMA-LAD & PRINCE-Diag)      Cardiac and Vasculature  Patient was admitted in January 2024 with shortness of breath and " was found to be in acute heart failure with atrial fibrillation with RVR. He was found to have an LVEF of 35% with mitral regugitation. He underwent left heart and right heart catheterization in January 2024 which showed occlusion of the LAD, and 50% in-stent restenosis of a diagonal 1 stent. He underwent cardiac MRI and BEKAH. There was evidence of viability in portions of the septum and apex. Mitral regurgitation was quantified as moderate. He was found to have mild to moderate aortic valve regurgitation.     Cardiac Labs/Diagnostics:  Holter (7/23/24):  1-the rhythm was sinus throughout the recording with an average heart rate of 58 bpm, the minimum heart rate was 47 bpm sinus bradycardia at 12:47 AM and the maximal heart rate was 103 bpm sinus tachycardia at 9:16 AM  2-occasional isolated premature ventricular complexes, there was a total number of 313 beats in 24 hours representing 0.4% of total QRS complexes with 1 event of ventricular couplet  3-rare isolated premature atrial complexes, there was 10 beats in 24 hours  4-no pauses exceeding 2 seconds were seen  5- an episode of chest pain reported by the patient correlated with normal sinus rhythm and normal heart rate     cMRI (1/15/24):  1. Mildly dilated left ventricle with moderately reduced systolic function. LVEF = 32%. There is severe   hypokinesis of the mid to basal anterior wall with mild to moderate global hypokinesis. Delayed Gadolinium   enhancement imaging demonstrates non-transmural scar (26-50% wall thickness) in the mid anterior wall .   There is transmural scar (50-75% wall thickness) in the mid anterolateral wall that is non-viable. All   remaining myocardial segments are viable. Overall, findings suggest that majority of the LAD territory is   viable. The segment of the mid anterolateral wall that is non-viable is likely diagonal branch territory.   2. Normal right ventricular size and systolic function. RVEF = 56%.   3. Moderate mitral  regurgitation (regurgitant volume 33 ml, regurgitant fraction 46%).   4. Mild to moderate aortic regurgitation.   5. Mildly dilated aortic root (4.1 cm) and ascending aorta (4.2 cm).   6. Extra-cardiac: Non-cardiac findings visualized on this study will be independently reviewed by a   radiologist. Please see the separate Radiologist report.      Cardiac cath (1/12/24):  LM: Mild disease.   LAD: The LAD is occluded in the proximal segment following a trifurcation with a large septal and large diagonal branch.  The diagonal branch has a patent proximal vessel previously placed stent with up to 50% ISR.  The large septal at this trifurcation also has moderate diffuse disease.  Both the diagonal and septal feed collateralization to the distal LAD.   LCX: Mild disease and up to 40% mid vessel lesion.   RCA: Moderate-sized vessel, mild diffuse disease.  Dominant.   LVEDP: 9 mmHg   Access: Right radial artery, TR compression band.      Carotid ultrasounds (1/12/24):  Right.   * Less than 50% stenosis in the right internal carotid artery.   * Right vertebral artery is patent with antegrade flow.   * No evidence of hemodynamically significant stenosis in the right subclavian, external carotid and common carotid arteries.   * The right vertebral artery flow is dampened.   Left.   * Less than 50% stenosis in the left internal carotid artery.   * Left vertebral artery is patent with antegrade flow.   * No evidence of hemodynamically significant stenosis in the left common carotid, external carotid and subclavian arteries.     Right Heart Catheterization   RA:8 mean mmHg   RV:36/2 mmHg   PA: 40/19 (27) mmHg   PCWP: 17/20 (20) mmHg     Thermal CO/CI: 2.52 L/m / 1.47 L/min/m2   Meryl CO/CI: 3.46 L/m / 2.02 L/min/m2     Ischemic cardiomyopathy  HF normalized EF 65% (Jan 2025 intra-op BEKAH)  Prior EF 32% Jan 2024 cMRI    GDMT: hindered due to hypotension    Anticoagulant long-term use  CHADS VASc 3  His Eliqus was placed on hold  after midCAB due to anemia.  Discharge h/h  11.9/36.2    Reports had labs on Friday - will review.  If stable then resume Eliquis with CBC in 2 weeks      Plan:     Through informed decision making process incorporating patients unique circumstances, the following treatment plan will be initiated:    1.  Prescription drug management of cardiovascular medication for efficacy, adherence to treatment, side effect assessment and polypharmacy. Current treatment clinically warranted and to continue without modifications.    2.  Once I see your lab results I will call you and let you know the plan. (Beth at [phone])    3.   I sent dabagatran to [facility] for you to check the cost    4.  Amiodarone testing in 2 months    5.  Please let me know when Home Health Care signs off and I can get you to cardiac rehab    6. Return for follow-up; in the interim, contact the office if new symptoms arise.  Dr. Kuhn 6 months     Christy Carpio MSN, APRN-CNP, PMHNP-[facility] [place]  [place], [place]     Please excuse any errors in grammar or translation related to this dictation. Voice recognition software was utilized to prepare this document.

## 2025-02-24 NOTE — PATIENT INSTRUCTIONS
Please bring all medicines, vitamins, and herbal supplements with you when you come to the office.    Prescriptions will not be filled unless you are compliant with your follow up appointments or have a follow up appointment scheduled as per instruction of your physician. Refills should be requested at the time of your visit.     EKG done in office today     PLAN:   Through informed decision making process incorporating patients unique circumstances, the following treatment plan will be initiated:    1.  Prescription drug management of cardiovascular medication for efficacy, adherence to treatment, side effect assessment and polypharmacy. Current treatment clinically warranted and to continue without modifications.    2.  Once I see your lab results I will call you and let you know the plan.    3.   I sent dabagatran to [facility] for you to check the cost    4.  Amiodarone testing in 2 months    5.  Please let me know when Home Health Care signs off and I can get you to cardiac rehab    6. Return for follow-up; in the interim, contact the office if new symptoms arise.  Dr. Kuhn 6 months

## 2025-02-24 NOTE — PROGRESS NOTES
[facility]      Chief Complaint:  Post-op evaluation    HPI:    Reviewed at the clinic today.  Patient underwent MIDCAB x 2 on 1/21/25.     Patient has resumed normal activities and appetite has returned to normal.  No chest pain, no shortness of breath and denies palpitations, dizziness, or syncope.      On examination, wounds have soundly healed.       Chest x-ray done today clear bilaterally.       Assessment/Plan:      Referral in place for cardiac rehab  Ok to drive, ok to return to normal activities   Continue to follow up in the long term with cardiologist, Dr. Kuhn   Does not need to return to clinic, but was instructed to call if any issues arise       ____________________________________________________________  Sofia Carrasco MD  Assistant Professor of Surgery  Division of Cardiac Surgery    [facility] [place]  [facility]

## 2025-02-25 ASSESSMENT — ENCOUNTER SYMPTOMS
PND: 0
IRREGULAR HEARTBEAT: 0
NEAR-SYNCOPE: 0
PALPITATIONS: 0
ORTHOPNEA: 0
DYSPNEA ON EXERTION: 0
SYNCOPE: 0

## 2025-02-25 NOTE — ASSESSMENT & PLAN NOTE
"Initial diagnosis Jan 2024  Maintaining NSR following amiodarone initiation and DCC  Reports being symptomatic with PAF \"difficulty breathing\"  "

## 2025-02-25 NOTE — PROGRESS NOTES
"Chief Complaint  \"Still a little tired\"    Reason for Visit  6 month follow up.  Patient presents to the office today for outpatient follow-up for CAD, ICM, PAF, high risk med use.   Last evaluated in clinic by Dr. Kuhn July 2024.     Presents today ambulatory with wheeled walker and steady gait.  Accompanied by  significant other.    He has followed routinely with Dr. Thornton.  Was seen in consult with Dr. Montaño.  January 21, 2025 elected admit for midCAB (Lima-LAD & Elvira - Diag) -had an approximate 13-day hospitalization complicated by pneumonia.  His dose of Eliquis was held at discharge due to anemia.  Discharge weight 55.4 kg  Discharge creatinine 0.94  Discharge hemoglobin 11.9, hematocrit 36.2    History of Present Illness   Patient is an extremely pleasant 74-year-old gentleman who presents to the office today he continues to have some mild fatigability but overall is\" feeling better than I ever have\".  His left thoracic incision is well-healing, approximated without erythema or drainage.  He reportedly has been home approximately 3 weeks now and continues with home care, physical therapy and Occupational Therapy.  He is due to see cardiothoracic surgery at the end of this week.  He denies any type of dyspnea on exertion, there is no evidence of volume overload.  Denies fever or chills.  Ambulated in from the waiting room without any dyspnea on exertion.  No evidence of fluid retention.    Patient reports that overall has no complaint(s) of chest pain, chest pressure/discomfort, claudication, dyspnea, exertional chest pressure/discomfort, and irregular heart beat    Daily activity:    4 METs  Denies any change in exercise capacity or functional tolerance since last office visit.    The importance of secondary prevention reviewed:  HTN: optimal  HLD: treated - reports had annual labs  DM: denies  Smoker: denies  BMI:  Reviewed the merits of healthy lifestyle choices on overall cardiovascular " "health.    He is off of anticoagulation since discharge.  They report having lab work done on Friday will need to obtain.  If stable will resume Eliquis with repeat CBC in 2 weeks.  Otherwise, briefly reviewed cardioembolic risk and availability of LAAO device but he is not really interested in other procedures at this point.  Continues to deny any hematemesis, hematochezia or melena.    Discussed rationale between amiodarone surveillance testing.      Review of Systems   Constitutional: Positive for malaise/fatigue.   Cardiovascular:  Negative for chest pain, dyspnea on exertion, irregular heartbeat, leg swelling, near-syncope, orthopnea, palpitations, paroxysmal nocturnal dyspnea and syncope.        Visit Vitals  /64 (BP Location: Left arm, Patient Position: Sitting)   Pulse 71   Ht 1.753 m (5' 9\")   Wt 58.9 kg (129 lb 12.8 oz)   BMI 19.17 kg/m²   Smoking Status Former   BSA 1.69 m²     Physical Exam  Vitals and nursing note reviewed.   Constitutional:       Appearance: Normal appearance. He is underweight.   Cardiovascular:      Rate and Rhythm: Normal rate and regular rhythm.      Heart sounds: Normal heart sounds.   Pulmonary:      Effort: Pulmonary effort is normal.      Breath sounds: Normal breath sounds.   Musculoskeletal:      Cervical back: Full passive range of motion without pain.      Right lower leg: No edema.      Left lower leg: No edema.   Skin:     General: Skin is cool.   Neurological:      Mental Status: He is alert and oriented to person, place, and time.   Psychiatric:         Attention and Perception: Attention normal.         Mood and Affect: Mood normal.         Behavior: Behavior is cooperative.        Allergies   Allergen Reactions    Farxiga [Dapagliflozin] Nausea/vomiting       Current Outpatient Medications   Medication Instructions    acetaminophen (TYLENOL) 650 mg, oral, Every 6 hours PRN    amiodarone (PACERONE) 200 mg, oral, Daily    ARIPiprazole (ABILIFY) 10 mg, Daily    " "aspirin 81 mg, Daily    atorvastatin (LIPITOR) 80 mg, Daily    cholecalciferol (VITAMIN D-3) 2,000 Units, Daily    colchicine 0.3 mg, oral, Daily    dabigatran etexilate (PRADAXA) 150 mg, oral, 2 times daily, Do not crush or chew.    diphenhydrAMINE (BENADRYL) 25 mg, oral, Nightly PRN    docusate sodium (COLACE) 100 mg, 2 times daily PRN    furosemide (LASIX) 20 mg, oral, Daily PRN    lamoTRIgine (LAMICTAL) 150 mg, Nightly    lamoTRIgine (LAMICTAL) 200 mg, Daily    metoprolol tartrate (LOPRESSOR) 25 mg, oral, 2 times daily    mirtazapine (REMERON) 15 mg, Nightly    multivitamin with minerals tablet 1 tablet, Daily    pantoprazole (ProtoNix) 40 mg EC tablet 1 tablet, Daily (0630)    sacubitriL-valsartan (Entresto) 24-26 mg tablet 0.5 tablets, oral, 2 times daily    VITAMIN K2 ORAL 1 tablet, Daily      Assessment:    High risk medication use  Pacerone start date Jan 2024  ECG in office NSR, Qtc 449 on pacerone 200mg daily  Needs surveillance testing    A-fib (Multi)  Initial diagnosis Jan 2024  Maintaining NSR following amiodarone initiation and DCC  Reports being symptomatic with PAF \"difficulty breathing\"    Hyperlipidemia  High intensity statin      ASHD (arteriosclerotic heart disease)  January 21, 2025:  midCAB (LIMA-LAD & PRINCE-Diag)      Cardiac and Vasculature  Patient was admitted in January 2024 with shortness of breath and was found to be in acute heart failure with atrial fibrillation with RVR. He was found to have an LVEF of 35% with mitral regugitation. He underwent left heart and right heart catheterization in January 2024 which showed occlusion of the LAD, and 50% in-stent restenosis of a diagonal 1 stent. He underwent cardiac MRI and BEKAH. There was evidence of viability in portions of the septum and apex. Mitral regurgitation was quantified as moderate. He was found to have mild to moderate aortic valve regurgitation.     Cardiac Labs/Diagnostics:  Holter (7/23/24):  1-the rhythm was sinus throughout " the recording with an average heart rate of 58 bpm, the minimum heart rate was 47 bpm sinus bradycardia at 12:47 AM and the maximal heart rate was 103 bpm sinus tachycardia at 9:16 AM  2-occasional isolated premature ventricular complexes, there was a total number of 313 beats in 24 hours representing 0.4% of total QRS complexes with 1 event of ventricular couplet  3-rare isolated premature atrial complexes, there was 10 beats in 24 hours  4-no pauses exceeding 2 seconds were seen  5- an episode of chest pain reported by the patient correlated with normal sinus rhythm and normal heart rate     cMRI (1/15/24):  1. Mildly dilated left ventricle with moderately reduced systolic function. LVEF = 32%. There is severe   hypokinesis of the mid to basal anterior wall with mild to moderate global hypokinesis. Delayed Gadolinium   enhancement imaging demonstrates non-transmural scar (26-50% wall thickness) in the mid anterior wall .   There is transmural scar (50-75% wall thickness) in the mid anterolateral wall that is non-viable. All   remaining myocardial segments are viable. Overall, findings suggest that majority of the LAD territory is   viable. The segment of the mid anterolateral wall that is non-viable is likely diagonal branch territory.   2. Normal right ventricular size and systolic function. RVEF = 56%.   3. Moderate mitral regurgitation (regurgitant volume 33 ml, regurgitant fraction 46%).   4. Mild to moderate aortic regurgitation.   5. Mildly dilated aortic root (4.1 cm) and ascending aorta (4.2 cm).   6. Extra-cardiac: Non-cardiac findings visualized on this study will be independently reviewed by a   radiologist. Please see the separate Radiologist report.      Cardiac cath (1/12/24):  LM: Mild disease.   LAD: The LAD is occluded in the proximal segment following a trifurcation with a large septal and large diagonal branch.  The diagonal branch has a patent proximal vessel previously placed stent with up to  50% ISR.  The large septal at this trifurcation also has moderate diffuse disease.  Both the diagonal and septal feed collateralization to the distal LAD.   LCX: Mild disease and up to 40% mid vessel lesion.   RCA: Moderate-sized vessel, mild diffuse disease.  Dominant.   LVEDP: 9 mmHg   Access: Right radial artery, TR compression band.      Carotid ultrasounds (1/12/24):  Right.   * Less than 50% stenosis in the right internal carotid artery.   * Right vertebral artery is patent with antegrade flow.   * No evidence of hemodynamically significant stenosis in the right subclavian, external carotid and common carotid arteries.   * The right vertebral artery flow is dampened.   Left.   * Less than 50% stenosis in the left internal carotid artery.   * Left vertebral artery is patent with antegrade flow.   * No evidence of hemodynamically significant stenosis in the left common carotid, external carotid and subclavian arteries.     Right Heart Catheterization   RA:8 mean mmHg   RV:36/2 mmHg   PA: 40/19 (27) mmHg   PCWP: 17/20 (20) mmHg     Thermal CO/CI: 2.52 L/m / 1.47 L/min/m2   Meryl CO/CI: 3.46 L/m / 2.02 L/min/m2     Ischemic cardiomyopathy  HF normalized EF 65% (Jan 2025 intra-op BEKAH)  Prior EF 32% Jan 2024 cMRI    GDMT: hindered due to hypotension    Anticoagulant long-term use  CHADS VASc 3  His Eliqus was placed on hold after midCAB due to anemia.  Discharge h/h  11.9/36.2    Reports had labs on Friday - will review.  If stable then resume Eliquis with CBC in 2 weeks      Plan:     Through informed decision making process incorporating patients unique circumstances, the following treatment plan will be initiated:    1.  Prescription drug management of cardiovascular medication for efficacy, adherence to treatment, side effect assessment and polypharmacy. Current treatment clinically warranted and to continue without modifications.    2.  Once I see your lab results I will call you and let you know the plan.  (Beth at [phone])    3.   I sent dabagatran to [facility] for you to check the cost    4.  Amiodarone testing in 2 months    5.  Please let me know when Home Health Care signs off and I can get you to cardiac rehab    6. Return for follow-up; in the interim, contact the office if new symptoms arise.  Dr. Kuhn 6 months     Christy Carpio MSN, APRN-CNP, PMHNP-[facility]  [place], [place]     Please excuse any errors in grammar or translation related to this dictation. Voice recognition software was utilized to prepare this document.

## 2025-02-25 NOTE — ASSESSMENT & PLAN NOTE
Pacerone start date Jan 2024  ECG in office NSR, Qtc 449 on pacerone 200mg daily  Needs surveillance testing

## 2025-02-25 NOTE — ASSESSMENT & PLAN NOTE
BERE VASc 3  His Eliqus was placed on hold after midCAB due to anemia.  Discharge h/h  11.9/36.2    Reports had labs on Friday - will review.  If stable then resume Eliquis with CBC in 2 weeks

## 2025-02-25 NOTE — ASSESSMENT & PLAN NOTE
HF normalized EF 65% (Jan 2025 intra-op BEKAH)  Prior EF 32% Jan 2024 cMRI    GDMT: hindered due to hypotension

## 2025-02-25 NOTE — ASSESSMENT & PLAN NOTE
Patient was admitted in January 2024 with shortness of breath and was found to be in acute heart failure with atrial fibrillation with RVR. He was found to have an LVEF of 35% with mitral regugitation. He underwent left heart and right heart catheterization in January 2024 which showed occlusion of the LAD, and 50% in-stent restenosis of a diagonal 1 stent. He underwent cardiac MRI and BEKAH. There was evidence of viability in portions of the septum and apex. Mitral regurgitation was quantified as moderate. He was found to have mild to moderate aortic valve regurgitation.     Cardiac Labs/Diagnostics:  Holter (7/23/24):  1-the rhythm was sinus throughout the recording with an average heart rate of 58 bpm, the minimum heart rate was 47 bpm sinus bradycardia at 12:47 AM and the maximal heart rate was 103 bpm sinus tachycardia at 9:16 AM  2-occasional isolated premature ventricular complexes, there was a total number of 313 beats in 24 hours representing 0.4% of total QRS complexes with 1 event of ventricular couplet  3-rare isolated premature atrial complexes, there was 10 beats in 24 hours  4-no pauses exceeding 2 seconds were seen  5- an episode of chest pain reported by the patient correlated with normal sinus rhythm and normal heart rate     cMRI (1/15/24):  1. Mildly dilated left ventricle with moderately reduced systolic function. LVEF = 32%. There is severe   hypokinesis of the mid to basal anterior wall with mild to moderate global hypokinesis. Delayed Gadolinium   enhancement imaging demonstrates non-transmural scar (26-50% wall thickness) in the mid anterior wall .   There is transmural scar (50-75% wall thickness) in the mid anterolateral wall that is non-viable. All   remaining myocardial segments are viable. Overall, findings suggest that majority of the LAD territory is   viable. The segment of the mid anterolateral wall that is non-viable is likely diagonal branch territory.   2. Normal right ventricular  size and systolic function. RVEF = 56%.   3. Moderate mitral regurgitation (regurgitant volume 33 ml, regurgitant fraction 46%).   4. Mild to moderate aortic regurgitation.   5. Mildly dilated aortic root (4.1 cm) and ascending aorta (4.2 cm).   6. Extra-cardiac: Non-cardiac findings visualized on this study will be independently reviewed by a   radiologist. Please see the separate Radiologist report.      Cardiac cath (1/12/24):  LM: Mild disease.   LAD: The LAD is occluded in the proximal segment following a trifurcation with a large septal and large diagonal branch.  The diagonal branch has a patent proximal vessel previously placed stent with up to 50% ISR.  The large septal at this trifurcation also has moderate diffuse disease.  Both the diagonal and septal feed collateralization to the distal LAD.   LCX: Mild disease and up to 40% mid vessel lesion.   RCA: Moderate-sized vessel, mild diffuse disease.  Dominant.   LVEDP: 9 mmHg   Access: Right radial artery, TR compression band.      Carotid ultrasounds (1/12/24):  Right.   * Less than 50% stenosis in the right internal carotid artery.   * Right vertebral artery is patent with antegrade flow.   * No evidence of hemodynamically significant stenosis in the right subclavian, external carotid and common carotid arteries.   * The right vertebral artery flow is dampened.   Left.   * Less than 50% stenosis in the left internal carotid artery.   * Left vertebral artery is patent with antegrade flow.   * No evidence of hemodynamically significant stenosis in the left common carotid, external carotid and subclavian arteries.     Right Heart Catheterization   RA:8 mean mmHg   RV:36/2 mmHg   PA: 40/19 (27) mmHg   PCWP: 17/20 (20) mmHg     Thermal CO/CI: 2.52 L/m / 1.47 L/min/m2   Meryl CO/CI: 3.46 L/m / 2.02 L/min/m2

## 2025-02-26 ENCOUNTER — TELEPHONE (OUTPATIENT)
Dept: CARDIOLOGY | Facility: CLINIC | Age: 75
End: 2025-02-26
Payer: MEDICARE

## 2025-02-26 ENCOUNTER — HOSPITAL ENCOUNTER (OUTPATIENT)
Dept: RADIOLOGY | Facility: HOSPITAL | Age: 75
Discharge: HOME | End: 2025-02-26
Payer: MEDICARE

## 2025-02-26 ENCOUNTER — OFFICE VISIT (OUTPATIENT)
Dept: CARDIAC SURGERY | Facility: HOSPITAL | Age: 75
End: 2025-02-26
Payer: MEDICARE

## 2025-02-26 VITALS
HEIGHT: 69 IN | OXYGEN SATURATION: 97 % | DIASTOLIC BLOOD PRESSURE: 58 MMHG | WEIGHT: 128.2 LBS | HEART RATE: 70 BPM | BODY MASS INDEX: 18.99 KG/M2 | SYSTOLIC BLOOD PRESSURE: 94 MMHG

## 2025-02-26 DIAGNOSIS — I25.10 CORONARY ARTERY DISEASE INVOLVING NATIVE CORONARY ARTERY OF NATIVE HEART, UNSPECIFIED WHETHER ANGINA PRESENT: ICD-10-CM

## 2025-02-26 DIAGNOSIS — Z95.1 S/P CABG X 2: ICD-10-CM

## 2025-02-26 PROCEDURE — 1159F MED LIST DOCD IN RCRD: CPT | Performed by: STUDENT IN AN ORGANIZED HEALTH CARE EDUCATION/TRAINING PROGRAM

## 2025-02-26 PROCEDURE — 99211 OFF/OP EST MAY X REQ PHY/QHP: CPT | Performed by: STUDENT IN AN ORGANIZED HEALTH CARE EDUCATION/TRAINING PROGRAM

## 2025-02-26 PROCEDURE — 1126F AMNT PAIN NOTED NONE PRSNT: CPT | Performed by: STUDENT IN AN ORGANIZED HEALTH CARE EDUCATION/TRAINING PROGRAM

## 2025-02-26 PROCEDURE — 1111F DSCHRG MED/CURRENT MED MERGE: CPT | Performed by: STUDENT IN AN ORGANIZED HEALTH CARE EDUCATION/TRAINING PROGRAM

## 2025-02-26 PROCEDURE — 3008F BODY MASS INDEX DOCD: CPT | Performed by: STUDENT IN AN ORGANIZED HEALTH CARE EDUCATION/TRAINING PROGRAM

## 2025-02-26 PROCEDURE — 71046 X-RAY EXAM CHEST 2 VIEWS: CPT

## 2025-02-26 ASSESSMENT — ENCOUNTER SYMPTOMS
DEPRESSION: 0
OCCASIONAL FEELINGS OF UNSTEADINESS: 1
LOSS OF SENSATION IN FEET: 0

## 2025-02-26 ASSESSMENT — PAIN SCALES - GENERAL: PAINLEVEL_OUTOF10: 0-NO PAIN

## 2025-02-26 ASSESSMENT — PATIENT HEALTH QUESTIONNAIRE - PHQ9
2. FEELING DOWN, DEPRESSED OR HOPELESS: NOT AT ALL
SUM OF ALL RESPONSES TO PHQ9 QUESTIONS 1 AND 2: 1
1. LITTLE INTEREST OR PLEASURE IN DOING THINGS: SEVERAL DAYS

## 2025-02-26 NOTE — TELEPHONE ENCOUNTER
----- Message from Christy Carpio sent at 2/26/2025  3:29 PM EST -----  Regarding: FW: Labs  Patient said [facility] jose labs Friday 2/21/2025 - unable to locate.  Needs to get a CBC so I can see if can go back on Eliquis  ----- Message -----  From: Christy Swartz  Sent: 2/26/2025   7:45 AM EST  To: NICKI Ramsey  Subject: Labs                                             Last labs done at [facility] 12/12/24 and they are now in chart   Thanks  no other labs  ----- Message -----  From: NICKI Ramsey  Sent: 2/25/2025   3:58 PM EST  To: Christy Swartz    Labs should have been drawn last Friday at [facility] ([facility] jose them)  These labs are form hospitalization 2/4  ----- Message -----  From: Christy Swartz  Sent: 2/25/2025   3:51 PM EST  To: NICKI Ramsey

## 2025-02-27 NOTE — TELEPHONE ENCOUNTER
Phoned patient and spoke with him and his wife. Labs were sent to [facility] 2/21/2025 that were drawn at home the [facility]. They sates Dr. Carrasco does not want patient on any anticoagulants due to be so frail. Please advise.    To Christy Marcum NP

## 2025-03-12 ENCOUNTER — TELEPHONE (OUTPATIENT)
Dept: CARDIOLOGY | Facility: CLINIC | Age: 75
End: 2025-03-12
Payer: MEDICARE

## 2025-03-12 DIAGNOSIS — I25.5 ISCHEMIC CARDIOMYOPATHY: ICD-10-CM

## 2025-03-12 DIAGNOSIS — I50.20 ACC/AHA STAGE C SYSTOLIC HEART FAILURE: ICD-10-CM

## 2025-03-12 DIAGNOSIS — I25.10 ASHD (ARTERIOSCLEROTIC HEART DISEASE): ICD-10-CM

## 2025-03-12 NOTE — TELEPHONE ENCOUNTER
Cardiac rehab calling for an order for patient.  Pt is s/p CABG x2    Order prepared for signature.      To fax to cardiac rehab once signed.  [phone]

## 2025-03-13 NOTE — TELEPHONE ENCOUNTER
Order rerouted to Dr. Don Kuhn DO for signature     Task sent to  to call and arrange once order signed.

## 2025-03-19 NOTE — TELEPHONE ENCOUNTER
Family phones stating she spoke with cardiac rehab and they still do not have order for patient. Advised it was faxed this morning but will refax again.

## 2025-03-28 NOTE — PROGRESS NOTES
"Referred by  and Dr. Thornton for New Patient Visit (MIDCAB Consultation)     History Of Present Illness:    Mr. Valerio is a 74M with a PMHx sig for CAD s/p PCI (diag; 2001), stage C systolic HF/ICM/HFrEF with moderate LV dysfunction currently without an ICD, pAF on AAD and DOAC therapies, and remote h/o polio .  He was initially referred for CABG eval outside the  health system.   A cMRI was performed noting that most of the anterior had viability. After consultation with CTS his CABG was deferred in light of weight loss/sarcopenia. He was told to \"bulk up\" and then he would be reconsidered. He has since relocated to [facility] and was referred to /[facility] [place].     He is referred today for evaluation for minimally invasive CABG     Past Medical History:  He has a past medical history of CHF (congestive heart failure) and Coronary artery disease.    Past Surgical History:  He has a past surgical history that includes Testicle surgery; Coronary angioplasty with stent; Dental surgery; and Coronary artery bypass graft (2025).      Social History:  He reports that he quit smoking about 14 months ago. His smoking use included cigarettes. He has never used smokeless tobacco. He reports current alcohol use. He reports that he does not use drugs.    Family History:  Family History   Problem Relation Name Age of Onset    Stroke Mother      Heart attack Father          Allergies:  Farxiga [dapagliflozin]    Outpatient Medications:  Current Outpatient Medications   Medication Instructions    acetaminophen (TYLENOL) 650 mg, oral, Every 6 hours PRN    amiodarone (PACERONE) 200 mg, oral, Daily    ARIPiprazole (ABILIFY) 10 mg, Daily    aspirin 81 mg, Daily    atorvastatin (LIPITOR) 80 mg, Daily    cholecalciferol (VITAMIN D-3) 2,000 Units, Daily    colchicine 0.3 mg, oral, Daily    dabigatran etexilate (PRADAXA) 150 mg, oral, 2 times daily, Do not crush or chew.    diphenhydrAMINE (BENADRYL) 25 mg, oral, " "Nightly PRN    docusate sodium (COLACE) 100 mg, 2 times daily PRN    furosemide (LASIX) 20 mg, oral, Daily PRN    lamoTRIgine (LAMICTAL) 150 mg, Nightly    lamoTRIgine (LAMICTAL) 200 mg, Daily    metoprolol tartrate (LOPRESSOR) 25 mg, oral, 2 times daily    mirtazapine (REMERON) 15 mg, Nightly    multivitamin with minerals tablet 1 tablet, Daily    pantoprazole (ProtoNix) 40 mg EC tablet 1 tablet, Daily (0630)    sacubitriL-valsartan (Entresto) 24-26 mg tablet 0.5 tablets, oral, 2 times daily    VITAMIN K2 ORAL 1 tablet, Daily        Last Recorded Vitals:  Vitals:    10/30/24 1423   BP: 112/69   BP Location: Left arm   Patient Position: Sitting   Pulse: 65   SpO2: 97%   Weight: 60 kg (132 lb 3.2 oz)   Height: 1.753 m (5' 9\")       Physical Exam:  General: no acute distress, very thin appearing  Cardiovascular: Regular rate and rhythm  Respiratory: symmetrical chest rise and fall, no increased work of breathing  Extremities: no edema        Last Labs:  CBC -  Lab Results   Component Value Date    WBC 8.6 02/04/2025    HGB 11.9 (L) 02/04/2025    HCT 36.2 (L) 02/04/2025    MCV 96 02/04/2025     02/04/2025       CMP -  Lab Results   Component Value Date    CALCIUM 10.0 02/04/2025    PHOS 2.5 02/04/2025    PROT 5.2 (L) 01/21/2025    ALBUMIN 4.4 02/04/2025    AST 29 01/21/2025    ALT 29 01/21/2025    ALKPHOS 45 01/21/2025    BILITOT 1.0 01/21/2025       LIPID PANEL -   No results found for: \"CHOL\", \"TRIG\", \"HDL\", \"CHHDL\", \"LDLF\", \"VLDL\", \"NHDL\"    RENAL FUNCTION PANEL -   Lab Results   Component Value Date    GLUCOSE 85 02/04/2025     02/04/2025    K 3.6 02/04/2025    CL 97 (L) 02/04/2025    CO2 28 02/04/2025    ANIONGAP 16 02/04/2025    BUN 39 (H) 02/04/2025    CREATININE 0.94 02/04/2025    CALCIUM 10.0 02/04/2025    PHOS 2.5 02/04/2025    ALBUMIN 4.4 02/04/2025        Lab Results   Component Value Date    HGBA1C 5.6 01/15/2024     Cardiac Labs/Diagnostics:  Holter (7/23/24):  1-the rhythm was sinus " throughout the recording with an average heart rate of 58 bpm, the minimum heart rate was 47 bpm sinus bradycardia at 12:47 AM and the maximal heart rate was 103 bpm sinus tachycardia at 9:16 AM  2-occasional isolated premature ventricular complexes, there was a total number of 313 beats in 24 hours representing 0.4% of total QRS complexes with 1 event of ventricular couplet  3-rare isolated premature atrial complexes, there was 10 beats in 24 hours  4-no pauses exceeding 2 seconds were seen  5- an episode of chest pain reported by the patient correlated with normal sinus rhythm and normal heart rate     cMRI (1/15/24):  1. Mildly dilated left ventricle with moderately reduced systolic function. LVEF = 32%. There is severe   hypokinesis of the mid to basal anterior wall with mild to moderate global hypokinesis. Delayed Gadolinium   enhancement imaging demonstrates non-transmural scar (26-50% wall thickness) in the mid anterior wall .   There is transmural scar (50-75% wall thickness) in the mid anterolateral wall that is non-viable. All   remaining myocardial segments are viable. Overall, findings suggest that majority of the LAD territory is   viable. The segment of the mid anterolateral wall that is non-viable is likely diagonal branch territory.   2. Normal right ventricular size and systolic function. RVEF = 56%.   3. Moderate mitral regurgitation (regurgitant volume 33 ml, regurgitant fraction 46%).   4. Mild to moderate aortic regurgitation.   5. Mildly dilated aortic root (4.1 cm) and ascending aorta (4.2 cm).   6. Extra-cardiac: Non-cardiac findings visualized on this study will be independently reviewed by a   radiologist. Please see the separate Radiologist report.      Cardiac cath (1/12/24):  LM: Mild disease.   LAD: The LAD is occluded in the proximal segment following a trifurcation with a large septal and large diagonal branch.  The diagonal branch has a patent proximal vessel previously placed stent  with up to 50% ISR.  The large septal at this trifurcation also has moderate diffuse disease.  Both the diagonal and septal feed collateralization to the distal LAD.   LCX: Mild disease and up to 40% mid vessel lesion.   RCA: Moderate-sized vessel, mild diffuse disease.  Dominant.   LVEDP: 9 mmHg   Access: Right radial artery, TR compression band.      Carotid ultrasounds (1/12/24):  Right.   * Less than 50% stenosis in the right internal carotid artery.   * Right vertebral artery is patent with antegrade flow.   * No evidence of hemodynamically significant stenosis in the right subclavian, external carotid and common carotid arteries.   * The right vertebral artery flow is dampened.   Left.   * Less than 50% stenosis in the left internal carotid artery.   * Left vertebral artery is patent with antegrade flow.   * No evidence of hemodynamically significant stenosis in the left common carotid, external carotid and subclavian arteries.     Right Heart Catheterization   RA:8 mean mmHg   RV:36/2 mmHg   PA: 40/19 (27) mmHg   PCWP: 17/20 (20) mmHg     Thermal CO/CI: 2.52 L/m / 1.47 L/min/m2   Meryl CO/CI: 3.46 L/m / 2.02 L/min/m2        Assessment/Plan     Patient will very likely benefit from JON to LAD, he is a reasonable candidate for minimally invasive cabg from an anatomic standpoit; however, he is still very think and a bit deconditioned.  He has been gaining wait and working hard towards his rehabilitation.    If he can continue to gain wait and improve his functional status, we will schedule him for surgery.  Will have him follow up again in 6 weeks to re-evaluate .            Sofia Carrasco MD

## 2025-04-10 ENCOUNTER — TELEPHONE (OUTPATIENT)
Dept: CARDIOLOGY | Facility: CLINIC | Age: 75
End: 2025-04-10
Payer: MEDICARE

## 2025-04-10 NOTE — TELEPHONE ENCOUNTER
Voicemail from significant other requesting refill of Metoprolol 25mg, 0.5 tablet BID.  Our records indicate patient should be taking a full 25mg tablet BID.     Called patient for clarification, no answer. Left message for return call.      To clinical for follow up. Rx goes to [facility]  once clarified.

## 2025-04-11 NOTE — TELEPHONE ENCOUNTER
Family phones in and states patient is 12.5 mg metoprolol bid. States when he had his heart surgery in January they adjusted his dose for him.    Okay to send script for metoprolol tartrate 12.5 mg bid?

## 2025-08-27 ENCOUNTER — APPOINTMENT (OUTPATIENT)
Dept: CARDIOLOGY | Facility: CLINIC | Age: 75
End: 2025-08-27
Payer: MEDICARE

## 2025-08-27 VITALS
BODY MASS INDEX: 19.85 KG/M2 | WEIGHT: 134 LBS | HEART RATE: 50 BPM | DIASTOLIC BLOOD PRESSURE: 82 MMHG | SYSTOLIC BLOOD PRESSURE: 120 MMHG | HEIGHT: 69 IN

## 2025-08-27 DIAGNOSIS — N52.2 DRUG-INDUCED ERECTILE DYSFUNCTION: ICD-10-CM

## 2025-08-27 DIAGNOSIS — I50.9 ACC/AHA STAGE C CONGESTIVE HEART FAILURE DUE TO ISCHEMIC CARDIOMYOPATHY: ICD-10-CM

## 2025-08-27 DIAGNOSIS — I50.20 ACC/AHA STAGE C SYSTOLIC HEART FAILURE: ICD-10-CM

## 2025-08-27 DIAGNOSIS — Z87.891 FORMER SMOKER: ICD-10-CM

## 2025-08-27 DIAGNOSIS — I25.5 ISCHEMIC CARDIOMYOPATHY: ICD-10-CM

## 2025-08-27 DIAGNOSIS — Z79.899 HIGH RISK MEDICATION USE: ICD-10-CM

## 2025-08-27 DIAGNOSIS — E78.2 MIXED HYPERLIPIDEMIA: ICD-10-CM

## 2025-08-27 DIAGNOSIS — I25.10 ASHD (ARTERIOSCLEROTIC HEART DISEASE): ICD-10-CM

## 2025-08-27 DIAGNOSIS — Z98.61 CORONARY ARTERIOSCLEROSIS AFTER PERCUTANEOUS TRANSLUMINAL CORONARY ANGIOPLASTY (PTCA): ICD-10-CM

## 2025-08-27 DIAGNOSIS — I48.19 PERSISTENT ATRIAL FIBRILLATION (MULTI): ICD-10-CM

## 2025-08-27 DIAGNOSIS — I25.5 ACC/AHA STAGE C CONGESTIVE HEART FAILURE DUE TO ISCHEMIC CARDIOMYOPATHY: ICD-10-CM

## 2025-08-27 DIAGNOSIS — I25.10 CORONARY ARTERIOSCLEROSIS AFTER PERCUTANEOUS TRANSLUMINAL CORONARY ANGIOPLASTY (PTCA): ICD-10-CM

## 2025-08-27 PROCEDURE — 99215 OFFICE O/P EST HI 40 MIN: CPT | Performed by: INTERNAL MEDICINE

## 2025-08-27 PROCEDURE — 3008F BODY MASS INDEX DOCD: CPT | Performed by: INTERNAL MEDICINE

## 2025-08-27 PROCEDURE — 1160F RVW MEDS BY RX/DR IN RCRD: CPT | Performed by: INTERNAL MEDICINE

## 2025-08-27 PROCEDURE — 1159F MED LIST DOCD IN RCRD: CPT | Performed by: INTERNAL MEDICINE

## 2025-08-27 PROCEDURE — 1036F TOBACCO NON-USER: CPT | Performed by: INTERNAL MEDICINE

## 2025-08-27 PROCEDURE — 93000 ELECTROCARDIOGRAM COMPLETE: CPT | Performed by: INTERNAL MEDICINE

## 2025-08-27 RX ORDER — SILDENAFIL 25 MG/1
25 TABLET, FILM COATED ORAL DAILY PRN
Qty: 30 TABLET | Refills: 2 | Status: SHIPPED | OUTPATIENT
Start: 2025-08-27 | End: 2025-09-26

## 2025-09-03 LAB
NON-UH HIE ALANINE AMINOTRANSFERASE: 22 U/L (ref 7–52)
NON-UH HIE ANION GAP: 8.1 (ref 6–15)
NON-UH HIE ASPARTATE AMINO TRANSFERASE: 22 U/L (ref 13–39)
NON-UH HIE B-TYPE NATRIURETIC PEPTIDE: 155 PG/ML (ref 5–100)
NON-UH HIE BASOPHILS # (AUTO): 0 10*3/UL (ref 0–0.2)
NON-UH HIE BASOPHILS % (AUTO): 0.5 %
NON-UH HIE BLOOD UREA NITROGEN: 18 MG/DL (ref 7–25)
NON-UH HIE CALCIUM: 9.1 MG/DL (ref 8.6–10.3)
NON-UH HIE CARBON DIOXIDE: 29.7 MMOL/L (ref 21–31)
NON-UH HIE CHLORIDE: 106 MMOL/L (ref 98–107)
NON-UH HIE CHOL/HDL RATIO: 2.1
NON-UH HIE CHOLESTEROL: 88 MG/DL (ref 140–200)
NON-UH HIE CREATININE: 0.99 MG/DL (ref 0.7–1.3)
NON-UH HIE EOSINOPHILS # (AUTO): 0.2 10*3/UL (ref 0–0.45)
NON-UH HIE EOSINOPHILS % (AUTO): 1.9 %
NON-UH HIE ESTIMATED GFR: >60
NON-UH HIE GLUCOSE: 111 MG/DL (ref 70–100)
NON-UH HIE HDL CHOLESTEROL: 42 MG/DL (ref 23–92)
NON-UH HIE HEMATOCRIT: 38.2 % (ref 38.8–50)
NON-UH HIE HEMOGLOBIN: 13 G/DL (ref 13–17)
NON-UH HIE LDL CHOLESTEROL,CALCULATED: 33 MG/DL (ref 0–100)
NON-UH HIE LYMPHOCYTES # (AUTO): 1.4 10*3/UL (ref 1–4.8)
NON-UH HIE LYMPHOCYTES % (AUTO): 15.5 %
NON-UH HIE MEAN CORPUSCULAR HEMOGLOBIN: 32.5 PG (ref 27.5–35.2)
NON-UH HIE MEAN CORPUSCULAR HGB CONC: 34.1 G/DL (ref 32.5–35.6)
NON-UH HIE MEAN CORPUSCULAR VOLUME: 95.2 FL (ref 83.5–101)
NON-UH HIE MEAN PLATELET VOLUME: 10.1 FL (ref 6.6–10.1)
NON-UH HIE MONOCYTES # (AUTO): 0.8 10*3/UL (ref 0–0.8)
NON-UH HIE MONOCYTES % (AUTO): 8.6 %
NON-UH HIE NEUTROPHILS # (AUTO): 6.7 10*3/UL (ref 1.8–7.7)
NON-UH HIE NEUTROPHILS % (AUTO): 73.5 %
NON-UH HIE NRBC%: 0.2 /100{WBC} (ref 0–0.5)
NON-UH HIE PLATELET COUNT: 167 10*3/UL (ref 150–450)
NON-UH HIE POTASSIUM: 3.8 MMOL/L (ref 3.5–5.1)
NON-UH HIE RED BLOOD COUNT: 4.01 10*6/UL (ref 3.9–5.6)
NON-UH HIE RED CELL DISTRIBUTION WIDTH: 14.4 % (ref 12–14.8)
NON-UH HIE SODIUM: 140 MMOL/L (ref 136–145)
NON-UH HIE THYROID STIMULATING HORMONE: 1.16 U[IU]/ML (ref 0.45–5.33)
NON-UH HIE TRIGLYCERIDE W/REFLEX: 63 MG/DL (ref 0–149)
NON-UH HIE UNCORRECTED WBC: 9.1 10*3/UL (ref 4.1–10.5)
NON-UH HIE VLDL CHOLESTEROL: 12 MG/DL
NON-UH HIE WHITE BLOOD COUNT: 9.1 [CFU]/ML (ref 4.1–10.5)

## 2026-02-05 ENCOUNTER — APPOINTMENT (OUTPATIENT)
Dept: CARDIOLOGY | Facility: CLINIC | Age: 76
End: 2026-02-05
Payer: MEDICARE

## (undated) DEVICE — SUTURE, PROLENE, 7-0, 30 IN, BV1, DA, BLUE

## (undated) DEVICE — FORCEPS, CADIERE, DAVINCI XI

## (undated) DEVICE — SUTURE, ETHIBOND, XTRA, 30 IN, 0, CT-1, GREEN

## (undated) DEVICE — TUBING SET, BIFURCATED, SMOKE EVAC, FILTERED, F/AIRSEAL

## (undated) DEVICE — CLEANER, ELECTROSURGICAL, TIP, 5 X 5 CM, LF

## (undated) DEVICE — DRESSING, ADHESIVE, ISLAND, TELFA, 2 X 3.75 IN, LF

## (undated) DEVICE — DRAPE, ARM XI

## (undated) DEVICE — GOWN, ASTOUND, XL

## (undated) DEVICE — ELECTRODE, QUICK-COMBO, EDGE SYSTEM, REDI PACK

## (undated) DEVICE — CATHETER, DRAINAGE, NASOGASTRIC, DOUBLE LUMEN, FUNNEL END, SUMP, SALEM, 18 FR, 48 IN, PVC, STERILE

## (undated) DEVICE — CLIP, LIGATING, W/ADHESIVE PAD, MEDIUM, TITANIUM

## (undated) DEVICE — SUTURE, PROLENE, 6-0, 30 IN, RB-2, BLUE

## (undated) DEVICE — PACING CABLE, EXTENSION, 12 FT BEIGE, DISPOSABLE

## (undated) DEVICE — CLIP, SPRING, BULLDOG, FOGARTY, SOFT JAW, 6 MM, LF

## (undated) DEVICE — DRESSING, MEPILEX, BORDER, HEEL, 8.7 X 9.1 IN

## (undated) DEVICE — KIT, CELL SAVER, W/COLLECTION SET, 225ML WASH SET

## (undated) DEVICE — SUTURE, MONOCRYL, 3-0, 18 IN, PS2, UNDYED

## (undated) DEVICE — COVER, MAYO STAND, W/PAD, 23 IN, DISPOSABLE, PLASTIC, LF, STERILE

## (undated) DEVICE — CAUTERY SPATULA, PERMANENT

## (undated) DEVICE — SYSTEM, OCTOPUS NUVO TISSUE STABILIZER

## (undated) DEVICE — SURGISLEEVE, WOUND PROTECTOR, SMALL 2.5-6CM

## (undated) DEVICE — GRASPER, MICRO, BIPOLAR, DAVINCI XI

## (undated) DEVICE — BAG, DECANTER

## (undated) DEVICE — DRAPE, FLUID WARMER

## (undated) DEVICE — DRESSING, MEPILEX, BORDER, SACRUM, 8.7 X 9.8 IN

## (undated) DEVICE — DRAPE, INCISE, ANTIMICROBIAL, IOBAN 2, LARGE, 17 X 23 IN, DISPOSABLE, STERILE

## (undated) DEVICE — Device

## (undated) DEVICE — CLIPPER, SURGICAL BLADE ASSEMBLY, GENERAL PURPOSE, SINGLE USE

## (undated) DEVICE — CONNECTOR, STRAIGHT, 0.5 X 0.5 IN

## (undated) DEVICE — APPLICATOR, CHLORAPREP, W/ORANGE TINT, 26ML

## (undated) DEVICE — APPLIER, CLIP MED-LG XI

## (undated) DEVICE — SHUNT, INTRACORONARY, 1.5 MM, CLEARVIEW

## (undated) DEVICE — MANIFOLD, 4 PORT NEPTUNE STANDARD

## (undated) DEVICE — DRAPE, SHEET, UTILITY, NON ABSORBENT, 18 X 26 IN, LF

## (undated) DEVICE — DRESSING, ISLAND, TELFA, 4 X 5 IN

## (undated) DEVICE — LUBRICANT, ELECTROLUBE, F/ELECTRODE TIPS

## (undated) DEVICE — KIT, BLOWER / MISTER II

## (undated) DEVICE — APPLIER, CLIP, SMALL XI

## (undated) DEVICE — COVER, CART, 45 X 27 X 48 IN, CLEAR

## (undated) DEVICE — MICROCOAGULATION TEST, ACT+ TEST CUVETTE

## (undated) DEVICE — COVER, TABLE, UHC

## (undated) DEVICE — FILTER, IV, BLOOD, MICROAGGREGATE, 40 MIC, RBC TRANSFUSION

## (undated) DEVICE — GEL, ULTRASOUND, AQUASONIC 100, 20 GM, STERILE

## (undated) DEVICE — SYRINGE, LUER LOCK, 12ML

## (undated) DEVICE — TRAY, SURESTEP, URINE METER, 14FR, SILICONE

## (undated) DEVICE — ELECTRODE, ELECTROSURGICAL, BLADE EXT 4 INCH, INSULATED

## (undated) DEVICE — TIP, SUCTION, YANKAUER, FLEXIBLE

## (undated) DEVICE — COVER, TABLE, 44 X 75 IN, DISPOSABLE, LF, STERILE

## (undated) DEVICE — DRAIN, CHANNEL, BLAKE, HUBLESS, ROUND, 28FR

## (undated) DEVICE — LEAD, PACING, MYOCARDIAL, BIPOLAR, TEMPORARY

## (undated) DEVICE — PACING CABLE, EXTENSION, 12 FT BLUE, DISPOSABLE

## (undated) DEVICE — CARE KIT, LAPAROSCOPIC, ADVANCED

## (undated) DEVICE — SYRINGE, 20 CC, LUER LOCK, MONOJECT, W/O CAP, LF

## (undated) DEVICE — COVER, TIP HOT SHEARS ENDOWRIST

## (undated) DEVICE — DRAPE PACK, UNIVERSAL II

## (undated) DEVICE — ARM PROTECTORS, FOAM

## (undated) DEVICE — SUTURE, VICRYL, 2-0, 27 IN, CT-1, VIOLET

## (undated) DEVICE — ELECTRODE, ELECTROSURGICAL, BLADE, INSULATED, ENT/IMA, STERILE

## (undated) DEVICE — MARKER, SKIN, RULER AND LABEL PACK, CUSTOM

## (undated) DEVICE — ROBOT ARM, POTS SCISSOR

## (undated) DEVICE — KIT, COLLECTION, CARDIO

## (undated) DEVICE — COLLECTION UNIT, DRAINAGE, THORACIC, SINGLE TUBE, DRY SUCTION, ATS COMPATIBLE, OASIS 3600, LF

## (undated) DEVICE — SCISSORS, MONOPOLAR, CURVED, 8MM

## (undated) DEVICE — DRAPE, COLUMN, DAVINCI XI

## (undated) DEVICE — TUBE SET, PNEUMOCLEAR, SMOKE EVACU, HIGH-FLOW

## (undated) DEVICE — SHUNT, INTRACORONARY, 2.0 MM, CLEARVIEW

## (undated) DEVICE — CONNECTOR, STRAIGHT, 0.375 X 0.375 IN

## (undated) DEVICE — CLIP, LIGATING, W/ADHESIVE, WIDE SLOT, SMALL, TITANIUM

## (undated) DEVICE — MARKER, SKIN, DUAL TIP INK W/9 LABEL AND REMOVABLE TIME OUT SLEEVE

## (undated) DEVICE — KIT, FAST START

## (undated) DEVICE — SEAL, UNIVERSAL, 5-12MM